# Patient Record
Sex: FEMALE | Race: WHITE | NOT HISPANIC OR LATINO | Employment: FULL TIME | URBAN - METROPOLITAN AREA
[De-identification: names, ages, dates, MRNs, and addresses within clinical notes are randomized per-mention and may not be internally consistent; named-entity substitution may affect disease eponyms.]

---

## 2017-01-07 ENCOUNTER — MYC MEDICAL ADVICE (OUTPATIENT)
Dept: PEDIATRICS | Facility: CLINIC | Age: 52
End: 2017-01-07

## 2017-01-07 DIAGNOSIS — I10 BENIGN HYPERTENSION: Primary | ICD-10-CM

## 2017-01-09 RX ORDER — HYDROCHLOROTHIAZIDE 25 MG/1
12.5 TABLET ORAL DAILY
Qty: 15 TABLET | Refills: 0 | Status: SHIPPED | OUTPATIENT
Start: 2017-01-09 | End: 2017-04-13

## 2017-01-09 NOTE — TELEPHONE ENCOUNTER
HCTZ 25 mg      Last Written Prescription Date: 06/17/15  Last Fill Quantity: 45, # refills: 3  Last Office Visit with [facility], [facility] or [facility] prescribing provider: 11/09/16  Per office appointment note of 11/09/16-will add hctz to lisinopril, recheck in 1 month.      POTASSIUM   Date Value Ref Range Status   10/26/2016 4.4 3.4 - 5.3 mmol/L Final     CREATININE   Date Value Ref Range Status   10/26/2016 0.72 0.52 - 1.04 mg/dL Final     BP Readings from Last 3 Encounters:   12/02/16 110/80   11/16/16 118/79   11/07/16 156/94   Prescription approved per [facility] Refill Protocol for 30 day supply in order to use up remaining Lisinopril 10 mg-prior to beginning combination Lisinopril/HCTZ.  SCAR Bruce RN

## 2017-04-12 DIAGNOSIS — I10 BENIGN HYPERTENSION: ICD-10-CM

## 2017-04-13 RX ORDER — LISINOPRIL/HYDROCHLOROTHIAZIDE 10-12.5 MG
TABLET ORAL
Qty: 30 TABLET | Refills: 1 | Status: SHIPPED | OUTPATIENT
Start: 2017-04-13 | End: 2017-06-16

## 2017-04-13 NOTE — TELEPHONE ENCOUNTER
LISINOPRIL-HCTZ 10/12.5MG      Last Written Prescription Date: 11/9/2016  Last Fill Quantity: 30, # refills: 1  Last Office Visit with [facility], P or [facility] prescribing provider: 11/9/2016       Potassium   Date Value Ref Range Status   10/26/2016 4.4 3.4 - 5.3 mmol/L Final     Creatinine   Date Value Ref Range Status   10/26/2016 0.72 0.52 - 1.04 mg/dL Final     BP Readings from Last 3 Encounters:   12/02/16 110/80   11/16/16 118/79   11/07/16 (!) 156/94

## 2017-04-13 NOTE — TELEPHONE ENCOUNTER
Routing refill request to provider for review/approval because:  Please clarify, has the plain HCTZ been discontinued or is she taking both?  Should it be removed from medication list?  Please sign if ok.   Susana Costello RN  Triage Nurse

## 2017-06-16 DIAGNOSIS — I10 BENIGN HYPERTENSION: ICD-10-CM

## 2017-06-16 NOTE — TELEPHONE ENCOUNTER
lisinopril-hydrochlorothiazide (PRINZIDE/ZESTORETIC) 10-12.5 MG      Last Written Prescription Date: 4/13/17  Last Fill Quantity: 30, # refills: 1  Last Office Visit with [facility], [facility] or [facility] prescribing provider: 11/9/16       Potassium   Date Value Ref Range Status   10/26/2016 4.4 3.4 - 5.3 mmol/L Final     Creatinine   Date Value Ref Range Status   10/26/2016 0.72 0.52 - 1.04 mg/dL Final     BP Readings from Last 3 Encounters:   12/02/16 110/80   11/16/16 118/79   11/07/16 (!) 156/94

## 2017-06-20 RX ORDER — LISINOPRIL/HYDROCHLOROTHIAZIDE 10-12.5 MG
TABLET ORAL
Qty: 30 TABLET | Refills: 4 | Status: SHIPPED | OUTPATIENT
Start: 2017-06-20 | End: 2017-08-06

## 2017-06-20 NOTE — TELEPHONE ENCOUNTER
Prescription approved per [facility] Refill Protocol.    Josy Duran RN

## 2017-06-24 ENCOUNTER — HEALTH MAINTENANCE LETTER (OUTPATIENT)
Age: 52
End: 2017-06-24

## 2017-07-20 ENCOUNTER — HOSPITAL ENCOUNTER (OUTPATIENT)
Dept: MAMMOGRAPHY | Facility: CLINIC | Age: 52
Discharge: HOME | End: 2017-07-20
Attending: INTERNAL MEDICINE | Admitting: INTERNAL MEDICINE
Payer: COMMERCIAL

## 2017-07-20 DIAGNOSIS — Z12.31 ENCOUNTER FOR SCREENING MAMMOGRAM FOR HIGH-RISK PATIENT: ICD-10-CM

## 2017-07-20 PROCEDURE — 77063 BREAST TOMOSYNTHESIS BI: CPT

## 2017-08-01 ENCOUNTER — OFFICE VISIT (OUTPATIENT)
Dept: PEDIATRICS | Facility: CLINIC | Age: 52
End: 2017-08-01
Payer: COMMERCIAL

## 2017-08-01 VITALS
HEIGHT: 69 IN | SYSTOLIC BLOOD PRESSURE: 126 MMHG | TEMPERATURE: 98.4 F | DIASTOLIC BLOOD PRESSURE: 78 MMHG | BODY MASS INDEX: 26.14 KG/M2 | OXYGEN SATURATION: 99 % | HEART RATE: 60 BPM | WEIGHT: 176.5 LBS

## 2017-08-01 DIAGNOSIS — Z00.00 ROUTINE GENERAL MEDICAL EXAMINATION AT A HEALTH CARE FACILITY: Primary | ICD-10-CM

## 2017-08-01 DIAGNOSIS — E87.1 HYPONATREMIA: ICD-10-CM

## 2017-08-01 DIAGNOSIS — F41.1 GENERALIZED ANXIETY DISORDER: ICD-10-CM

## 2017-08-01 DIAGNOSIS — I10 BENIGN HYPERTENSION: ICD-10-CM

## 2017-08-01 DIAGNOSIS — E55.9 VITAMIN D DEFICIENCY: ICD-10-CM

## 2017-08-01 DIAGNOSIS — D50.9 IRON DEFICIENCY ANEMIA, UNSPECIFIED IRON DEFICIENCY ANEMIA TYPE: ICD-10-CM

## 2017-08-01 DIAGNOSIS — R80.9 MICROALBUMINURIA: ICD-10-CM

## 2017-08-01 LAB — HGB BLD-MCNC: 12.3 G/DL (ref 11.7–15.7)

## 2017-08-01 PROCEDURE — 85018 HEMOGLOBIN: CPT | Performed by: INTERNAL MEDICINE

## 2017-08-01 PROCEDURE — 82306 VITAMIN D 25 HYDROXY: CPT | Performed by: INTERNAL MEDICINE

## 2017-08-01 PROCEDURE — 80048 BASIC METABOLIC PNL TOTAL CA: CPT | Performed by: INTERNAL MEDICINE

## 2017-08-01 PROCEDURE — 99212 OFFICE O/P EST SF 10 MIN: CPT | Mod: 25 | Performed by: INTERNAL MEDICINE

## 2017-08-01 PROCEDURE — 82043 UR ALBUMIN QUANTITATIVE: CPT | Performed by: INTERNAL MEDICINE

## 2017-08-01 PROCEDURE — 36415 COLL VENOUS BLD VENIPUNCTURE: CPT | Performed by: INTERNAL MEDICINE

## 2017-08-01 PROCEDURE — 99396 PREV VISIT EST AGE 40-64: CPT | Performed by: INTERNAL MEDICINE

## 2017-08-01 RX ORDER — CITALOPRAM HYDROBROMIDE 10 MG/1
10 TABLET ORAL DAILY
Qty: 90 TABLET | Refills: 3 | Status: SHIPPED | OUTPATIENT
Start: 2017-08-01 | End: 2018-09-24

## 2017-08-01 RX ORDER — LISINOPRIL/HYDROCHLOROTHIAZIDE 10-12.5 MG
1 TABLET ORAL DAILY
Qty: 30 TABLET | Refills: 4 | Status: CANCELLED | OUTPATIENT
Start: 2017-08-01

## 2017-08-01 ASSESSMENT — ANXIETY QUESTIONNAIRES
IF YOU CHECKED OFF ANY PROBLEMS ON THIS QUESTIONNAIRE, HOW DIFFICULT HAVE THESE PROBLEMS MADE IT FOR YOU TO DO YOUR WORK, TAKE CARE OF THINGS AT HOME, OR GET ALONG WITH OTHER PEOPLE: NOT DIFFICULT AT ALL
1. FEELING NERVOUS, ANXIOUS, OR ON EDGE: NOT AT ALL
6. BECOMING EASILY ANNOYED OR IRRITABLE: NOT AT ALL
3. WORRYING TOO MUCH ABOUT DIFFERENT THINGS: NOT AT ALL
2. NOT BEING ABLE TO STOP OR CONTROL WORRYING: NOT AT ALL
7. FEELING AFRAID AS IF SOMETHING AWFUL MIGHT HAPPEN: NOT AT ALL
GAD7 TOTAL SCORE: 0
5. BEING SO RESTLESS THAT IT IS HARD TO SIT STILL: NOT AT ALL

## 2017-08-01 ASSESSMENT — PATIENT HEALTH QUESTIONNAIRE - PHQ9: 5. POOR APPETITE OR OVEREATING: NOT AT ALL

## 2017-08-01 NOTE — MR AVS SNAPSHOT
After Visit Summary   8/1/2017    Josy John    MRN: [id number]           Patient Information     Date Of Birth          1965        Visit Information        Provider Department      8/1/2017 2:40 PM Samantha Mcdonald MD [facility]        Today's Diagnoses     Routine general medical examination at a health care facility    -  1    Benign hypertension        Microalbuminuria        Generalized anxiety disorder        Iron deficiency anemia, unspecified iron deficiency anemia type        Vitamin D deficiency          Care Instructions      Preventive Health Recommendations  Female Ages 50 - 64    Yearly exam: See your health care provider every year in order to  o Review health changes.   o Discuss preventive care.    o Review your medicines if your doctor has prescribed any.      Get a Pap test every three years (unless you have an abnormal result and your provider advises testing more often).    If you get Pap tests with HPV test, you only need to test every 5 years, unless you have an abnormal result.     You do not need a Pap test if your uterus was removed (hysterectomy) and you have not had cancer.    You should be tested each year for STDs (sexually transmitted diseases) if you're at risk.     Have a mammogram every 1 to 2 years.    Have a colonoscopy at age 50, or have a yearly FIT test (stool test). These exams screen for colon cancer.      Have a cholesterol test every 5 years, or more often if advised.    Have a diabetes test (fasting glucose) every three years. If you are at risk for diabetes, you should have this test more often.     If you are at risk for osteoporosis (brittle bone disease), think about having a bone density scan (DEXA).    Shots: Get a flu shot each year. Get a tetanus shot every 10 years.    Nutrition:     Eat at least 5 servings of fruits and vegetables each day.    Eat whole-grain bread, whole-wheat pasta and brown rice instead of white  "grains and rice.    Talk to your provider about Calcium and Vitamin D.     Lifestyle    Exercise at least 150 minutes a week (30 minutes a day, 5 days a week). This will help you control your weight and prevent disease.    Limit alcohol to one drink per day.    No smoking.     Wear sunscreen to prevent skin cancer.     See your dentist every six months for an exam and cleaning.    See your eye doctor every 1 to 2 years.            Follow-ups after your visit        Who to contact     If you have questions or need follow up information about today's clinic visit or your schedule please contact [facility] [place] directly at [phone].  Normal or non-critical lab and imaging results will be communicated to you by SpaceFacehart, letter or phone within 4 business days after the clinic has received the results. If you do not hear from us within 7 days, please contact the clinic through MiracleCordt or phone. If you have a critical or abnormal lab result, we will notify you by phone as soon as possible.  Submit refill requests through Forensic Logic or call your pharmacy and they will forward the refill request to us. Please allow 3 business days for your refill to be completed.          Additional Information About Your Visit        SpaceFaceharCrowdfunder Information     [employer] gives you secure access to your electronic health record. If you see a primary care provider, you can also send messages to your care team and make appointments. If you have questions, please call your primary care clinic.  If you do not have a primary care provider, please call [phone] and they will assist you.        Care EveryWhere ID     This is your Care EveryWhere ID. This could be used by other organizations to access your [facility] medical records  [id number]        Your Vitals Were     Pulse Temperature Height Pulse Oximetry BMI (Body Mass Index)       60 98.4  F (36.9  C) (Tympanic) 5' 8.75\" (1.746 m) 99% 26.25 kg/m2        Blood Pressure from Last 3 " Encounters:   08/01/17 126/78   12/02/16 110/80   11/16/16 118/79    Weight from Last 3 Encounters:   08/01/17 176 lb 8 oz (80.1 kg)   11/07/16 178 lb 8 oz (81 kg)   09/28/16 177 lb 2 oz (80.3 kg)              We Performed the Following     Albumin Random Urine Quantitative     Basic metabolic panel     HEMOGLOBIN     Vitamin D Deficiency          Where to get your medicines      These medications were sent to [facility] [address]   [address], [place] [address]     Phone:  [phone]     citalopram 10 MG tablet          Primary Care Provider Office Phone # Fax #    Pamela Clark [phone]       [facility] [address] [place] [address]        Equal Access to Services     [facility]: Hadii aad ku hadasho Soomaali, waaxda luqadaha, qaybta kaalmada adeegyada, pipo rameshin haydinorah levy . So [facility] [phone].    ATENCIÓN: Si habla español, tiene a cid disposición servicios gratuitos de asistencia lingüística. Llame al [phone].    We comply with applicable federal civil rights laws and [place] laws. We do not discriminate on the basis of race, color, national origin, age, disability sex, sexual orientation or gender identity.            Thank you!     Thank you for choosing [facility]  for your care. Our goal is always to provide you with excellent care. Hearing back from our patients is one way we can continue to improve our services. Please take a few minutes to complete the written survey that you may receive in the mail after your visit with us. Thank you!             Your Updated Medication List - Protect others around you: Learn how to safely use, store and throw away your medicines at www.disposemymeds.org.          This list is accurate as of: 8/1/17  3:23 PM.  Always use your most recent med list.                   Brand Name Dispense Instructions for use  Diagnosis    aspirin 81 MG tablet      Take by mouth daily        citalopram 10 MG tablet    celeXA    90 tablet    Take 1 tablet (10 mg) by mouth daily    Generalized anxiety disorder       lisinopril-hydrochlorothiazide 10-12.5 MG per tablet    PRINZIDE/ZESTORETIC    30 tablet    TAKE ONE TABLET BY MOUTH EVERY DAY    Benign hypertension       omega 3 1000 MG Caps     90 capsule    Take 1 g by mouth daily        VITAMIN D3 PO      Take 1,000 Units by mouth daily

## 2017-08-01 NOTE — NURSING NOTE
"Chief Complaint   Patient presents with     Physical       Initial /78 (BP Location: Right arm, Patient Position: Chair, Cuff Size: Adult Large)  Pulse 60  Temp 98.4  F (36.9  C) (Tympanic)  Ht 5' 8.75\" (1.746 m)  Wt 176 lb 8 oz (80.1 kg)  SpO2 99%  BMI 26.25 kg/m2 Estimated body mass index is 26.25 kg/(m^2) as calculated from the following:    Height as of this encounter: 5' 8.75\" (1.746 m).    Weight as of this encounter: 176 lb 8 oz (80.1 kg).  Medication Reconciliation: complete   Flower Kerr CMA    "

## 2017-08-01 NOTE — LETTER
[facility] [place]  [address]  [address]  [place] [address] [phone]        November 17, 2017    Josy John  [address] [place] [address] [place] [address]              Dear Josy John    This is to remind you that your Non-Fasting labs are due.    You may call our office at [phone] to schedule an appointment.    Please disregard this notice if you have already had your labs drawn or made an appointment.        Sincerely,        Samantha Mcdonald MD

## 2017-08-01 NOTE — PROGRESS NOTES
SUBJECTIVE:   CC: Josy John is an 52 year old woman who presents for preventive health visit.     Physical   Annual:     Getting at least 3 servings of Calcium per day::  Yes    Bi-annual eye exam::  Yes    Dental care twice a year::  Yes    Sleep apnea or symptoms of sleep apnea::  None    Diet::  Regular (no restrictions)    Frequency of exercise::  6-7 days/week    Taking medications regularly::  Yes    Medication side effects::  None    Additional concerns today::  YES (discuss MMR vaccine, discuss taking vitamin D over the summer time and  other meds)    1) unsure if still she should be taking aspirin.    2) wondering if she should take vit D in winter.    3) is not immune for mumps by titers. Planning to get vaccine from [facility].     4) htn:  Hasn't been taking home bp's  a lot. Feels well.  No chest pain or shortness of breath.   Will sometimes will get a tickle in her throat at nighttime.    5) feels that anxiety is well controlled on celexa.        Hypertension Follow-up      Outpatient blood pressures are not being checked.    Low Salt Diet: no added salt, low salt    Anxiety Follow-Up    Status since last visit: No change    Other associated symptoms:None    Complicating factors:   Significant life event: No   Current substance abuse: None  Depression symptoms: No  ALICIA-7 SCORE 4/2/2014 6/20/2016   Total Score 1 -   Total Score - 2       GAD7      Today's PHQ-2 Score:   PHQ-2 ( 1999 Pfizer) 8/1/2017   Q1: Little interest or pleasure in doing things 0   Q2: Feeling down, depressed or hopeless 0   PHQ-2 Score 0   Q1: Little interest or pleasure in doing things Not at all   Q2: Feeling down, depressed or hopeless Not at all   PHQ-2 Score 0       Abuse: Current or Past(Physical, Sexual or Emotional)- NO  Do you feel safe in your environment - YES    Social History   Substance Use Topics     Smoking status: Never Smoker     Smokeless tobacco: Never Used     Alcohol use Yes      Comment: rare     The  patient does not drink >3 drinks per day nor >7 drinks per week.    Reviewed orders with patient.  Reviewed health maintenance and updated orders accordingly - Yes    Patient Active Problem List   Diagnosis     CARDIOVASCULAR SCREENING; LDL GOAL LESS THAN 160     Seasonal allergies     Benign hypertension     Generalized anxiety disorder     Family history of hypothyroidism     Microalbuminuria     Vitamin D deficiency     Past Surgical History:   Procedure Laterality Date     C NONSPECIFIC PROCEDURE      reimplantation of ureters age 5     COLONOSCOPY N/A 11/10/2014    Procedure: COLONOSCOPY;  Surgeon: Sherrie Ghosh MD;  Location:  GI     DILATION AND CURETTAGE, ABLATE ENDOMETRIUM THERMACHOICE, COMBINED N/A 12/22/2014    Procedure: COMBINED DILATION AND CURETTAGE, ABLATE ENDOMETRIUM THERMACHOICE;  Surgeon: Sneha Wheat MD;  Location: [facility]     GI SURGERY      ureter implant as a child     HC COLP CERVIX/UPPER VAGINA  1996    after stillbirth     HEAD & NECK SURGERY      wisdom teeth removed     KERATOTOMY ARCUATE WITH FEMTOSECOND LASER/IMAGING FOR ATIOL Left 11/11/2015    Procedure: KERATOTOMY ARCUATE WITH FEMTOSECOND LASER/IMAGING FOR ATIOL;  Surgeon: Riley Salazar MD;  Location: [facility]     KERATOTOMY ARCUATE WITH FEMTOSECOND LASER/IMAGING FOR ATIOL Right 11/16/2016    Procedure: KERATOTOMY ARCUATE WITH FEMTOSECOND LASER/IMAGING FOR ATIOL;  Surgeon: Riley Salazar MD;  Location: [facility]     OPERATIVE HYSTEROSCOPY WITH MORCELLATOR N/A 12/22/2014    Procedure: OPERATIVE HYSTEROSCOPY WITH MORCELLATOR (LUNDBERG & NEPHEW);  Surgeon: Sneha Wheat MD;  Location: [facility]     PHACOEMULSIFICATION CLEAR CORNEA WITH TORIC INTRAOCULAR LENS IMPLANT Left 11/11/2015    Procedure: PHACOEMULSIFICATION CLEAR CORNEA WITH TORIC INTRAOCULAR LENS IMPLANT;  Surgeon: Riley Salazar MD;  Location:  [facility]     PHACOEMULSIFICATION CLEAR CORNEA WITH TORIC INTRAOCULAR LENS IMPLANT Right 11/16/2016    Procedure: PHACOEMULSIFICATION  CLEAR CORNEA WITH TORIC INTRAOCULAR LENS IMPLANT;  Surgeon: Riley Salazar MD;  Location: [facility]       Social History   Substance Use Topics     Smoking status: Never Smoker     Smokeless tobacco: Never Used     Alcohol use Yes      Comment: rare     Family History   Problem Relation Age of Onset     Hypertension Mother      Hypertension Father      Thyroid Disease Mother      goiter     Hypertension Brother      Thyroid Disease Father      hypothyroidism     Lipids Mother      70s     Connective Tissue Disorder Child      spondyloarthropathy         Current Outpatient Prescriptions   Medication Sig Dispense Refill     citalopram (CELEXA) 10 MG tablet Take 1 tablet (10 mg) by mouth daily 90 tablet 3     lisinopril-hydrochlorothiazide (PRINZIDE/ZESTORETIC) 10-12.5 MG per tablet TAKE ONE TABLET BY MOUTH EVERY DAY 30 tablet 4     omega 3 1000 MG CAPS Take 1 g by mouth daily 90 capsule      Cholecalciferol (VITAMIN D3 PO) Take 1,000 Units by mouth daily       [DISCONTINUED] citalopram (CELEXA) 10 MG tablet Take 1 tablet (10 mg) by mouth daily 90 tablet 3     aspirin 81 MG tablet Take by mouth daily           Patient over age 50, mutual decision to screen reflected in health maintenance.      Pertinent mammograms are reviewed under the imaging tab.  History of abnormal Pap smear: NO - age 30-65 PAP every 5 years with negative HPV co-testing recommended    Reviewed and updated as needed this visit by clinical staffTobacco  Allergies  Meds  Med Hx         Reviewed and updated as needed this visit by Provider        Past Medical History:   Diagnosis Date     Anxiety state, unspecified      Cataract 2014    Left eye     Esophageal reflux      Female stress incontinence 2004     Hypertension      Labyrinthitis, unspecified      LIPOMA SKIN NEC 11/22/2005    right axilla      Menorrhagia 3/14/2013     Other forms of migraine, without mention of intractable migraine without mention of status migrainosus 1998    global,  vomiting     Unspecified essential hypertension 2004      Past Surgical History:   Procedure Laterality Date     C NONSPECIFIC PROCEDURE      reimplantation of ureters age 5     COLONOSCOPY N/A 11/10/2014    Procedure: COLONOSCOPY;  Surgeon: Sherrie Ghosh MD;  Location:  GI     DILATION AND CURETTAGE, ABLATE ENDOMETRIUM THERMACHOICE, COMBINED N/A 12/22/2014    Procedure: COMBINED DILATION AND CURETTAGE, ABLATE ENDOMETRIUM THERMACHOICE;  Surgeon: Sneha Wheat MD;  Location: [facility]     GI SURGERY      ureter implant as a child     HC COLP CERVIX/UPPER VAGINA  1996    after stillbirth     HEAD & NECK SURGERY      wisdom teeth removed     KERATOTOMY ARCUATE WITH FEMTOSECOND LASER/IMAGING FOR ATIOL Left 11/11/2015    Procedure: KERATOTOMY ARCUATE WITH FEMTOSECOND LASER/IMAGING FOR ATIOL;  Surgeon: Riley Salazar MD;  Location: [facility]     KERATOTOMY ARCUATE WITH FEMTOSECOND LASER/IMAGING FOR ATIOL Right 11/16/2016    Procedure: KERATOTOMY ARCUATE WITH FEMTOSECOND LASER/IMAGING FOR ATIOL;  Surgeon: Riley Salazar MD;  Location: [facility]     OPERATIVE HYSTEROSCOPY WITH MORCELLATOR N/A 12/22/2014    Procedure: OPERATIVE HYSTEROSCOPY WITH MORCELLATOR (LUNDBERG & NEPHEW);  Surgeon: Sneha Wheat MD;  Location:  [facility]     PHACOEMULSIFICATION CLEAR CORNEA WITH TORIC INTRAOCULAR LENS IMPLANT Left 11/11/2015    Procedure: PHACOEMULSIFICATION CLEAR CORNEA WITH TORIC INTRAOCULAR LENS IMPLANT;  Surgeon: Riley Salazar MD;  Location: [facility]     PHACOEMULSIFICATION CLEAR CORNEA WITH TORIC INTRAOCULAR LENS IMPLANT Right 11/16/2016    Procedure: PHACOEMULSIFICATION CLEAR CORNEA WITH TORIC INTRAOCULAR LENS IMPLANT;  Surgeon: Riley Salazar MD;  Location: [facility]       ROS:  C: NEGATIVE for fever, chills, change in weight  I: NEGATIVE for worrisome rashes, moles or lesions  E: NEGATIVE for vision changes or irritation  ENT: NEGATIVE for ear, mouth and throat problems  R: NEGATIVE for significant cough or SOB  B: NEGATIVE for  "masses, tenderness or discharge  CV: NEGATIVE for chest pain, palpitations or peripheral edema  GI: NEGATIVE for nausea, abdominal pain, heartburn, or change in bowel habits  : NEGATIVE for unusual urinary or vaginal symptoms. Periods are regular.  M: NEGATIVE for significant arthralgias or myalgia  N: NEGATIVE for weakness, dizziness or paresthesias  P: NEGATIVE for changes in mood or affect     OBJECTIVE:   /78 (BP Location: Right arm, Patient Position: Chair, Cuff Size: Adult Large)  Pulse 60  Temp 98.4  F (36.9  C) (Tympanic)  Ht 5' 8.75\" (1.746 m)  Wt 176 lb 8 oz (80.1 kg)  SpO2 99%  BMI 26.25 kg/m2  EXAM:  GENERAL: healthy, alert and no distress  EYES: Eyes grossly normal to inspection, PERRL and conjunctivae and sclerae normal  HENT: ear canals and TM's normal, nose and mouth without ulcers or lesions  NECK: no adenopathy, no asymmetry, masses and thyroid normal to palpation  RESP: lungs clear to auscultation - no rales, rhonchi or wheezes  BREAST: normal without masses, tenderness or nipple discharge and no palpable axillary masses or adenopathy  CV: regular rate and rhythm, normal S1 S2, no S3 or S4, no murmur, click or rub, no peripheral edema   ABDOMEN: soft, nontender, no hepatosplenomegaly, no masses and bowel sounds normal  MS: no gross musculoskeletal defects noted, no edema  SKIN: no suspicious lesions or rashes  NEURO: Normal strength and tone, mentation intact and speech normal  PSYCH: mentation appears normal, affect normal/bright    ASSESSMENT/PLAN:   (Z00.00) Routine general medical examination at a health care facility  (primary encounter diagnosis)  -pap done at gyn, will get records  -mammo utd  -colonoscopy utd    (I10) Benign hypertension  -bp at goal, no side effects from meds or signs of end organ damage  -check labs to evaluate for renal side effects/end organ damage  Plan: Basic metabolic panel          (R80.9) Microalbuminuria  -if microalbumin is still abnormal will need " "to stop hctz and increase lisinopril dose   Plan: Albumin Random Urine Quantitative           (F41.1) Generalized anxiety disorder  -doing well, pt does not wish to do trial off of medications   Plan: citalopram (CELEXA) 10 MG tablet           (D50.9) Iron deficiency anemia, unspecified iron deficiency anemia type  -periods tapering off; will be sure no longer anemic   Plan: Hemoglobin, CANCELED: HEMOGLOBIN       (E55.9) Vitamin D deficiency  Plan: Vitamin D Deficiency              COUNSELING:  Reviewed preventive health counseling, as reflected in patient instructions       Regular exercise       Healthy diet/nutrition         reports that she has never smoked. She has never used smokeless tobacco.    Estimated body mass index is 26.25 kg/(m^2) as calculated from the following:    Height as of this encounter: 5' 8.75\" (1.746 m).    Weight as of this encounter: 176 lb 8 oz (80.1 kg).   Weight management plan: Discussed healthy diet and exercise guidelines and patient will follow up in 12 months in clinic to re-evaluate.    Counseling Resources:  ATP IV Guidelines  Pooled Cohorts Equation Calculator  Breast Cancer Risk Calculator  FRAX Risk Assessment  ICSI Preventive Guidelines  Dietary Guidelines for Americans, 2010  USDA's MyPlate  ASA Prophylaxis  Lung CA Screening    Samantha Mcdonald MD  [facility] [place]  "

## 2017-08-02 LAB
ANION GAP SERPL CALCULATED.3IONS-SCNC: 7 MMOL/L (ref 3–14)
BUN SERPL-MCNC: 11 MG/DL (ref 7–30)
CALCIUM SERPL-MCNC: 8.4 MG/DL (ref 8.5–10.1)
CHLORIDE SERPL-SCNC: 94 MMOL/L (ref 94–109)
CO2 SERPL-SCNC: 28 MMOL/L (ref 20–32)
CREAT SERPL-MCNC: 0.65 MG/DL (ref 0.52–1.04)
CREAT UR-MCNC: 39 MG/DL
DEPRECATED CALCIDIOL+CALCIFEROL SERPL-MC: 28 UG/L (ref 20–75)
GFR SERPL CREATININE-BSD FRML MDRD: ABNORMAL ML/MIN/1.7M2
GLUCOSE SERPL-MCNC: 88 MG/DL (ref 70–99)
MICROALBUMIN UR-MCNC: 17 MG/L
MICROALBUMIN/CREAT UR: 42.6 MG/G CR (ref 0–25)
POTASSIUM SERPL-SCNC: 3.9 MMOL/L (ref 3.4–5.3)
SODIUM SERPL-SCNC: 129 MMOL/L (ref 133–144)

## 2017-08-02 ASSESSMENT — PATIENT HEALTH QUESTIONNAIRE - PHQ9: SUM OF ALL RESPONSES TO PHQ QUESTIONS 1-9: 0

## 2017-08-02 ASSESSMENT — ANXIETY QUESTIONNAIRES: GAD7 TOTAL SCORE: 0

## 2017-08-06 RX ORDER — LISINOPRIL 20 MG/1
20 TABLET ORAL DAILY
Qty: 30 TABLET | Refills: 1 | Status: SHIPPED | OUTPATIENT
Start: 2017-08-06 | End: 2017-09-12

## 2017-08-09 DIAGNOSIS — Z12.4 CERVICAL CANCER SCREENING: Primary | ICD-10-CM

## 2017-08-09 NOTE — Clinical Note
Please abstract the following data from this visit with this patient into the appropriate field in Epic:  Pap smear done on this date: 8/2016 (approximately), by this group: [facility], results were NIL.

## 2017-09-11 ENCOUNTER — MYC MEDICAL ADVICE (OUTPATIENT)
Dept: PEDIATRICS | Facility: CLINIC | Age: 52
End: 2017-09-11

## 2017-09-11 DIAGNOSIS — I10 BENIGN HYPERTENSION: Primary | ICD-10-CM

## 2017-09-12 RX ORDER — LISINOPRIL 40 MG/1
40 TABLET ORAL DAILY
Qty: 30 TABLET | Refills: 3 | Status: SHIPPED | OUTPATIENT
Start: 2017-09-12 | End: 2017-10-06

## 2017-11-17 ENCOUNTER — MYC MEDICAL ADVICE (OUTPATIENT)
Dept: PEDIATRICS | Facility: CLINIC | Age: 52
End: 2017-11-17

## 2017-12-12 ENCOUNTER — TRANSFERRED RECORDS (OUTPATIENT)
Dept: HEALTH INFORMATION MANAGEMENT | Facility: CLINIC | Age: 52
End: 2017-12-12

## 2017-12-20 DIAGNOSIS — E87.1 HYPONATREMIA: ICD-10-CM

## 2017-12-20 DIAGNOSIS — I10 BENIGN HYPERTENSION: ICD-10-CM

## 2017-12-20 DIAGNOSIS — R80.9 MICROALBUMINURIA: ICD-10-CM

## 2017-12-20 PROCEDURE — 82043 UR ALBUMIN QUANTITATIVE: CPT | Performed by: INTERNAL MEDICINE

## 2017-12-20 PROCEDURE — 80048 BASIC METABOLIC PNL TOTAL CA: CPT | Performed by: INTERNAL MEDICINE

## 2017-12-20 PROCEDURE — 36415 COLL VENOUS BLD VENIPUNCTURE: CPT | Performed by: INTERNAL MEDICINE

## 2017-12-21 ENCOUNTER — MYC MEDICAL ADVICE (OUTPATIENT)
Dept: PEDIATRICS | Facility: CLINIC | Age: 52
End: 2017-12-21

## 2017-12-21 DIAGNOSIS — I10 BENIGN HYPERTENSION: Primary | ICD-10-CM

## 2017-12-21 LAB
ANION GAP SERPL CALCULATED.3IONS-SCNC: 8 MMOL/L (ref 3–14)
BUN SERPL-MCNC: 14 MG/DL (ref 7–30)
CALCIUM SERPL-MCNC: 9 MG/DL (ref 8.5–10.1)
CHLORIDE SERPL-SCNC: 94 MMOL/L (ref 94–109)
CO2 SERPL-SCNC: 27 MMOL/L (ref 20–32)
CREAT SERPL-MCNC: 0.64 MG/DL (ref 0.52–1.04)
CREAT UR-MCNC: 36 MG/DL
GFR SERPL CREATININE-BSD FRML MDRD: >90 ML/MIN/1.7M2
GLUCOSE SERPL-MCNC: 94 MG/DL (ref 70–99)
MICROALBUMIN UR-MCNC: 43 MG/L
MICROALBUMIN/CREAT UR: 120.28 MG/G CR (ref 0–25)
POTASSIUM SERPL-SCNC: 3.9 MMOL/L (ref 3.4–5.3)
SODIUM SERPL-SCNC: 129 MMOL/L (ref 133–144)

## 2017-12-21 RX ORDER — LISINOPRIL/HYDROCHLOROTHIAZIDE 10-12.5 MG
1 TABLET ORAL DAILY
Qty: 30 TABLET | Refills: 1 | Status: CANCELLED | OUTPATIENT
Start: 2017-12-21

## 2017-12-22 ENCOUNTER — OFFICE VISIT (OUTPATIENT)
Dept: PEDIATRICS | Facility: CLINIC | Age: 52
End: 2017-12-22
Payer: COMMERCIAL

## 2017-12-22 VITALS
OXYGEN SATURATION: 100 % | SYSTOLIC BLOOD PRESSURE: 100 MMHG | TEMPERATURE: 97.6 F | BODY MASS INDEX: 26.33 KG/M2 | WEIGHT: 177 LBS | RESPIRATION RATE: 16 BRPM | DIASTOLIC BLOOD PRESSURE: 70 MMHG | HEART RATE: 65 BPM

## 2017-12-22 DIAGNOSIS — I10 BENIGN HYPERTENSION: ICD-10-CM

## 2017-12-22 DIAGNOSIS — E87.1 HYPONATREMIA: ICD-10-CM

## 2017-12-22 DIAGNOSIS — R10.32 ABDOMINAL PAIN, LEFT LOWER QUADRANT: Primary | ICD-10-CM

## 2017-12-22 LAB
ALBUMIN UR-MCNC: NEGATIVE MG/DL
APPEARANCE UR: CLEAR
BASOPHILS # BLD AUTO: 0 10E9/L (ref 0–0.2)
BASOPHILS NFR BLD AUTO: 0.4 %
BILIRUB UR QL STRIP: NEGATIVE
COLOR UR AUTO: YELLOW
DIFFERENTIAL METHOD BLD: ABNORMAL
EOSINOPHIL # BLD AUTO: 0.3 10E9/L (ref 0–0.7)
EOSINOPHIL NFR BLD AUTO: 3.4 %
ERYTHROCYTE [DISTWIDTH] IN BLOOD BY AUTOMATED COUNT: 12 % (ref 10–15)
GLUCOSE UR STRIP-MCNC: NEGATIVE MG/DL
HCT VFR BLD AUTO: 33.7 % (ref 35–47)
HGB BLD-MCNC: 11.7 G/DL (ref 11.7–15.7)
HGB UR QL STRIP: ABNORMAL
KETONES UR STRIP-MCNC: 15 MG/DL
LEUKOCYTE ESTERASE UR QL STRIP: NEGATIVE
LYMPHOCYTES # BLD AUTO: 2.3 10E9/L (ref 0.8–5.3)
LYMPHOCYTES NFR BLD AUTO: 25.5 %
MCH RBC QN AUTO: 32.1 PG (ref 26.5–33)
MCHC RBC AUTO-ENTMCNC: 34.7 G/DL (ref 31.5–36.5)
MCV RBC AUTO: 92 FL (ref 78–100)
MONOCYTES # BLD AUTO: 1.1 10E9/L (ref 0–1.3)
MONOCYTES NFR BLD AUTO: 12.4 %
NEUTROPHILS # BLD AUTO: 5.3 10E9/L (ref 1.6–8.3)
NEUTROPHILS NFR BLD AUTO: 58.3 %
NITRATE UR QL: NEGATIVE
NON-SQ EPI CELLS #/AREA URNS LPF: NORMAL /LPF
PH UR STRIP: 6.5 PH (ref 5–7)
PLATELET # BLD AUTO: 327 10E9/L (ref 150–450)
RBC # BLD AUTO: 3.65 10E12/L (ref 3.8–5.2)
RBC #/AREA URNS AUTO: NORMAL /HPF
SOURCE: ABNORMAL
SP GR UR STRIP: 1.01 (ref 1–1.03)
UROBILINOGEN UR STRIP-ACNC: 0.2 EU/DL (ref 0.2–1)
WBC # BLD AUTO: 9.1 10E9/L (ref 4–11)
WBC #/AREA URNS AUTO: NORMAL /HPF

## 2017-12-22 PROCEDURE — 36415 COLL VENOUS BLD VENIPUNCTURE: CPT | Performed by: INTERNAL MEDICINE

## 2017-12-22 PROCEDURE — 81001 URINALYSIS AUTO W/SCOPE: CPT | Performed by: INTERNAL MEDICINE

## 2017-12-22 PROCEDURE — 85025 COMPLETE CBC W/AUTO DIFF WBC: CPT | Performed by: INTERNAL MEDICINE

## 2017-12-22 PROCEDURE — 99214 OFFICE O/P EST MOD 30 MIN: CPT | Performed by: INTERNAL MEDICINE

## 2017-12-22 RX ORDER — HYDROCHLOROTHIAZIDE 12.5 MG/1
12.5 TABLET ORAL DAILY
Qty: 30 TABLET | Refills: 1 | Status: SHIPPED | OUTPATIENT
Start: 2017-12-22 | End: 2018-01-17

## 2017-12-22 RX ORDER — LISINOPRIL 20 MG/1
20 TABLET ORAL DAILY
Qty: 30 TABLET | Refills: 1 | Status: SHIPPED | OUTPATIENT
Start: 2017-12-22 | End: 2018-01-17 | Stop reason: DRUGHIGH

## 2017-12-22 NOTE — MR AVS SNAPSHOT
After Visit Summary   12/22/2017    Josy John    MRN: [id number]           Patient Information     Date Of Birth          1965        Visit Information        Provider Department      12/22/2017 2:40 PM Samantha Mcdonald MD [facility]        Today's Diagnoses     Abdominal pain, left lower quadrant    -  1    Benign hypertension        Hyponatremia          Care Instructions     Go to the ER with fevers, worsening abdominal pain or other new symptoms.      Otherwise follow up with me Jan 19, with labs on Jan 17.              Follow-ups after your visit        Your next 10 appointments already scheduled     Jan 17, 2018  2:30 PM CST   LAB with EA LAB   [facility] [place] ([facility])    [address]  [address]  [place] [address] [phone]           Please do not eat 10-12 hours before your appointment if you are coming in fasting for labs on lipids, cholesterol, or glucose (sugar). This does not apply to pregnant women. Water, hot tea and black coffee (with nothing added) are okay. Do not drink other fluids, diet soda or chew gum.            Jan 19, 2018  3:20 PM CST   Office Visit with Samantha Mcodnald MD   [facility] [place] ([facility])    [address]  [address]  [place] [address] [phone]           Bring a current list of meds and any records pertaining to this visit. For Physicals, please bring immunization records and any forms needing to be filled out. Please arrive 10 minutes early to complete paperwork.              Future tests that were ordered for you today     Open Future Orders        Priority Expected Expires Ordered    Basic metabolic panel Routine  2/21/2018 12/22/2017            Who to contact     If you have questions or need follow up information about today's clinic visit or your schedule please contact [facility] directly at [phone].  Normal or  non-critical lab and imaging results will be communicated to you by MyChart, letter or phone within 4 business days after the clinic has received the results. If you do not hear from us within 7 days, please contact the clinic through Echo itt or phone. If you have a critical or abnormal lab result, we will notify you by phone as soon as possible.  Submit refill requests through Pantry or call your pharmacy and they will forward the refill request to us. Please allow 3 business days for your refill to be completed.          Additional Information About Your Visit        Pantry Information     Pantry gives you secure access to your electronic health record. If you see a primary care provider, you can also send messages to your care team and make appointments. If you have questions, please call your primary care clinic.  If you do not have a primary care provider, please call [phone] and they will assist you.        Care EveryWhere ID     This is your Care EveryWhere ID. This could be used by other organizations to access your [place] medical records  [id number]        Your Vitals Were     Pulse Temperature Respirations Pulse Oximetry BMI (Body Mass Index)       65 97.6  F (36.4  C) (Oral) 16 100% 26.33 kg/m2        Blood Pressure from Last 3 Encounters:   12/22/17 100/70   08/01/17 126/78   12/02/16 110/80    Weight from Last 3 Encounters:   12/22/17 177 lb (80.3 kg)   08/01/17 176 lb 8 oz (80.1 kg)   11/07/16 178 lb 8 oz (81 kg)              We Performed the Following     *UA reflex to Microscopic and Culture ([place] [facility] [place] [facility] (except [place] and [place])     CBC with platelets differential     Urine Microscopic          Where to get your medicines      These medications were sent to [facility] - [address]   [address]  [address], [place] [address]     Phone:  [phone]     hydrochlorothiazide 12.5 MG Tabs tablet    lisinopril 20 MG  tablet          Primary Care Provider Office Phone # Fax #    Pamela Clark [phone] [address] [place] [address]        Equal Access to Services     KELVINGRIFFIN MILADIS : Dennys joni conde jesus alberto Oseguera, waaxda luqadaha, qaybta kaalmada deepika, pipo stephen lalucilakristine lefty. So [facility] [phone].    ATENCIÓN: Si habla español, tiene a cid disposición servicios gratuitos de asistencia lingüística. Llame al [phone].    We comply with applicable federal civil rights laws and [place] laws. We do not discriminate on the basis of race, color, national origin, age, disability, sex, sexual orientation, or gender identity.            Thank you!     Thank you for choosing [facility]  for your care. Our goal is always to provide you with excellent care. Hearing back from our patients is one way we can continue to improve our services. Please take a few minutes to complete the written survey that you may receive in the mail after your visit with us. Thank you!             Your Updated Medication List - Protect others around you: Learn how to safely use, store and throw away your medicines at www.disposemymeds.org.          This list is accurate as of: 12/22/17  3:47 PM.  Always use your most recent med list.                   Brand Name Dispense Instructions for use Diagnosis    citalopram 10 MG tablet    celeXA    90 tablet    Take 1 tablet (10 mg) by mouth daily    Generalized anxiety disorder       hydrochlorothiazide 12.5 MG Tabs tablet     30 tablet    Take 1 tablet (12.5 mg) by mouth daily    Benign hypertension       lisinopril 20 MG tablet    PRINIVIL/ZESTRIL    30 tablet    Take 1 tablet (20 mg) by mouth daily    Benign hypertension       omega 3 1000 MG Caps     90 capsule    Take 1 g by mouth daily        VITAMIN D3 PO      Take 1,000 Units by mouth daily

## 2017-12-22 NOTE — NURSING NOTE
"Chief Complaint   Patient presents with     Abdominal Pain       Initial /70 (BP Location: Right arm, Patient Position: Chair, Cuff Size: Adult Regular)  Pulse 65  Temp 97.6  F (36.4  C) (Oral)  Resp 16  Wt 177 lb (80.3 kg)  SpO2 100%  BMI 26.33 kg/m2 Estimated body mass index is 26.33 kg/(m^2) as calculated from the following:    Height as of 8/1/17: 5' 8.75\" (1.746 m).    Weight as of this encounter: 177 lb (80.3 kg).  Medication Reconciliation: complete      Health Maintenance addressed:  NONE    N/a  .Pretty SÁNCHEZ MA        "

## 2017-12-22 NOTE — PROGRESS NOTES
SUBJECTIVE:   Josy John is a 52 year old female who presents to clinic today for the following health issues:    Josy presents to the clinic for the complaint of LUQ and LLQ abdominal pain. Sx started 3 days ago and is described as a dull ache. Reports she ate popcorn the night before sx onset and is concerned it is diverticulitis. Taking a deep breath aggravates sx. She has tried TUMS to no avail.  Denies fevers, chills, nausea and vomiting, urinary sx. She does not believe she aggravated the sx. BM are regular. Patient has no history of kidney stones and no blood in urine. Reports appetite is still decreased. Eating does not exacerbate pain.     Patient stopped taking 12.5 MG hydrochlorothiazide. When increasing her dose of lisinopril up to 40 MG she notes increased pressure in chest and higher blood pressures; when she backed down to 10 MG she felt better. When she restarted hydrochlorothiazide she continued on 10 MG lisinopril.  BP have been normal for < 1 month.     Reports appetite is still decreased. Eating does not exacerbate pain.     ABDOMINAL   PAIN     Onset: 3 days     Description:   Character: Dull ache, discomfort   Location: mid to left upper quadrant left lower quadrant  Radiation: None    Intensity: moderate    Progression of Symptoms:  improving    Accompanying Signs & Symptoms:  Fever/Chills?: no   Gas/Bloating: more burping  Nausea: no   Vomitting: no   Diarrhea?: no   Constipation:no   Dysuria or Hematuria: no    History:   Trauma: no   Previous similar pain: no    Previous tests done: Colonoscopy at 50    Precipitating factors:   Does the pain change with:     Food: no      BM: no     Urination: no     Alleviating factors:  Sitting, not taking a deep breath     Therapies Tried and outcome: tums, did not help     LMP:  12/4/2017             Problem list and histories reviewed & adjusted, as indicated.  Additional history: as documented    Patient Active Problem List   Diagnosis      CARDIOVASCULAR SCREENING; LDL GOAL LESS THAN 160     Seasonal allergies     Benign hypertension     Generalized anxiety disorder     Family history of hypothyroidism     Microalbuminuria     Vitamin D deficiency     Past Surgical History:   Procedure Laterality Date     C NONSPECIFIC PROCEDURE      reimplantation of ureters age 5     COLONOSCOPY N/A 11/10/2014    Procedure: COLONOSCOPY;  Surgeon: Sherrie Ghosh MD;  Location:  GI     DILATION AND CURETTAGE, ABLATE ENDOMETRIUM THERMACHOICE, COMBINED N/A 12/22/2014    Procedure: COMBINED DILATION AND CURETTAGE, ABLATE ENDOMETRIUM THERMACHOICE;  Surgeon: Sneha Wheat MD;  Location: [facility]     GI SURGERY      ureter implant as a child     HC COLP CERVIX/UPPER VAGINA  1996    after stillbirth     HEAD & NECK SURGERY      wisdom teeth removed     KERATOTOMY ARCUATE WITH FEMTOSECOND LASER/IMAGING FOR ATIOL Left 11/11/2015    Procedure: KERATOTOMY ARCUATE WITH FEMTOSECOND LASER/IMAGING FOR ATIOL;  Surgeon: Riley Salazar MD;  Location: [facility]     KERATOTOMY ARCUATE WITH FEMTOSECOND LASER/IMAGING FOR ATIOL Right 11/16/2016    Procedure: KERATOTOMY ARCUATE WITH FEMTOSECOND LASER/IMAGING FOR ATIOL;  Surgeon: Riley Salazar MD;  Location: [facility]     OPERATIVE HYSTEROSCOPY WITH MORCELLATOR N/A 12/22/2014    Procedure: OPERATIVE HYSTEROSCOPY WITH MORCELLATOR (LUNDBERG & NEPHEW);  Surgeon: Sneha Wheat MD;  Location: [facility]     PHACOEMULSIFICATION CLEAR CORNEA WITH TORIC INTRAOCULAR LENS IMPLANT Left 11/11/2015    Procedure: PHACOEMULSIFICATION CLEAR CORNEA WITH TORIC INTRAOCULAR LENS IMPLANT;  Surgeon: Riley Salazar MD;  Location: [facility]     PHACOEMULSIFICATION CLEAR CORNEA WITH TORIC INTRAOCULAR LENS IMPLANT Right 11/16/2016    Procedure: PHACOEMULSIFICATION CLEAR CORNEA WITH TORIC INTRAOCULAR LENS IMPLANT;  Surgeon: Riley Salazar MD;  Location: [facility]       Social History   Substance Use Topics     Smoking status: Never Smoker     Smokeless tobacco: Never Used      Alcohol use Yes      Comment: rare     Family History   Problem Relation Age of Onset     Hypertension Mother      Thyroid Disease Mother      goiter     Lipids Mother      70s     Hypertension Father      Thyroid Disease Father      hypothyroidism     Hypertension Brother      Connective Tissue Disorder Child      spondyloarthropathy         Current Outpatient Prescriptions   Medication Sig Dispense Refill     hydrochlorothiazide 12.5 MG TABS tablet Take 1 tablet (12.5 mg) by mouth daily 30 tablet 1     lisinopril (PRINIVIL/ZESTRIL) 20 MG tablet Take 1 tablet (20 mg) by mouth daily 30 tablet 1     citalopram (CELEXA) 10 MG tablet Take 1 tablet (10 mg) by mouth daily 90 tablet 3     Cholecalciferol (VITAMIN D3 PO) Take 1,000 Units by mouth daily       omega 3 1000 MG CAPS Take 1 g by mouth daily 90 capsule      Allergies   Allergen Reactions     Zoloft      extreme fatigue and nausea     BP Readings from Last 3 Encounters:   12/22/17 100/70   08/01/17 126/78   12/02/16 110/80    Wt Readings from Last 3 Encounters:   12/22/17 80.3 kg (177 lb)   08/01/17 80.1 kg (176 lb 8 oz)   11/07/16 81 kg (178 lb 8 oz)                  Labs reviewed in EPIC        Reviewed and updated as needed this visit by clinical staffTobacco  Allergies  Meds  Med Hx  Surg Hx  Fam Hx  Soc Hx      Reviewed and updated as needed this visit by Provider         ROS:  Constitutional, HEENT, cardiovascular, pulmonary, GI, , musculoskeletal, neuro, skin, endocrine and psych systems are negative, except as otherwise noted.    This document serves as a record of the services and decisions personally performed and made by Samantha Mcdonald MD. It was created on her behalf by Dior Willoughby, a trained medical scribe. The creation of this document is based the provider's statements to the medical scribe.    Dior Willoughby December 22, 2017 2:57 PM  OBJECTIVE:   /70 (BP Location: Right arm, Patient Position: Chair, Cuff Size: Adult  Regular)  Pulse 65  Temp 97.6  F (36.4  C) (Oral)  Resp 16  Wt 80.3 kg (177 lb)  SpO2 100%  BMI 26.33 kg/m2  Body mass index is 26.33 kg/(m^2).  GENERAL: healthy, alert and no distress  RESP: lungs clear to auscultation - no rales, rhonchi or wheezes  CV: regular rate and rhythm, normal S1 S2, no S3 or S4, no murmur, click or rub   ABDOMEN: soft, no hepatosplenomegaly, no masses and bowel sounds normal.  Tender left sided mid abdomen with no rebound or guarding.  Some tenderness LUQ and epigastric area with no rebound or guarding   BACK: no CVA tenderness, no paralumbar tenderness  PSYCH: mentation appears normal, affect normal/bright    Diagnostic Test Results:      CBC RESULTS:   Recent Labs   Lab Test  12/22/17   1521   WBC  9.1   RBC  3.65*   HGB  11.7   HCT  33.7*   MCV  92   MCH  32.1   MCHC  34.7   RDW  12.0   PLT  327     UA RESULTS:  Recent Labs   Lab Test  12/22/17   1521   COLOR  Yellow   APPEARANCE  Clear   URINEGLC  Negative   URINEBILI  Negative   URINEKETONE  15*   SG  1.015   UBLD  Small*   URINEPH  6.5   PROTEIN  Negative   UROBILINOGEN  0.2   NITRITE  Negative   LEUKEST  Negative   RBCU  O - 2   WBCU  O - 2         ASSESSMENT/PLAN:   (R10.32) Abdominal pain, left lower quadrant  (primary encounter diagnosis)  -ddx includes muscular pain, early diverticulitis or less likely kidney stone  -discussed option of CT scan today as it is holiday weekend to eval for diverticulitis  -as cbc and UA were normal patient elected to wait for CT and monitor symptoms  -to ER with worsening pain, fevers, chills or sweats  Plan: CBC with platelets differential, *UA reflex to         Microscopic and Culture ([place] and [place]         Clinics (except [place] and [place])            (I10) Benign hypertension  -sodium remains low even on low dose hctz  -discussed options with patient as bp is much better controlled on hctz than off  -pt also with microalbuminuria, so needs to stay on ace, may need to increase  dose  -discussed option of stopping hctz and adding amlodipine to ace for better bp control vs tolerating the hyponatremia and not changing her medications  -at this point pt would like to stay on ace/hctz  -she will return in 1 month with bmp before visit and may consider change in her meds at that time.      (E87.1) Hyponatremia  -discussed causes and risks of hyponatremia-pt is aware hyponatremia can worsen and can result in hospitalization or even death.   -currently stable, ok to monitor for now as above       The information in this document, created by the medical scribe for me, accurately reflects the services I personally performed and the decisions made by me. I have reviewed and approved this document for accuracy prior to leaving the patient care area.  Samantha Mcdonald MD  [facility]

## 2017-12-22 NOTE — TELEPHONE ENCOUNTER
For the past several months the doses she has been telling us and the doses we have been sending have not been the same. Contacted the pharmacy and the patient filled Lisinopril 40 MG tabs in Sept for 1 mo supply, 20 MG tabs on Nov 4 and has been taking HCTZ Oct and Nov. It doesn't look like she stopped the HCTZ that she was directed to do at the last lab draw.  Josy Duran RN

## 2017-12-22 NOTE — PATIENT INSTRUCTIONS
Go to the ER with fevers, worsening abdominal pain or other new symptoms.      Otherwise follow up with me Jan 19, with labs on Jan 17.

## 2018-01-04 ENCOUNTER — TELEPHONE (OUTPATIENT)
Dept: CARDIOLOGY | Facility: CLINIC | Age: 53
End: 2018-01-04

## 2018-01-04 NOTE — TELEPHONE ENCOUNTER
New patient on schedule for HTN issues with low NA.   Scheduled to be seen in [place]. Noted had seen Dr. Munguia in 2015, and PMD is at [facility]. Dr. Munguia has new patient opening same day in [place].  Call placed to patient questioning which location she would like to be followed at.  Await her call back.

## 2018-01-17 ENCOUNTER — OFFICE VISIT (OUTPATIENT)
Dept: CARDIOLOGY | Facility: CLINIC | Age: 53
End: 2018-01-17
Payer: COMMERCIAL

## 2018-01-17 VITALS
HEART RATE: 66 BPM | WEIGHT: 179 LBS | DIASTOLIC BLOOD PRESSURE: 87 MMHG | BODY MASS INDEX: 26.51 KG/M2 | HEIGHT: 69 IN | SYSTOLIC BLOOD PRESSURE: 133 MMHG

## 2018-01-17 DIAGNOSIS — I10 BENIGN HYPERTENSION: Primary | ICD-10-CM

## 2018-01-17 PROCEDURE — 93000 ELECTROCARDIOGRAM COMPLETE: CPT | Performed by: INTERNAL MEDICINE

## 2018-01-17 PROCEDURE — 99214 OFFICE O/P EST MOD 30 MIN: CPT | Performed by: INTERNAL MEDICINE

## 2018-01-17 RX ORDER — LISINOPRIL 10 MG/1
10 TABLET ORAL DAILY
COMMUNITY
End: 2018-03-15

## 2018-01-17 RX ORDER — AMLODIPINE BESYLATE 2.5 MG/1
2.5 TABLET ORAL DAILY
Qty: 30 TABLET | Refills: 3 | Status: SHIPPED | OUTPATIENT
Start: 2018-01-17 | End: 2018-06-18

## 2018-01-17 NOTE — LETTER
1/17/2018      Samantha Mcdonald MD  [address] [place] [address]      RE: Josy John       Dear Colleague,    I had the pleasure of seeing Josy John in the [facility].    HISTORY OF PRESENT ILLNESS:  It is a pleasure for me to see this very delightful 52-year-old lady for evaluation of hypertension.      This lady was diagnosed with hypertension several years ago.  More recently her home readings have been higher than anticipated with readings in the 140-150 systolics and the diastolics above 80.  She had seen my colleague, Dr. Munguia, in 2015 for hypertension evaluation as well.      This lady does not have exertional chest discomfort or shortness of breath or indeed any symptoms compatible with heart disease.  EKG today is normal as is her cardiac examination.      She adheres to a low salt diet.  She eats lots of leafy vegetables.  She does not smoke, abuse alcohol or drugs.  She exercises on a regular basis.  Body mass index is 26.  Family history is notable for hypertension but both parents are in their 80s.  Her father was diagnosed with hypertension at the age of 17.      Her primary physician and had increased her lisinopril to 40 mg once daily.  However, this resulted in excessive sweating with exercise.  When hydrochlorothiazide was stopped, she noticed a swelling sensation in her upper chest and abdomen.  I advised her that this is extremely unlikely to be due to fluid retention.  She does have a history of anxiety for which she takes Celexa.      Review labs do show a sodium of 129.  In 2016, sodium was 133.  Potassium is 3.9.  Creatinine is normal, but she does have albuminuria.      I also reviewed some of her clinic blood pressure readings.  She does have a single reading 156/94 in 11/2016 but the other readings have all been normal.  In fact there is a reading of 100/70 just several weeks ago.      IMPRESSION:   1.  Essential hypertension,  probably not too badly controlled at all.   2.  Albuminuria.   3.  Hyponatremia.   4.  Anxiety.      My recommendation would be exactly what Dr. Munguia recommended in 2015.  I would like to start her on amlodipine at a low dose.  I would also like to stop the hydrochlorothiazide as this may be the cause of her hyponatremia.  Her potassium may also improve with cessation of this medication.  I will have her electrolytes checked again in 2-4 weeks' time.  If sodium remains low, I do wonder if Celexa may be a cause of hyponatremia.      As for the albuminuria it may be a reflection of hypertensive nephrosclerosis.  I do wonder if she may benefit from referral to a nephrologist to evaluate for other causes of albuminuria.      I advised home monitoring once a day first thing in the morning.  She will follow up with Dr. Munguia in about a month's time.       Outpatient Encounter Prescriptions as of 1/17/2018   Medication Sig Dispense Refill     lisinopril (PRINIVIL/ZESTRIL) 10 MG tablet Take 10 mg by mouth daily       amLODIPine (NORVASC) 2.5 MG tablet Take 1 tablet (2.5 mg) by mouth daily 30 tablet 3     citalopram (CELEXA) 10 MG tablet Take 1 tablet (10 mg) by mouth daily 90 tablet 3     Cholecalciferol (VITAMIN D3 PO) Take 1,000 Units by mouth daily       omega 3 1000 MG CAPS Take 1 g by mouth daily 90 capsule      [DISCONTINUED] hydrochlorothiazide 12.5 MG TABS tablet Take 1 tablet (12.5 mg) by mouth daily 30 tablet 1     [DISCONTINUED] lisinopril (PRINIVIL/ZESTRIL) 20 MG tablet Take 1 tablet (20 mg) by mouth daily 30 tablet 1     No facility-administered encounter medications on file as of 1/17/2018.      Again, thank you for allowing me to participate in the care of your patient.      Sincerely,    DR KIANA RAHMAN MD     [facility]

## 2018-01-17 NOTE — MR AVS SNAPSHOT
After Visit Summary   1/17/2018    Josy John    MRN: [id number]           Patient Information     Date Of Birth          1965        Visit Information        Provider Department      1/17/2018 8:15 AM Ip, Wu Butterfield MD [facility]        Today's Diagnoses     Benign hypertension    -  1       Follow-ups after your visit        Additional Services     Follow-Up with Cardiologist                 Your next 10 appointments already scheduled     Jan 19, 2018  3:20 PM CST   Office Visit with Samantha Mcdonald MD   [facility] ([facility])    [address] [place] [address]  [address]  [facility] [address] [phone]           Bring a current list of meds and any records pertaining to this visit. For Physicals, please bring immunization records and any forms needing to be filled out. Please arrive 10 minutes early to complete paperwork.              Future tests that were ordered for you today     Open Future Orders        Priority Expected Expires Ordered    Basic metabolic panel Routine 2/16/2018 1/17/2019 1/17/2018    Follow-Up with Cardiologist Routine 2/16/2018 1/17/2019 1/17/2018            Who to contact     If you have questions or need follow up information about today's clinic visit or your schedule please contact [facility] directly at [phone].  Normal or non-critical lab and imaging results will be communicated to you by MyChart, letter or phone within 4 business days after the clinic has received the results. If you do not hear from us within 7 days, please contact the clinic through MyChart or phone. If you have a critical or abnormal lab result, we will notify you by phone as soon as possible.  Submit refill requests through [employer] or call your pharmacy and they will forward the refill request to us. Please allow 3 business days for your refill to be completed.        "   Additional Information About Your Visit        Runahart Information     shoply gives you secure access to your electronic health record. If you see a primary care provider, you can also send messages to your care team and make appointments. If you have questions, please call your primary care clinic.  If you do not have a primary care provider, please call [phone] and they will assist you.        Care EveryWhere ID     This is your Care EveryWhere ID. This could be used by other organizations to access your [facility] medical records  [id number]        Your Vitals Were     Pulse Height BMI (Body Mass Index)             66 1.746 m (5' 8.75\") 26.63 kg/m2          Blood Pressure from Last 3 Encounters:   01/17/18 133/87   12/22/17 100/70   08/01/17 126/78    Weight from Last 3 Encounters:   01/17/18 81.2 kg (179 lb)   12/22/17 80.3 kg (177 lb)   08/01/17 80.1 kg (176 lb 8 oz)              We Performed the Following     EKG 12-lead complete w/read - Clinics (performed today)          Today's Medication Changes          These changes are accurate as of: 1/17/18  8:49 AM.  If you have any questions, ask your nurse or doctor.               Start taking these medicines.        Dose/Directions    amLODIPine 2.5 MG tablet   Commonly known as:  NORVASC   Used for:  Benign hypertension   Started by:  Wu Tinoco MD        Dose:  2.5 mg   Take 1 tablet (2.5 mg) by mouth daily   Quantity:  30 tablet   Refills:  3         These medicines have changed or have updated prescriptions.        Dose/Directions    lisinopril 10 MG tablet   Commonly known as:  PRINIVIL/ZESTRIL   This may have changed:  Another medication with the same name was removed. Continue taking this medication, and follow the directions you see here.   Changed by:  Wu Tinoco MD        Dose:  10 mg   Take 10 mg by mouth daily   Refills:  0         Stop taking these medicines if you haven't already. Please contact your care team if you have " questions.     hydrochlorothiazide 12.5 MG Tabs tablet   Stopped by:  Wu Tinoco MD                Where to get your medicines      These medications were sent to [facility] - [address]   [address], [place] [address]     Phone:  [phone]     amLODIPine 2.5 MG tablet                Primary Care Provider Office Phone # Fax #    Samantha Mcdonald [phone]       [address] [place] [address]        Equal Access to Services     [facility]: Hadii aad ku hadasho Soomaali, waaxda luqadaha, qaybta kaalmada adeegyada, waxay idiin hayaan adeeg kharanewton levy . So [facility] [phone].    ATENCIÓN: Si habla español, tiene a cid disposición servicios gratuitos de asistencia lingüística. [facility] [phone].    We comply with applicable federal civil rights laws and [place] laws. We do not discriminate on the basis of race, color, national origin, age, disability, sex, sexual orientation, or gender identity.            Thank you!     Thank you for choosing [facility]  for your care. Our goal is always to provide you with excellent care. Hearing back from our patients is one way we can continue to improve our services. Please take a few minutes to complete the written survey that you may receive in the mail after your visit with us. Thank you!             Your Updated Medication List - Protect others around you: Learn how to safely use, store and throw away your medicines at www.disposemymeds.org.          This list is accurate as of: 1/17/18  8:49 AM.  Always use your most recent med list.                   Brand Name Dispense Instructions for use Diagnosis    amLODIPine 2.5 MG tablet    NORVASC    30 tablet    Take 1 tablet (2.5 mg) by mouth daily    Benign hypertension       citalopram 10 MG tablet    celeXA    90 tablet    Take 1 tablet (10 mg) by mouth daily     Generalized anxiety disorder       lisinopril 10 MG tablet    PRINIVIL/ZESTRIL     Take 10 mg by mouth daily        omega 3 1000 MG Caps     90 capsule    Take 1 g by mouth daily        VITAMIN D3 PO      Take 1,000 Units by mouth daily

## 2018-01-17 NOTE — PROGRESS NOTES
HPI and Plan:   See dictation    Orders Placed This Encounter   Procedures     Basic metabolic panel     Follow-Up with Cardiologist     EKG 12-lead complete w/read - Clinics (performed today)       Orders Placed This Encounter   Medications     lisinopril (PRINIVIL/ZESTRIL) 10 MG tablet     Sig: Take 10 mg by mouth daily     amLODIPine (NORVASC) 2.5 MG tablet     Sig: Take 1 tablet (2.5 mg) by mouth daily     Dispense:  30 tablet     Refill:  3       Encounter Diagnosis   Name Primary?     Benign hypertension Yes       CURRENT MEDICATIONS:  Current Outpatient Prescriptions   Medication Sig Dispense Refill     lisinopril (PRINIVIL/ZESTRIL) 10 MG tablet Take 10 mg by mouth daily       amLODIPine (NORVASC) 2.5 MG tablet Take 1 tablet (2.5 mg) by mouth daily 30 tablet 3     citalopram (CELEXA) 10 MG tablet Take 1 tablet (10 mg) by mouth daily 90 tablet 3     Cholecalciferol (VITAMIN D3 PO) Take 1,000 Units by mouth daily       omega 3 1000 MG CAPS Take 1 g by mouth daily 90 capsule        ALLERGIES     Allergies   Allergen Reactions     Zoloft      extreme fatigue and nausea       PAST MEDICAL HISTORY:  Past Medical History:   Diagnosis Date     Anxiety state, unspecified      Cataract 2014    Left eye     Esophageal reflux      Female stress incontinence 2004     Hypertension      Labyrinthitis, unspecified      LIPOMA SKIN NEC 11/22/2005    right axilla      Menorrhagia 3/14/2013     Other forms of migraine, without mention of intractable migraine without mention of status migrainosus 1998    global, vomiting     Unspecified essential hypertension 2004       PAST SURGICAL HISTORY:  Past Surgical History:   Procedure Laterality Date     C NONSPECIFIC PROCEDURE      reimplantation of ureters age 5     COLONOSCOPY N/A 11/10/2014    Procedure: COLONOSCOPY;  Surgeon: Sherrie Ghosh MD;  Location: [facility]     DILATION AND CURETTAGE, ABLATE ENDOMETRIUM THERMACHOICE, COMBINED N/A 12/22/2014    Procedure: COMBINED  DILATION AND CURETTAGE, ABLATE ENDOMETRIUM THERMACHOICE;  Surgeon: Sneha Wheat MD;  Location: [facility]     GI SURGERY      ureter implant as a child     HC COLP CERVIX/UPPER VAGINA  1996    after stillbirth     HEAD & NECK SURGERY      wisdom teeth removed     KERATOTOMY ARCUATE WITH FEMTOSECOND LASER/IMAGING FOR ATIOL Left 11/11/2015    Procedure: KERATOTOMY ARCUATE WITH FEMTOSECOND LASER/IMAGING FOR ATIOL;  Surgeon: Riley Salazra MD;  Location: [facility]     KERATOTOMY ARCUATE WITH FEMTOSECOND LASER/IMAGING FOR ATIOL Right 11/16/2016    Procedure: KERATOTOMY ARCUATE WITH FEMTOSECOND LASER/IMAGING FOR ATIOL;  Surgeon: Riley Salazar MD;  Location: [facility]     OPERATIVE HYSTEROSCOPY WITH MORCELLATOR N/A 12/22/2014    Procedure: OPERATIVE HYSTEROSCOPY WITH MORCELLATOR (LUNDBERG & NEPHEW);  Surgeon: Sneha Wheat MD;  Location: [facility]     PHACOEMULSIFICATION CLEAR CORNEA WITH TORIC INTRAOCULAR LENS IMPLANT Left 11/11/2015    Procedure: PHACOEMULSIFICATION CLEAR CORNEA WITH TORIC INTRAOCULAR LENS IMPLANT;  Surgeon: Riley Salazar MD;  Location: [facility]     PHACOEMULSIFICATION CLEAR CORNEA WITH TORIC INTRAOCULAR LENS IMPLANT Right 11/16/2016    Procedure: PHACOEMULSIFICATION CLEAR CORNEA WITH TORIC INTRAOCULAR LENS IMPLANT;  Surgeon: Riley Salazar MD;  Location: [facility]       FAMILY HISTORY:  Family History   Problem Relation Age of Onset     Hypertension Mother      Thyroid Disease Mother      goiter     Lipids Mother      70s     Hypertension Father      Thyroid Disease Father      hypothyroidism     Hypertension Brother      Connective Tissue Disorder Child      spondyloarthropathy       SOCIAL HISTORY:  Social History     Social History     Marital status:      Spouse name: Estrada     Number of children: 2     Years of education: 14     Occupational History     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 No     Caffeine Concern Yes     coffee      Special Diet Yes     Heart Healthy      Exercise Yes     walking      Social History Narrative       Review of Systems:  Skin:  Negative     Eyes:  Negative    ENT:  Negative    Respiratory:  Negative    Cardiovascular:    Positive for;palpitations  Gastroenterology: Negative    Genitourinary:  Negative    Musculoskeletal:  Negative    Neurologic:  Positive for headaches  Psychiatric:  Negative    Heme/Lymph/Imm:  Positive for allergies  Endocrine:  Negative      Physical Exam:  Vitals: /87  Pulse 66  Ht 1.746 m (5' 8.75\")  Wt 81.2 kg (179 lb)  BMI 26.63 kg/m2    Constitutional:  cooperative, alert and oriented, well developed, well nourished, in no acute distress        Skin:  warm and dry to the touch, no apparent skin lesions or masses noted          Head:  normocephalic, no masses or lesions        Eyes:  pupils equal and round, conjunctivae and lids unremarkable, sclera white, no xanthalasma, EOMS intact, no nystagmus        Lymph:No Cervical lymphadenopathy present     ENT:  no pallor or cyanosis, dentition good        Neck:  carotid pulses are full and equal bilaterally, JVP normal, no carotid bruit        Respiratory:  normal breath sounds, clear to auscultation, normal A-P diameter, normal symmetry, normal respiratory excursion, no use of accessory muscles         Cardiac: regular rhythm, normal S1/S2, no S3 or S4, apical impulse not displaced, no murmurs, gallops or rubs                                                         GI:  abdomen soft, non-tender, BS normoactive, no mass, no HSM, no bruits        Extremities and Muscular Skeletal:  no deformities, clubbing, cyanosis, erythema observed              Neurological:  no gross motor " deficits        Psych:  Alert and Oriented x 3        Recent Lab Results:  LIPID RESULTS:  Lab Results   Component Value Date    CHOL 194 06/20/2016    HDL 49 (L) 06/20/2016     (H) 06/20/2016    TRIG 116 06/20/2016    CHOLHDLRATIO 3.9 03/14/2013       LIVER ENZYME RESULTS:  Lab Results   Component Value Date    AST 22 06/20/2016    ALT 24 06/20/2016       CBC RESULTS:  Lab Results   Component Value Date    WBC 9.1 12/22/2017    RBC 3.65 (L) 12/22/2017    HGB 11.7 12/22/2017    HCT 33.7 (L) 12/22/2017    MCV 92 12/22/2017    MCH 32.1 12/22/2017    MCHC 34.7 12/22/2017    RDW 12.0 12/22/2017     12/22/2017       BMP RESULTS:  Lab Results   Component Value Date     (L) 12/20/2017    POTASSIUM 3.9 12/20/2017    CHLORIDE 94 12/20/2017    CO2 27 12/20/2017    ANIONGAP 8 12/20/2017    GLC 94 12/20/2017    BUN 14 12/20/2017    CR 0.64 12/20/2017    GFRESTIMATED >90 12/20/2017    GFRESTBLACK >90 12/20/2017    KRIS 9.0 12/20/2017        A1C RESULTS:  No results found for: A1C    INR RESULTS:  No results found for: INR        CC  No referring provider defined for this encounter.

## 2018-01-17 NOTE — PROGRESS NOTES
HISTORY OF PRESENT ILLNESS:  It is a pleasure for me to see this very delightful 52-year-old lady for evaluation of hypertension.      This lady was diagnosed with hypertension several years ago.  More recently her home readings have been higher than anticipated with readings in the 140-150 systolics and the diastolics above 80.  She had seen my colleague, Dr. Munguia, in 2015 for hypertension evaluation as well.      This lady does not have exertional chest discomfort or shortness of breath or indeed any symptoms compatible with heart disease.  EKG today is normal as is her cardiac examination.      She adheres to a low salt diet.  She eats lots of leafy vegetables.  She does not smoke, abuse alcohol or drugs.  She exercises on a regular basis.  Body mass index is 26.  Family history is notable for hypertension but both parents are in their 80s.  Her father was diagnosed with hypertension at the age of 17.      Her primary physician and had increased her lisinopril to 40 mg once daily.  However, this resulted in excessive sweating with exercise.  When hydrochlorothiazide was stopped, she noticed a swelling sensation in her upper chest and abdomen.  I advised her that this is extremely unlikely to be due to fluid retention.  She does have a history of anxiety for which she takes Celexa.      Review labs do show a sodium of 129.  In 2016, sodium was 133.  Potassium is 3.9.  Creatinine is normal, but she does have albuminuria.      I also reviewed some of her clinic blood pressure readings.  She does have a single reading 156/94 in 11/2016 but the other readings have all been normal.  In fact there is a reading of 100/70 just several weeks ago.      IMPRESSION:   1.  Essential hypertension, probably not too badly controlled at all.   2.  Albuminuria.   3.  Hyponatremia.   4.  Anxiety.      My recommendation would be exactly what Dr. Munguia recommended in 2015.  I would like to start her on amlodipine at a low dose.  I  would also like to stop the hydrochlorothiazide as this may be the cause of her hyponatremia.  Her potassium may also improve with cessation of this medication.  I will have her electrolytes checked again in 2-4 weeks' time.  If sodium remains low, I do wonder if Celexa may be a cause of hyponatremia.      As for the albuminuria it may be a reflection of hypertensive nephrosclerosis.  I do wonder if she may benefit from referral to a nephrologist to evaluate for other causes of albuminuria.      I advised home monitoring once a day first thing in the morning.  She will follow up with Dr. Munguia in about a month's time.         KIANA RAHMAN MD, [facility]             D: 2018 08:58   T: 2018 10:42   MT: TANVI      Name:     DEIRDRE DIAS   MRN:      [id number]        Account:      [id number]   :      1965           Service Date: 2018      Document: [id number]

## 2018-01-30 ENCOUNTER — MYC MEDICAL ADVICE (OUTPATIENT)
Dept: PEDIATRICS | Facility: CLINIC | Age: 53
End: 2018-01-30

## 2018-01-30 DIAGNOSIS — J11.1 INFLUENZA-LIKE ILLNESS: Primary | ICD-10-CM

## 2018-01-31 RX ORDER — OSELTAMIVIR PHOSPHATE 75 MG/1
75 CAPSULE ORAL 2 TIMES DAILY
Qty: 10 CAPSULE | Refills: 0 | Status: SHIPPED | OUTPATIENT
Start: 2018-01-31 | End: 2018-07-16

## 2018-01-31 NOTE — TELEPHONE ENCOUNTER
Patient has no medical conditions that put her at an increased risk.  No steroids or immunotherapy.  SCAR Bruce RN    Discussed with Dr. Mcdonald and verbal order given to RX Tamiflu to treat influenza.  Order placed per her direction.  Message sent to patient.  SCAR Bruce RN

## 2018-02-27 DIAGNOSIS — I10 BENIGN HYPERTENSION: ICD-10-CM

## 2018-02-27 PROCEDURE — 80048 BASIC METABOLIC PNL TOTAL CA: CPT | Performed by: INTERNAL MEDICINE

## 2018-02-27 PROCEDURE — 36415 COLL VENOUS BLD VENIPUNCTURE: CPT | Performed by: INTERNAL MEDICINE

## 2018-02-28 LAB
ANION GAP SERPL CALCULATED.3IONS-SCNC: 6 MMOL/L (ref 3–14)
BUN SERPL-MCNC: 13 MG/DL (ref 7–30)
CALCIUM SERPL-MCNC: 8.4 MG/DL (ref 8.5–10.1)
CHLORIDE SERPL-SCNC: 101 MMOL/L (ref 94–109)
CO2 SERPL-SCNC: 27 MMOL/L (ref 20–32)
CREAT SERPL-MCNC: 0.64 MG/DL (ref 0.52–1.04)
GFR SERPL CREATININE-BSD FRML MDRD: >90 ML/MIN/1.7M2
GLUCOSE SERPL-MCNC: 87 MG/DL (ref 70–99)
POTASSIUM SERPL-SCNC: 3.9 MMOL/L (ref 3.4–5.3)
SODIUM SERPL-SCNC: 134 MMOL/L (ref 133–144)

## 2018-03-15 ENCOUNTER — MYC MEDICAL ADVICE (OUTPATIENT)
Dept: PEDIATRICS | Facility: CLINIC | Age: 53
End: 2018-03-15

## 2018-03-15 DIAGNOSIS — I10 BENIGN HYPERTENSION: Primary | ICD-10-CM

## 2018-03-15 RX ORDER — LISINOPRIL 10 MG/1
10 TABLET ORAL DAILY
Qty: 90 TABLET | Refills: 3 | Status: SHIPPED | OUTPATIENT
Start: 2018-03-15 | End: 2018-11-27

## 2018-06-18 DIAGNOSIS — I10 BENIGN HYPERTENSION: ICD-10-CM

## 2018-06-18 RX ORDER — AMLODIPINE BESYLATE 2.5 MG/1
2.5 TABLET ORAL DAILY
Qty: 30 TABLET | Refills: 0 | Status: SHIPPED | OUTPATIENT
Start: 2018-06-18 | End: 2018-07-24

## 2018-07-13 NOTE — PROGRESS NOTES
[place] - Rheumatology Clinic Visit     Josy John MRN# [id number]   YOB: 1965    Primary care provider: Samantha Mcdonald  Jul 16, 2018          Assessment and Plan:   # Left sternoclavicular arthritis- onset around 2016  # Eczema  # Family history of collagenous colitis (older daughter) and juvenile spondyloarthropathy (daugher is on enbrel therapy)    We discussed that this is likely a variant of spondyloarthropathy given the family history and also axial involvement. We will do the following work up and meet again. Options for treatment includes periodic local cortisone injection and also systemic NSAID therapy. Patient concerned about daily NSAID use. We will also get the records from [facility] and meet again to discuss next steps. Patient to contact us if she gets flare ups of joint pains.     The labs from patient records are reviewed.     I will be back in touch with the patient through mychart/letter when results are available.     Patient agrees with the above mentioned treatment plan.     Most Recent Immunizations   Administered Date(s) Administered     Influenza (IIV3) PF 11/01/2011     Influenza Vaccine IM 3yrs+ 4 Valent IIV4 10/15/2017     TDAP Vaccine (Adacel) 07/23/2009       Orders Placed This Encounter   Procedures     CBC with platelets differential     Erythrocyte sedimentation rate auto     CRP inflammation     Rheumatoid factor     Cyclic Citrullinated Peptide Antibody IgG     HLA-B27 Typing       Return in about 6 weeks (around 8/27/2018).    Medications Discontinued During This Encounter   Medication Reason     oseltamivir (TAMIFLU) 75 MG capsule      Current Outpatient Prescriptions   Medication Sig Dispense Refill     amLODIPine (NORVASC) 2.5 MG tablet Take 1 tablet (2.5 mg) by mouth daily 30 tablet 0     citalopram (CELEXA) 10 MG tablet Take 1 tablet (10 mg) by mouth daily 90 tablet 3     lisinopril (PRINIVIL/ZESTRIL) 10 MG tablet Take 1 tablet (10 mg) by  mouth daily 90 tablet 3     omega 3 1000 MG CAPS Take 1 g by mouth daily 90 capsule      Cholecalciferol (VITAMIN D3 PO) Take 1,000 Units by mouth daily         Dave Perdue MD  [facility]          Active Problem List:     Patient Active Problem List    Diagnosis Date Noted     Benign hypertension 03/14/2013     Priority: High     Vitamin D deficiency 06/24/2015     Priority: Medium     Microalbuminuria 03/17/2013     Priority: Medium     Generalized anxiety disorder 03/14/2013     Priority: Medium     Diagnosis updated by automated process. Provider to review and confirm.       Family history of hypothyroidism 03/14/2013     Priority: Low     Seasonal allergies 12/13/2011     Priority: Low     CARDIOVASCULAR SCREENING; LDL GOAL LESS THAN 160 02/10/2010     Priority: Low            History of Present Illness:     Chief Complaint   Patient presents with     Establish Care       July 13, 2018  Have you ever seen a rheumatologist No Who NA When NA  Joint pain history  Onset: pt is here for a consult. Pt had a MRI of the sternum, clavicle was told by orthopedic that she has arthritis hurts when she has a flare, or when she pushes on the area,she states that it feels like something catches  also has a patch on left side and right hand of dry rash. Has not had any labs drawn   Involved joints: see above   Pain scale:  1/10     Wakes the patient from sleep : No/ it does when she has a flare  Morning stiffness:Yes for 5 minutes  Meds used:none     Interim history  Since last visit:  1. Infections - No  2. New symptoms/medical problem - No  3. Any side effects from Rheum medications -NA  3. ER visits/Hospitalizations/surgeries - No  4. Last PCP visit: 12/22/17    Wt Readings from Last 4 Encounters:   07/16/18 77.9 kg (171 lb 11.2 oz)   01/17/18 81.2 kg (179 lb)   12/22/17 80.3 kg (177 lb)   08/01/17 80.1 kg (176 lb 8 oz)     Left flank slightly scaly rash X < 6 months  Rash in right hand with skin  peeling X 6 months  Small rash in left armpit X < 6 months    No h/o myalgia, unintentional weight loss, fevers, swollen glands  No family or personal history of psoriasis, ulcerative colitis or chron's disease. No h/o iritis.   Patient denies any raynauds  No h/o persistent shortness of breath, cough, chest pain  No h/o persistent vomiting, diarrhea, abdominal pain  No h/o hematochezia, hematuria, hemoptysis  No h/o seizures   No h/o sicca symptoms    BP Readings from Last 3 Encounters:   07/16/18 130/88   01/17/18 133/87   12/22/17 100/70              Review of Systems:   Complete ROS negative except for symptoms mentioned in the HPI          Past Medical History:     Past Medical History:   Diagnosis Date     Anxiety state, unspecified      Cataract 2014    Left eye     Esophageal reflux      Female stress incontinence 2004     Hypertension      Labyrinthitis, unspecified      LIPOMA SKIN NEC 11/22/2005    right axilla      Menorrhagia 3/14/2013     Other forms of migraine, without mention of intractable migraine without mention of status migrainosus 1998    global, vomiting     Unspecified essential hypertension 2004     Past Surgical History:   Procedure Laterality Date     C NONSPECIFIC PROCEDURE      reimplantation of ureters age 5     COLONOSCOPY N/A 11/10/2014    Procedure: COLONOSCOPY;  Surgeon: Sherrie Ghosh MD;  Location:  GI     DILATION AND CURETTAGE, ABLATE ENDOMETRIUM THERMACHOICE, COMBINED N/A 12/22/2014    Procedure: COMBINED DILATION AND CURETTAGE, ABLATE ENDOMETRIUM THERMACHOICE;  Surgeon: Sneha Wheat MD;  Location: [facility]     GI SURGERY      ureter implant as a child     HC COLP CERVIX/UPPER VAGINA  1996    after stillbirth     HEAD & NECK SURGERY      wisdom teeth removed     KERATOTOMY ARCUATE WITH FEMTOSECOND LASER/IMAGING FOR ATIOL Left 11/11/2015    Procedure: KERATOTOMY ARCUATE WITH FEMTOSECOND LASER/IMAGING FOR ATIOL;  Surgeon: Riley Salazar MD;  Location: [facility]      KERATOTOMY ARCUATE WITH FEMTOSECOND LASER/IMAGING FOR ATIOL Right 11/16/2016    Procedure: KERATOTOMY ARCUATE WITH FEMTOSECOND LASER/IMAGING FOR ATIOL;  Surgeon: Riley Salazar MD;  Location: [facility]     OPERATIVE HYSTEROSCOPY WITH MORCELLATOR N/A 12/22/2014    Procedure: OPERATIVE HYSTEROSCOPY WITH MORCELLATOR (LUNDBERG & NEPHEW);  Surgeon: Sneha Wheat MD;  Location:  [facility]     PHACOEMULSIFICATION CLEAR CORNEA WITH TORIC INTRAOCULAR LENS IMPLANT Left 11/11/2015    Procedure: PHACOEMULSIFICATION CLEAR CORNEA WITH TORIC INTRAOCULAR LENS IMPLANT;  Surgeon: Riley Salazar MD;  Location:  [facility]     PHACOEMULSIFICATION CLEAR CORNEA WITH TORIC INTRAOCULAR LENS IMPLANT Right 11/16/2016    Procedure: PHACOEMULSIFICATION CLEAR CORNEA WITH TORIC INTRAOCULAR LENS IMPLANT;  Surgeon: Riley Salazar MD;  Location: [facility]            Social History:     Social History     Occupational History     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[address]     Social History Main Topics     Smoking status: Never Smoker     Smokeless tobacco: Never Used     Alcohol use Yes      Comment: rare     Drug use: No     Sexual activity: Yes     Partners: Male      Comment: vasectomy            Family History:     Family History   Problem Relation Age of Onset     Hypertension Mother      Thyroid Disease Mother      goiter     Lipids Mother      70s     Hypertension Father      Thyroid Disease Father      hypothyroidism     Hypertension Brother      Connective Tissue Disorder Child      spondyloarthropathy            Allergies:     Allergies   Allergen Reactions     Zoloft      extreme fatigue and nausea            Medications:     Current Outpatient Prescriptions   Medication Sig Dispense Refill     amLODIPine (NORVASC) 2.5 MG tablet Take 1 tablet (2.5 mg) by mouth daily 30 tablet 0     citalopram (CELEXA) 10 MG tablet Take 1 tablet (10 mg) by mouth daily 90 tablet 3     lisinopril (PRINIVIL/ZESTRIL) 10 MG tablet Take 1 tablet (10  "mg) by mouth daily 90 tablet 3     omega 3 1000 MG CAPS Take 1 g by mouth daily 90 capsule      Cholecalciferol (VITAMIN D3 PO) Take 1,000 Units by mouth daily              Physical Exam:   Blood pressure 130/88, pulse 55, temperature 97.6  F (36.4  C), temperature source Oral, height 1.753 m (5' 9\"), weight 77.9 kg (171 lb 11.2 oz), SpO2 99 %.  Wt Readings from Last 4 Encounters:   07/16/18 77.9 kg (171 lb 11.2 oz)   01/17/18 81.2 kg (179 lb)   12/22/17 80.3 kg (177 lb)   08/01/17 80.1 kg (176 lb 8 oz)       Constitutional: well-developed, appearing stated age; cooperative  Eyes: normal conjunctiva, sclera  ENT: nl external ears, nose, lips.No mucous membrane lesions, normal saliva pool  Neck: no cervical lymphadenopathy  Resp: lungs clear to auscultation in the bases,   CV: RRR, no added sounds  GI: Abdomen soft and no tenderness  : not tested  Lymph: no cervical, supraclavicular or epitrochlear nodes  Left sternoclavicular joint is warm and swollen.   MS: All shoulder, elbow, wrist, MCP/PIP/DIP, hip, knee, ankle, and foot MTP/IP joints were examined and  found without active synovitis or major deformity. Full ROM.  No dactylitis,  tenosynovitis, enthesopathy.  Skin: no rash in exposed areas  Psych: nl judgement, orientation, memory, affect.         Data:         Dave Perdue MD    [facility]    "

## 2018-07-16 ENCOUNTER — RESULTS ONLY (OUTPATIENT)
Dept: OTHER | Facility: CLINIC | Age: 53
End: 2018-07-16

## 2018-07-16 ENCOUNTER — OFFICE VISIT (OUTPATIENT)
Dept: RHEUMATOLOGY | Facility: CLINIC | Age: 53
End: 2018-07-16
Payer: COMMERCIAL

## 2018-07-16 VITALS
HEART RATE: 55 BPM | DIASTOLIC BLOOD PRESSURE: 88 MMHG | WEIGHT: 171.7 LBS | TEMPERATURE: 97.6 F | OXYGEN SATURATION: 99 % | HEIGHT: 69 IN | BODY MASS INDEX: 25.43 KG/M2 | SYSTOLIC BLOOD PRESSURE: 130 MMHG

## 2018-07-16 DIAGNOSIS — M25.512: Primary | ICD-10-CM

## 2018-07-16 LAB
BASOPHILS # BLD AUTO: 0 10E9/L (ref 0–0.2)
BASOPHILS NFR BLD AUTO: 0.5 %
DIFFERENTIAL METHOD BLD: NORMAL
EOSINOPHIL # BLD AUTO: 0.4 10E9/L (ref 0–0.7)
EOSINOPHIL NFR BLD AUTO: 5 %
ERYTHROCYTE [DISTWIDTH] IN BLOOD BY AUTOMATED COUNT: 11.8 % (ref 10–15)
ERYTHROCYTE [SEDIMENTATION RATE] IN BLOOD BY WESTERGREN METHOD: 22 MM/H (ref 0–30)
HCT VFR BLD AUTO: 38.4 % (ref 35–47)
HGB BLD-MCNC: 13 G/DL (ref 11.7–15.7)
LYMPHOCYTES # BLD AUTO: 2.6 10E9/L (ref 0.8–5.3)
LYMPHOCYTES NFR BLD AUTO: 35.4 %
MCH RBC QN AUTO: 31.9 PG (ref 26.5–33)
MCHC RBC AUTO-ENTMCNC: 33.9 G/DL (ref 31.5–36.5)
MCV RBC AUTO: 94 FL (ref 78–100)
MONOCYTES # BLD AUTO: 0.9 10E9/L (ref 0–1.3)
MONOCYTES NFR BLD AUTO: 12.2 %
NEUTROPHILS # BLD AUTO: 3.5 10E9/L (ref 1.6–8.3)
NEUTROPHILS NFR BLD AUTO: 46.9 %
PLATELET # BLD AUTO: 307 10E9/L (ref 150–450)
RBC # BLD AUTO: 4.08 10E12/L (ref 3.8–5.2)
WBC # BLD AUTO: 7.4 10E9/L (ref 4–11)

## 2018-07-16 PROCEDURE — 81374 HLA I TYPING 1 ANTIGEN LR: CPT | Performed by: INTERNAL MEDICINE

## 2018-07-16 PROCEDURE — 86431 RHEUMATOID FACTOR QUANT: CPT | Performed by: INTERNAL MEDICINE

## 2018-07-16 PROCEDURE — 85652 RBC SED RATE AUTOMATED: CPT | Performed by: INTERNAL MEDICINE

## 2018-07-16 PROCEDURE — 99204 OFFICE O/P NEW MOD 45 MIN: CPT | Performed by: INTERNAL MEDICINE

## 2018-07-16 PROCEDURE — 85025 COMPLETE CBC W/AUTO DIFF WBC: CPT | Performed by: INTERNAL MEDICINE

## 2018-07-16 PROCEDURE — 86200 CCP ANTIBODY: CPT | Performed by: INTERNAL MEDICINE

## 2018-07-16 PROCEDURE — 36415 COLL VENOUS BLD VENIPUNCTURE: CPT | Performed by: INTERNAL MEDICINE

## 2018-07-16 PROCEDURE — 86140 C-REACTIVE PROTEIN: CPT | Performed by: INTERNAL MEDICINE

## 2018-07-16 NOTE — MR AVS SNAPSHOT
"              After Visit Summary   7/16/2018    Josy John    MRN: [id number]           Patient Information     Date Of Birth          1965        Visit Information        Provider Department      7/16/2018 2:30 PM Dave Perdue MD [facility]        Today's Diagnoses     Pain in sternoclavicular joint, left    -  1       Follow-ups after your visit        Follow-up notes from your care team     Return in about 6 weeks (around 8/27/2018).      Who to contact     If you have questions or need follow up information about today's clinic visit or your schedule please contact [facility] directly at [phone].  Normal or non-critical lab and imaging results will be communicated to you by MyChart, letter or phone within 4 business days after the clinic has received the results. If you do not hear from us within 7 days, please contact the clinic through [employer] or phone. If you have a critical or abnormal lab result, we will notify you by phone as soon as possible.  Submit refill requests through [employer] or call your pharmacy and they will forward the refill request to us. Please allow 3 business days for your refill to be completed.          Additional Information About Your Visit        MyChart Information     [employer] gives you secure access to your electronic health record. If you see a primary care provider, you can also send messages to your care team and make appointments. If you have questions, please call your primary care clinic.  If you do not have a primary care provider, please call [phone] and they will assist you.        Care EveryWhere ID     This is your Care EveryWhere ID. This could be used by other organizations to access your [facility] medical records  [id number]        Your Vitals Were     Pulse Temperature Height Pulse Oximetry BMI (Body Mass Index)       55 97.6  F (36.4  C) (Oral) 1.753 m (5' 9\") 99% 25.36 kg/m2        Blood Pressure from Last 3 " Encounters:   07/16/18 130/88   01/17/18 133/87   12/22/17 100/70    Weight from Last 3 Encounters:   07/16/18 77.9 kg (171 lb 11.2 oz)   01/17/18 81.2 kg (179 lb)   12/22/17 80.3 kg (177 lb)              We Performed the Following     CBC with platelets differential     CRP inflammation     Cyclic Citrullinated Peptide Antibody IgG     Erythrocyte sedimentation rate auto     HLA-B27 Typing     Rheumatoid factor          Today's Medication Changes          These changes are accurate as of 7/16/18  3:40 PM.  If you have any questions, ask your nurse or doctor.               Stop taking these medicines if you haven't already. Please contact your care team if you have questions.     oseltamivir 75 MG capsule   Commonly known as:  TAMIFLU   Stopped by:  Dave Perdue MD                    Primary Care Provider Office Phone # Fax #    Samantha Mcdonald [phone] [address] [place] [address]        Equal Access to Services     [facility]: Hadii aad ku hadasho Soomaali, waaxda luqadaha, qaybta kaalmada adeegyada, waxay gadielin haydinorah levy . So [facility] [phone].    ATENCIÓN: Si habla español, tiene a cid disposición servicios gratuitos de asistencia lingüística. [facility] [phone].    We comply with applicable federal civil rights laws and [place] laws. We do not discriminate on the basis of race, color, national origin, age, disability, sex, sexual orientation, or gender identity.            Thank you!     Thank you for choosing [facility]  for your care. Our goal is always to provide you with excellent care. Hearing back from our patients is one way we can continue to improve our services. Please take a few minutes to complete the written survey that you may receive in the mail after your visit with us. Thank you!             Your Updated Medication List - Protect others around you: Learn how to safely use, store and throw away  your medicines at www.disposemymeds.org.          This list is accurate as of 7/16/18  3:40 PM.  Always use your most recent med list.                   Brand Name Dispense Instructions for use Diagnosis    amLODIPine 2.5 MG tablet    NORVASC    30 tablet    Take 1 tablet (2.5 mg) by mouth daily    Benign hypertension       citalopram 10 MG tablet    celeXA    90 tablet    Take 1 tablet (10 mg) by mouth daily    Generalized anxiety disorder       lisinopril 10 MG tablet    PRINIVIL/ZESTRIL    90 tablet    Take 1 tablet (10 mg) by mouth daily    Benign hypertension       omega 3 1000 MG Caps     90 capsule    Take 1 g by mouth daily        VITAMIN D3 PO      Take 1,000 Units by mouth daily

## 2018-07-16 NOTE — NURSING NOTE
"Chief Complaint   Patient presents with     [facility] Care       Initial /88 (BP Location: Right arm, Patient Position: Chair, Cuff Size: Adult Regular)  Pulse 55  Temp 97.6  F (36.4  C) (Oral)  Ht 1.753 m (5' 9\")  Wt 77.9 kg (171 lb 11.2 oz)  SpO2 99%  BMI 25.36 kg/m2 Estimated body mass index is 25.36 kg/(m^2) as calculated from the following:    Height as of this encounter: 1.753 m (5' 9\").    Weight as of this encounter: 77.9 kg (171 lb 11.2 oz).  Medication Reconciliation: complete    Have you ever seen a rheumatologist No Who NA When NA  Joint pain history  Onset: pt is here for a consult. Pt had a MRI of the sternum, clavicle was told by orthopedic that she has arthritis hurts when she has a flare, or when she pushes on the area,she states that it feels like something catches  also has a patch on left side and right hand of dry rash. Has not had any labs drawn   Involved joints: see above   Pain scale:  1/10     Wakes the patient from sleep : No/ it does when she has a flare  Morning stiffness:Yes for 5 minutes  Meds used:none    Interim history  Since last visit:  1. Infections - No  2. New symptoms/medical problem - No  3. Any side effects from Rheum medications -NA  3. ER visits/Hospitalizations/surgeries - No  4. Last PCP visit: 12/22/17  Wt Readings from Last 4 Encounters:   07/16/18 77.9 kg (171 lb 11.2 oz)   01/17/18 81.2 kg (179 lb)   12/22/17 80.3 kg (177 lb)   08/01/17 80.1 kg (176 lb 8 oz)     BP Readings from Last 3 Encounters:   07/16/18 130/88   01/17/18 133/87   12/22/17 100/70       "

## 2018-07-16 NOTE — LETTER
[facility]  [address]  [place] [address]  [phone]      July 23, 2018    Josy John                                                                                                                                                       [address] [place] [address] [address]  [place] [address]              Dear Josy,    Your HLA B27 gene test is negative (normal). This gene is associated with certain types of autoimmune arthritis.     Sincerely,      Dave Perdue MD/.Pretty SÁNCHEZ MA    Rheumatology

## 2018-07-17 LAB
CRP SERPL-MCNC: <2.9 MG/L (ref 0–8)
HLA-B27 QL NAA+PROBE: NORMAL

## 2018-07-18 LAB
B LOCUS: NORMAL
B27TEST METHOD: NORMAL
CCP AB SER IA-ACNC: 2 U/ML
RHEUMATOID FACT SER NEPH-ACNC: <20 IU/ML (ref 0–20)

## 2018-07-18 NOTE — PROGRESS NOTES
Results released to [facility]:  Inflammatory markers are normal.   Rheumatoid antibodies are normal.   Blood cell counts are normal.   HLA B27 gene testing result pending.       Sincerely    Dave Perdue MD  [facility]

## 2018-07-20 NOTE — PROGRESS NOTES
Please send a letter to the patient with a copy of the lab results and also the following note:  Your HLA B27 gene test is negative (normal). This gene is associated with certain types of autoimmune arthritis.

## 2018-07-24 DIAGNOSIS — I10 BENIGN HYPERTENSION: ICD-10-CM

## 2018-07-24 RX ORDER — AMLODIPINE BESYLATE 2.5 MG/1
2.5 TABLET ORAL DAILY
Qty: 90 TABLET | Refills: 0 | Status: SHIPPED | OUTPATIENT
Start: 2018-07-24 | End: 2018-11-01

## 2018-08-07 ENCOUNTER — HOSPITAL ENCOUNTER (OUTPATIENT)
Dept: MAMMOGRAPHY | Facility: CLINIC | Age: 53
Discharge: HOME | End: 2018-08-07
Attending: INTERNAL MEDICINE | Admitting: INTERNAL MEDICINE
Payer: COMMERCIAL

## 2018-08-07 DIAGNOSIS — Z12.31 VISIT FOR SCREENING MAMMOGRAM: ICD-10-CM

## 2018-08-07 PROCEDURE — 77063 BREAST TOMOSYNTHESIS BI: CPT

## 2018-08-24 NOTE — PROGRESS NOTES
[facility] - [facility]umatology Clinic Visit     Josy John MRN# [id number]   YOB: 1965    Primary care provider: Samantha Mcdonald  Aug 31, 2018          Assessment and Plan:   # Left sternoclavicular arthritis- onset around 2016  # Eczema  # Family history of collagenous colitis (older daughter) and juvenile spondyloarthropathy (daugher is on enbrel therapy)    Inflammatory markers are normal.   Rheumatoid antibodies are normal.   Blood cell counts are normal.   HLA B27 negative.     We discussed that this is likely a variant of spondyloarthropathy given the family history and also axial involvement vs osteoarthritis. Last visit we discussed options for treatment includes periodic local cortisone injection and also systemic NSAID therapy. Patient was concerned about daily NSAID use. We have not received the records from [facility] (MRI done 2017) yet. Patient's main concern is whether this is bone cancer. We will review the MRI first and consider repeating the MRI if needed.   Try voltaren gel PRN for pain. Discussed side effects.     Weight loss is intentional. She is doing weight watchers.     The labs from patient records are reviewed.     F/u PRN    I will be back in touch with the patient through mychart/letter when results are available.     Patient agrees with the above mentioned treatment plan.     Most Recent Immunizations   Administered Date(s) Administered     Influenza (IIV3) PF 11/01/2011     Influenza Vaccine IM 3yrs+ 4 Valent IIV4 10/15/2017     TDAP Vaccine (Adacel) 07/23/2009       No orders of the defined types were placed in this encounter.      Data Unavailable    Medications Discontinued During This Encounter   Medication Reason     diclofenac (VOLTAREN) 1 % GEL topical gel Reorder     Current Outpatient Prescriptions   Medication Sig Dispense Refill     amLODIPine (NORVASC) 2.5 MG tablet Take 1 tablet (2.5 mg) by mouth daily 90 tablet 0     Cholecalciferol (VITAMIN D3  PO) Take 1,000 Units by mouth daily       citalopram (CELEXA) 10 MG tablet Take 1 tablet (10 mg) by mouth daily 90 tablet 3     diclofenac (VOLTAREN) 1 % GEL topical gel Apply 2 g topically 4 times daily as needed for moderate pain 1 g 1     lisinopril (PRINIVIL/ZESTRIL) 10 MG tablet Take 1 tablet (10 mg) by mouth daily 90 tablet 3     omega 3 1000 MG CAPS Take 1 g by mouth daily 90 capsule        Dave Perdue MD  [facility]          Active Problem List:     Patient Active Problem List    Diagnosis Date Noted     Benign hypertension 03/14/2013     Priority: High     Vitamin D deficiency 06/24/2015     Priority: Medium     Microalbuminuria 03/17/2013     Priority: Medium     Generalized anxiety disorder 03/14/2013     Priority: Medium     Diagnosis updated by automated process. Provider to review and confirm.       Family history of hypothyroidism 03/14/2013     Priority: Low     Seasonal allergies 12/13/2011     Priority: Low     CARDIOVASCULAR SCREENING; LDL GOAL LESS THAN 160 02/10/2010     Priority: Low            History of Present Illness:     Chief Complaint   Patient presents with     RECHECK       July 13, 2018  Have you ever seen a rheumatologist No Who NA When NA  Joint pain history  Onset: pt is here for a consult. Pt had a MRI of the sternum, clavicle was told by orthopedic that she has arthritis hurts when she has a flare, or when she pushes on the area,she states that it feels like something catches  also has a patch on left side and right hand of dry rash. Has not had any labs drawn   Involved joints: see above   Pain scale:  1/10     Wakes the patient from sleep : No/ it does when she has a flare  Morning stiffness:Yes for 5 minutes  Meds used:none     Interim history  Since last visit:  1. Infections - No  2. New symptoms/medical problem - No  3. Any side effects from Rheum medications -NA  3. ER visits/Hospitalizations/surgeries - No  4. Last PCP visit: 12/22/17    Wt  Readings from Last 4 Encounters:   08/31/18 77.4 kg (170 lb 11.2 oz)   07/16/18 77.9 kg (171 lb 11.2 oz)   01/17/18 81.2 kg (179 lb)   12/22/17 80.3 kg (177 lb)     Left flank slightly scaly rash X < 6 months  Rash in right hand with skin peeling X 6 months  Small rash in left armpit X < 6 months    No h/o myalgia, unintentional weight loss, fevers, swollen glands  No family or personal history of psoriasis, ulcerative colitis or chron's disease. No h/o iritis.   Patient denies any raynauds  No h/o persistent shortness of breath, cough, chest pain  No h/o persistent vomiting, diarrhea, abdominal pain  No h/o hematochezia, hematuria, hemoptysis  No h/o seizures   No h/o sicca symptoms    August 31, 2018  Have you ever seen a rheumatologist   Yes,   Who you     When  07/16/2018  Joint pain history- collar bone pain and spreading to shoulder  Onset  :Pt is here for a follow up   and to review her MRI results          Pain scale:  5/10   - worse if she moves around at all   Wakes the patient from sleep : Yes, occasionally   Morning stiffness:  Meds used: ibuprofen as needed  Interim history  Since last visit:  1. Infections - No  2. New symptoms/medical problem - No  3. Any side effects from Rheum medications   N/a  3. ER visits/Hospitalizations/surgeries - No  4. Last PCP visit:  12/22/2017      BP Readings from Last 3 Encounters:   08/31/18 106/74   07/16/18 130/88   01/17/18 133/87              Review of Systems:   Complete ROS negative except for symptoms mentioned in the HPI          Past Medical History:     Past Medical History:   Diagnosis Date     Anxiety state, unspecified      Cataract 2014    Left eye     Esophageal reflux      Female stress incontinence 2004     Hypertension      Labyrinthitis, unspecified      LIPOMA SKIN NEC 11/22/2005    right axilla      Menorrhagia 3/14/2013     Other forms of migraine, without mention of intractable migraine without mention of status migrainosus 1998    global,  vomiting     Unspecified essential hypertension 2004     Past Surgical History:   Procedure Laterality Date     C NONSPECIFIC PROCEDURE      reimplantation of ureters age 5     COLONOSCOPY N/A 11/10/2014    Procedure: COLONOSCOPY;  Surgeon: Sherrie Ghosh MD;  Location:  GI     DILATION AND CURETTAGE, ABLATE ENDOMETRIUM THERMACHOICE, COMBINED N/A 12/22/2014    Procedure: COMBINED DILATION AND CURETTAGE, ABLATE ENDOMETRIUM THERMACHOICE;  Surgeon: Sneha Wheat MD;  Location: [facility]     GI SURGERY      ureter implant as a child     HC COLP CERVIX/UPPER VAGINA  1996    after stillbirth     HEAD & NECK SURGERY      wisdom teeth removed     KERATOTOMY ARCUATE WITH FEMTOSECOND LASER/IMAGING FOR ATIOL Left 11/11/2015    Procedure: KERATOTOMY ARCUATE WITH FEMTOSECOND LASER/IMAGING FOR ATIOL;  Surgeon: Riley Salazar MD;  Location: [facility]     KERATOTOMY ARCUATE WITH FEMTOSECOND LASER/IMAGING FOR ATIOL Right 11/16/2016    Procedure: KERATOTOMY ARCUATE WITH FEMTOSECOND LASER/IMAGING FOR ATIOL;  Surgeon: Riely Salazar MD;  Location: [facility]     OPERATIVE HYSTEROSCOPY WITH MORCELLATOR N/A 12/22/2014    Procedure: OPERATIVE HYSTEROSCOPY WITH MORCELLATOR (LUNDBERG & NEPHEW);  Surgeon: Sneha Wheat MD;  Location:  [facility]     PHACOEMULSIFICATION CLEAR CORNEA WITH TORIC INTRAOCULAR LENS IMPLANT Left 11/11/2015    Procedure: PHACOEMULSIFICATION CLEAR CORNEA WITH TORIC INTRAOCULAR LENS IMPLANT;  Surgeon: Riley Salazar MD;  Location: [facility]     PHACOEMULSIFICATION CLEAR CORNEA WITH TORIC INTRAOCULAR LENS IMPLANT Right 11/16/2016    Procedure: PHACOEMULSIFICATION CLEAR CORNEA WITH TORIC INTRAOCULAR LENS IMPLANT;  Surgeon: Riley Salazar MD;  Location: [facility]            Social History:     Social History     Occupational History     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[employer] [employer]     Social History Main Topics     Smoking status: Never Smoker     Smokeless tobacco: Never Used     Alcohol use Yes       Comment: rare     Drug use: No     Sexual activity: Yes     Partners: Male      Comment: vasectomy            Family History:     Family History   Problem Relation Age of Onset     Hypertension Mother      Thyroid Disease Mother      goiter     Lipids Mother      70s     Hypertension Father      Thyroid Disease Father      hypothyroidism     Hypertension Brother      Connective Tissue Disorder Child      spondyloarthropathy            Allergies:     Allergies   Allergen Reactions     Zoloft      extreme fatigue and nausea            Medications:     Current Outpatient Prescriptions   Medication Sig Dispense Refill     amLODIPine (NORVASC) 2.5 MG tablet Take 1 tablet (2.5 mg) by mouth daily 90 tablet 0     Cholecalciferol (VITAMIN D3 PO) Take 1,000 Units by mouth daily       citalopram (CELEXA) 10 MG tablet Take 1 tablet (10 mg) by mouth daily 90 tablet 3     diclofenac (VOLTAREN) 1 % GEL topical gel Apply 2 g topically 4 times daily as needed for moderate pain 1 g 1     lisinopril (PRINIVIL/ZESTRIL) 10 MG tablet Take 1 tablet (10 mg) by mouth daily 90 tablet 3     omega 3 1000 MG CAPS Take 1 g by mouth daily 90 capsule             Physical Exam:   Blood pressure 106/74, pulse 64, temperature 97.8  F (36.6  C), temperature source Oral, weight 77.4 kg (170 lb 11.2 oz), SpO2 99 %.  Wt Readings from Last 4 Encounters:   08/31/18 77.4 kg (170 lb 11.2 oz)   07/16/18 77.9 kg (171 lb 11.2 oz)   01/17/18 81.2 kg (179 lb)   12/22/17 80.3 kg (177 lb)     Constitutional: well-developed, appearing stated age; cooperative  Left sternoclavicular joint is not warm but swollen and slightly tender.   Skin: no rash in exposed areas  Psych: nl judgement, orientation, memory, affect.         Data:         Dave Perdue MD    [facility]

## 2018-08-31 ENCOUNTER — TELEPHONE (OUTPATIENT)
Dept: RHEUMATOLOGY | Facility: CLINIC | Age: 53
End: 2018-08-31

## 2018-08-31 ENCOUNTER — OFFICE VISIT (OUTPATIENT)
Dept: RHEUMATOLOGY | Facility: CLINIC | Age: 53
End: 2018-08-31
Payer: COMMERCIAL

## 2018-08-31 VITALS
TEMPERATURE: 97.8 F | BODY MASS INDEX: 25.21 KG/M2 | DIASTOLIC BLOOD PRESSURE: 74 MMHG | SYSTOLIC BLOOD PRESSURE: 106 MMHG | OXYGEN SATURATION: 99 % | HEART RATE: 64 BPM | WEIGHT: 170.7 LBS

## 2018-08-31 DIAGNOSIS — M25.512 PAIN OF LEFT STERNOCLAVICULAR JOINT: Primary | ICD-10-CM

## 2018-08-31 PROCEDURE — 99213 OFFICE O/P EST LOW 20 MIN: CPT | Performed by: INTERNAL MEDICINE

## 2018-08-31 ASSESSMENT — ROUTINE ASSESSMENT OF PATIENT INDEX DATA (RAPID3)
RAPID3 INTERPRETATION: MODERATE 6.1-12.0
TOTAL RAPID3 SCORE: 8

## 2018-08-31 NOTE — TELEPHONE ENCOUNTER
----- Message from Dave Perdue MD sent at 8/31/2018  3:44 PM CDT -----  Regarding: [facility] ortho and MRI done 2017- waiting to receive records  We have not received records including MRI report from [facility] yet. Release signed last visit. Please ask them to fax it today to us.   Thanks

## 2018-08-31 NOTE — MR AVS SNAPSHOT
After Visit Summary   8/31/2018    Josy John    MRN: [id number]           Patient Information     Date Of Birth          1965        Visit Information        Provider Department      8/31/2018 3:00 PM Dave Perdue MD [facility]        Today's Diagnoses     Pain of left sternoclavicular joint    -  1       Follow-ups after your visit        Who to contact     If you have questions or need follow up information about today's clinic visit or your schedule please contact [facility] directly at [phone].  Normal or non-critical lab and imaging results will be communicated to you by AT Internethart, letter or phone within 4 business days after the clinic has received the results. If you do not hear from us within 7 days, please contact the clinic through AT Internethart or phone. If you have a critical or abnormal lab result, we will notify you by phone as soon as possible.  Submit refill requests through [employer] or call your pharmacy and they will forward the refill request to us. Please allow 3 business days for your refill to be completed.          Additional Information About Your Visit        MyChart Information     [employer] gives you secure access to your electronic health record. If you see a primary care provider, you can also send messages to your care team and make appointments. If you have questions, please call your primary care clinic.  If you do not have a primary care provider, please call [phone] and they will assist you.        Care EveryWhere ID     This is your Care EveryWhere ID. This could be used by other organizations to access your [place] medical records  [id number]        Your Vitals Were     Pulse Temperature Pulse Oximetry BMI (Body Mass Index)          64 97.8  F (36.6  C) (Oral) 99% 25.21 kg/m2         Blood Pressure from Last 3 Encounters:   08/31/18 106/74   07/16/18 130/88   01/17/18 133/87    Weight from Last 3 Encounters:   08/31/18  77.4 kg (170 lb 11.2 oz)   07/16/18 77.9 kg (171 lb 11.2 oz)   01/17/18 81.2 kg (179 lb)              Today, you had the following     No orders found for display         Today's Medication Changes          These changes are accurate as of 8/31/18  3:46 PM.  If you have any questions, ask your nurse or doctor.               Start taking these medicines.        Dose/Directions    diclofenac 1 % Gel topical gel   Commonly known as:  VOLTAREN   Used for:  Pain of left sternoclavicular joint   Started by:  Dave Perdue MD        Dose:  2 g   Apply 2 g topically 4 times daily   Quantity:  1 g   Refills:  1            Where to get your medicines      These medications were sent to [facility] - [address]   [address], [place] [address]     Phone:  [phone]     diclofenac 1 % Gel topical gel                Primary Care Provider Office Phone # Fax #    Samantha Mcdonald [phone]       [address] [place] [address]        Equal Access to Services     [facility] AH: Hadii aad ku hadasho Soomaali, waaxda luqadaha, qaybta kaalmada miguel angelyaben, pipo levy . So [facility] [phone].    ATENCIÓN: Si habla español, tiene a cid disposición servicios gratuitos de asistencia lingüística. Llame al [phone].    We comply with applicable federal civil rights laws and [place] laws. We do not discriminate on the basis of race, color, national origin, age, disability, sex, sexual orientation, or gender identity.            Thank you!     Thank you for choosing [facility]  for your care. Our goal is always to provide you with excellent care. Hearing back from our patients is one way we can continue to improve our services. Please take a few minutes to complete the written survey that you may receive in the mail after your visit with us. Thank you!             Your Updated  Medication List - Protect others around you: Learn how to safely use, store and throw away your medicines at www.disposemymeds.org.          This list is accurate as of 8/31/18  3:46 PM.  Always use your most recent med list.                   Brand Name Dispense Instructions for use Diagnosis    amLODIPine 2.5 MG tablet    NORVASC    90 tablet    Take 1 tablet (2.5 mg) by mouth daily    Benign hypertension       citalopram 10 MG tablet    celeXA    90 tablet    Take 1 tablet (10 mg) by mouth daily    Generalized anxiety disorder       diclofenac 1 % Gel topical gel    VOLTAREN    1 g    Apply 2 g topically 4 times daily    Pain of left sternoclavicular joint       lisinopril 10 MG tablet    PRINIVIL/ZESTRIL    90 tablet    Take 1 tablet (10 mg) by mouth daily    Benign hypertension       omega 3 1000 MG Caps     90 capsule    Take 1 g by mouth daily        VITAMIN D3 PO      Take 1,000 Units by mouth daily

## 2018-08-31 NOTE — NURSING NOTE
"Chief Complaint   Patient presents with     RECHECK       Initial /74  Pulse 64  Temp 97.8  F (36.6  C) (Oral)  Wt 77.4 kg (170 lb 11.2 oz)  SpO2 99%  BMI 25.21 kg/m2 Estimated body mass index is 25.21 kg/(m^2) as calculated from the following:    Height as of 7/16/18: 1.753 m (5' 9\").    Weight as of this encounter: 77.4 kg (170 lb 11.2 oz).  Medication Reconciliation: complete    Have you ever seen a rheumatologist   Yes,   Who you     When  07/16/2018  Joint pain history- collar bone pain and spreading to shoulder  Onset  :Pt is here for a follow up   and to review her MRI results         Pain scale:  5/10   - worse if she moves around at all   Wakes the patient from sleep : Yes, occasionally   Morning stiffness:  Meds used: ibuprofen as needed  Interim history  Since last visit:  1. Infections - No  2. New symptoms/medical problem - No  3. Any side effects from Rheum medications   N/a  3. ER visits/Hospitalizations/surgeries - No  4. Last PCP visit:  12/22/2017  Wt Readings from Last 4 Encounters:   08/31/18 77.4 kg (170 lb 11.2 oz)   07/16/18 77.9 kg (171 lb 11.2 oz)   01/17/18 81.2 kg (179 lb)   12/22/17 80.3 kg (177 lb)     BP Readings from Last 3 Encounters:   08/31/18 106/74   07/16/18 130/88   01/17/18 133/87       "

## 2018-09-24 DIAGNOSIS — F41.1 GENERALIZED ANXIETY DISORDER: ICD-10-CM

## 2018-09-24 NOTE — TELEPHONE ENCOUNTER
"Requested Prescriptions   Pending Prescriptions Disp Refills     citalopram (CELEXA) 10 MG tablet [Pharmacy Med Name: CITALOPRAM HYDROBROMIDE 10MG TABS]    Last Written Prescription Date:  8/1/2017  Last Fill Quantity: 90,  # refills: 3   Last office visit: 12/22/2017 with prescribing provider:  Samantha Mcdonald     Future Office Visit:     90 tablet 3     Sig: TAKE ONE TABLET BY MOUTH EVERY DAY    SSRIs Protocol Passed    9/24/2018 10:57 AM       Passed - Recent (12 mo) or future (30 days) visit within the authorizing provider's specialty    Patient had office visit in the last 12 months or has a visit in the next 30 days with authorizing provider or within the authorizing provider's specialty.  See \"Patient Info\" tab in inbasket, or \"Choose Columns\" in Meds & Orders section of the refill encounter.           Passed - Patient is age 18 or older       Passed - No active pregnancy on record       Passed - No positive pregnancy test in last 12 months          "

## 2018-09-26 RX ORDER — CITALOPRAM HYDROBROMIDE 10 MG/1
TABLET ORAL
Qty: 30 TABLET | Refills: 0 | Status: SHIPPED | OUTPATIENT
Start: 2018-09-26 | End: 2018-11-05

## 2018-09-26 NOTE — TELEPHONE ENCOUNTER
Medication is being filled for 1 time refill only due to:  Patient needs to be seen because due for physical.   Joanie ARMIJO RN

## 2018-11-01 DIAGNOSIS — I10 BENIGN HYPERTENSION: ICD-10-CM

## 2018-11-01 RX ORDER — AMLODIPINE BESYLATE 2.5 MG/1
2.5 TABLET ORAL DAILY
Qty: 90 TABLET | Refills: 0 | Status: SHIPPED | OUTPATIENT
Start: 2018-11-01 | End: 2018-11-27

## 2018-11-05 DIAGNOSIS — F41.1 GENERALIZED ANXIETY DISORDER: ICD-10-CM

## 2018-11-05 NOTE — TELEPHONE ENCOUNTER
"Requested Prescriptions   Pending Prescriptions Disp Refills     citalopram (CELEXA) 10 MG tablet [Pharmacy Med Name: CITALOPRAM HYDROBROMIDE 10MG TABS]  Last Written Prescription Date:  09/26/2018  Last Fill Quantity: 30 tablet,  # refills: 0   Last office visit: 12/22/2017 with prescribing provider:  Samantha Mcdonald MD    Future Office Visit:   Next 5 appointments (look out 90 days)     Nov 27, 2018  3:20 PM CST   PHYSICAL with Samantha Mcdonald MD   [facility]    [address] [place] [address]  [address]  [facility] [address] [phone]                  30 tablet 0     Sig: TAKE ONE TABLET BY MOUTH EVERY DAY (NEED TO BE SEEN IN CLINIC FOR FURTHER REFILLS)    SSRIs Protocol Passed    11/5/2018  3:11 PM   PHQ-9 SCORE 5/6/2008 8/1/2017   Total Score 0 -   Total Score - 0     ALICIA-7 SCORE 4/2/2014 6/20/2016 8/1/2017   Total Score 1 - -   Total Score - 2 0           Passed - Recent (12 mo) or future (30 days) visit within the authorizing provider's specialty    Patient had office visit in the last 12 months or has a visit in the next 30 days with authorizing provider or within the authorizing provider's specialty.  See \"Patient Info\" tab in inbasket, or \"Choose Columns\" in Meds & Orders section of the refill encounter.             Passed - Patient is age 18 or older       Passed - No active pregnancy on record       Passed - No positive pregnancy test in last 12 months          "

## 2018-11-08 RX ORDER — CITALOPRAM HYDROBROMIDE 10 MG/1
TABLET ORAL
Qty: 30 TABLET | Refills: 0 | Status: SHIPPED | OUTPATIENT
Start: 2018-11-08 | End: 2018-12-04

## 2018-11-08 NOTE — TELEPHONE ENCOUNTER
Routing refill request to provider for review/approval because:  Renée given x1 and patient did not follow up, has future appointment scheduled.  Patient needs to be seen because:  Due for physical  Mayra Kit RN - Triage  [facility]

## 2018-11-27 ENCOUNTER — OFFICE VISIT (OUTPATIENT)
Dept: PEDIATRICS | Facility: CLINIC | Age: 53
End: 2018-11-27
Payer: COMMERCIAL

## 2018-11-27 VITALS
HEIGHT: 69 IN | WEIGHT: 173.2 LBS | HEART RATE: 61 BPM | DIASTOLIC BLOOD PRESSURE: 70 MMHG | BODY MASS INDEX: 25.65 KG/M2 | OXYGEN SATURATION: 99 % | SYSTOLIC BLOOD PRESSURE: 118 MMHG | TEMPERATURE: 98.3 F

## 2018-11-27 DIAGNOSIS — Z00.00 ENCOUNTER FOR WELL ADULT EXAM WITHOUT ABNORMAL FINDINGS: Primary | ICD-10-CM

## 2018-11-27 DIAGNOSIS — I10 BENIGN HYPERTENSION: ICD-10-CM

## 2018-11-27 DIAGNOSIS — F41.1 GENERALIZED ANXIETY DISORDER: ICD-10-CM

## 2018-11-27 DIAGNOSIS — R80.9 MICROALBUMINURIA: ICD-10-CM

## 2018-11-27 PROCEDURE — 99396 PREV VISIT EST AGE 40-64: CPT | Performed by: INTERNAL MEDICINE

## 2018-11-27 PROCEDURE — 80048 BASIC METABOLIC PNL TOTAL CA: CPT | Performed by: INTERNAL MEDICINE

## 2018-11-27 PROCEDURE — 82043 UR ALBUMIN QUANTITATIVE: CPT | Performed by: INTERNAL MEDICINE

## 2018-11-27 PROCEDURE — 36415 COLL VENOUS BLD VENIPUNCTURE: CPT | Performed by: INTERNAL MEDICINE

## 2018-11-27 RX ORDER — CITALOPRAM HYDROBROMIDE 10 MG/1
10 TABLET ORAL DAILY
Qty: 90 TABLET | Refills: 3 | Status: SHIPPED | OUTPATIENT
Start: 2018-11-27 | End: 2019-11-17

## 2018-11-27 RX ORDER — LISINOPRIL 10 MG/1
10 TABLET ORAL DAILY
Qty: 90 TABLET | Refills: 3 | Status: SHIPPED | OUTPATIENT
Start: 2018-11-27 | End: 2018-12-04

## 2018-11-27 RX ORDER — AMLODIPINE BESYLATE 2.5 MG/1
2.5 TABLET ORAL DAILY
Qty: 90 TABLET | Refills: 3 | Status: SHIPPED | OUTPATIENT
Start: 2018-11-27 | End: 2020-03-16

## 2018-11-27 ASSESSMENT — ENCOUNTER SYMPTOMS
DIZZINESS: 0
CONSTIPATION: 0
FEVER: 0
ABDOMINAL PAIN: 0
HEMATOCHEZIA: 0
COUGH: 0
DIARRHEA: 0
EYE PAIN: 0
FREQUENCY: 0
HEMATURIA: 0
NERVOUS/ANXIOUS: 0
CHILLS: 0

## 2018-11-27 ASSESSMENT — ANXIETY QUESTIONNAIRES
5. BEING SO RESTLESS THAT IT IS HARD TO SIT STILL: NOT AT ALL
3. WORRYING TOO MUCH ABOUT DIFFERENT THINGS: SEVERAL DAYS
1. FEELING NERVOUS, ANXIOUS, OR ON EDGE: NOT AT ALL
GAD7 TOTAL SCORE: 1
2. NOT BEING ABLE TO STOP OR CONTROL WORRYING: NOT AT ALL
7. FEELING AFRAID AS IF SOMETHING AWFUL MIGHT HAPPEN: NOT AT ALL
6. BECOMING EASILY ANNOYED OR IRRITABLE: NOT AT ALL
IF YOU CHECKED OFF ANY PROBLEMS ON THIS QUESTIONNAIRE, HOW DIFFICULT HAVE THESE PROBLEMS MADE IT FOR YOU TO DO YOUR WORK, TAKE CARE OF THINGS AT HOME, OR GET ALONG WITH OTHER PEOPLE: NOT DIFFICULT AT ALL

## 2018-11-27 ASSESSMENT — PATIENT HEALTH QUESTIONNAIRE - PHQ9
SUM OF ALL RESPONSES TO PHQ QUESTIONS 1-9: 0
5. POOR APPETITE OR OVEREATING: NOT AT ALL

## 2018-11-27 NOTE — PROGRESS NOTES
"  SUBJECTIVE:   Josy John is a 53 year old female who presents to clinic today for the following health issues:      Depression and Anxiety Follow-Up    Status since last visit: { :583371::\"No change\"}    Other associated symptoms:{ :500758::\"None\"}    Complicating factors:     Significant life event: { :719569::\"No\"}     Current substance abuse: { :513206::\"None\"}    PHQ 8/1/2017   PHQ-9 Total Score 0   Q9: Suicide Ideation Not at all     ALICIA-7 SCORE 4/2/2014 6/20/2016 8/1/2017   Total Score 1 - -   Total Score - 2 0     {PROVIDER ONLY Complete follow-up questions for patients who report suicide ideation  (Optional):906100}  PHQ-9  English  PHQ-9   Any Language  ALICIA-7  Suicide Assessment Five-step Evaluation and Treatment (SAFE-T)    Amount of exercise or physical activity: {Exercise frequency days per week:204542}    Problems taking medications regularly: {Med Problems:583994::\"No\"}    Medication side effects: {CHRONIC MED SIDE EFFECTS:499569::\"none\"}    Diet: { :385359}        {additional problems for provider to add:130753}    Problem list and histories reviewed & adjusted, as indicated.  Additional history: {NONE - AS DOCUMENTED:850222::\"as documented\"}    {HIST REVIEW/ LINKS 2:086907}    Reviewed and updated as needed this visit by clinical staff       Reviewed and updated as needed this visit by Provider         {PROVIDER CHARTING PREFERENCE:232090}  "

## 2018-11-27 NOTE — PATIENT INSTRUCTIONS
Can try salt water or gel nasal moisturizers to help with dry sinuses. Also using humidifiers in the house, particularly the bedroom.     Work on coping for the times when you have the breakthrough anxiety.     Mindfulness Based Stress Reduction (MBSR) is about taking control of your life, being aware of influences that affect your well-being and health, and finding peace of mind and balance in an oftentimes chaotic world.   MBSR will teach you to consciously and methodically deal with stress, pain, illness, and the demanding challenges of everyday life.    I recommend Mindfulness Based Stress Reduction (MBSR) courses through the [facility] at the [facility].     Phone:  [phone]    Website:   [email or web address]     The other option is an online course through the [facility]: Clifton Bragg

## 2018-11-27 NOTE — MR AVS SNAPSHOT
After Visit Summary   11/27/2018    Josy John    MRN: [id number]           Patient Information     Date Of Birth          1965        Visit Information        Provider Department      11/27/2018 3:20 PM [facility]cdonald [facility]        Today's Diagnoses     Encounter for well adult exam without abnormal findings    -  1    Benign hypertension        Microalbuminuria        Generalized anxiety disorder          Care Instructions    Can try salt water or gel nasal moisturizers to help with dry sinuses. Also using humidifiers in the house, particularly the bedroom.     Work on coping for the times when you have the breakthrough anxiety.     Mindfulness Based Stress Reduction (MBSR) is about taking control of your life, being aware of influences that affect your well-being and health, and finding peace of mind and balance in an oftentimes chaotic world.   MBSR will teach you to consciously and methodically deal with stress, pain, illness, and the demanding challenges of everyday life.    I recommend Mindfulness Based Stress Reduction (MBSR) courses through the [facility] at the [facility].     Phone:  [phone]    Website:   [email or web address]     The other option is an online course through the [facility]: [place]                Follow-ups after your visit        Follow-up notes from your care team     Return in 1 year (on 11/26/2019) for Follow up.      Your next 10 appointments already scheduled     Nov 26, 2019  3:20 PM CST   Office Visit with Samantha Mcdonald MD   [facility] [place] ([facility])    [address] [place] [address]  [address]  [facility] [id number] [phone]           Bring a current list of meds and any records pertaining to this visit. For Physicals, please bring immunization records and any forms needing to be filled out.  "Please arrive 10 minutes early to complete paperwork.              Who to contact     If you have questions or need follow up information about today's clinic visit or your schedule please contact [facility] [place] directly at [phone].  Normal or non-critical lab and imaging results will be communicated to you by MyChart, letter or phone within 4 business days after the clinic has received the results. If you do not hear from us within 7 days, please contact the clinic through [employer] or phone. If you have a critical or abnormal lab result, we will notify you by phone as soon as possible.  Submit refill requests through [employer] or call your pharmacy and they will forward the refill request to us. Please allow 3 business days for your refill to be completed.          Additional Information About Your Visit        [employer] [employer] Information     [employer] gives you secure access to your electronic health record. If you see a primary care provider, you can also send messages to your care team and make appointments. If you have questions, please call your primary care clinic.  If you do not have a primary care provider, please call [phone] and they will assist you.        Care EveryWhere ID     This is your Care EveryWhere ID. This could be used by other organizations to access your [place] medical records  [id number]        Your Vitals Were     Pulse Temperature Height Pulse Oximetry BMI (Body Mass Index)       61 98.3  F (36.8  C) (Oral) 5' 9\" (1.753 m) 99% 25.58 kg/m2        Blood Pressure from Last 3 Encounters:   11/27/18 118/70   08/31/18 106/74   07/16/18 130/88    Weight from Last 3 Encounters:   11/27/18 173 lb 3.2 oz (78.6 kg)   08/31/18 170 lb 11.2 oz (77.4 kg)   07/16/18 171 lb 11.2 oz (77.9 kg)              We Performed the Following     Albumin Random Urine Quantitative with Creat Ratio     Basic metabolic panel          Today's Medication Changes          These changes are accurate as of 11/27/18  " 4:28 PM.  If you have any questions, ask your nurse or doctor.               These medicines have changed or have updated prescriptions.        Dose/Directions    * citalopram 10 MG tablet   Commonly known as:  celeXA   This may have changed:  Another medication with the same name was added. Make sure you understand how and when to take each.   Used for:  Generalized anxiety disorder   Changed by:  Samantha Mcdonald MD        TAKE ONE TABLET BY MOUTH EVERY DAY (NEED TO BE SEEN IN CLINIC FOR FURTHER REFILLS)   Quantity:  30 tablet   Refills:  0       * citalopram 10 MG tablet   Commonly known as:  celeXA   This may have changed:  You were already taking a medication with the same name, and this prescription was added. Make sure you understand how and when to take each.   Used for:  Generalized anxiety disorder   Changed by:  Samantha Mcdonald MD        Dose:  10 mg   Take 1 tablet (10 mg) by mouth daily   Quantity:  90 tablet   Refills:  3       * Notice:  This list has 2 medication(s) that are the same as other medications prescribed for you. Read the directions carefully, and ask your doctor or other care provider to review them with you.         Where to get your medicines      These medications were sent to [facility] - [address]   [address], [place] [address]     Phone:  [phone]     amLODIPine 2.5 MG tablet    citalopram 10 MG tablet    lisinopril 10 MG tablet                Primary Care Provider Office Phone # Fax #    Samantha Mcdonald [phone]       [facility] [place] [address]        Equal Access to Services     [facility] AH: Hadii aad ku hadasho Soomaali, waaxda luqadaha, qaybta kaalmada adeegyada, pipo olea. So [facility] [phone].    ATENCIÓN: Si habla español, tiene a cid disposición servicios gratuitos de asistencia lingüística. Llame al  [phone].    We comply with applicable federal civil rights laws and [place] laws. We do not discriminate on the basis of race, color, national origin, age, disability, sex, sexual orientation, or gender identity.            Thank you!     Thank you for choosing [facility]  for your care. Our goal is always to provide you with excellent care. Hearing back from our patients is one way we can continue to improve our services. Please take a few minutes to complete the written survey that you may receive in the mail after your visit with us. Thank you!             Your Updated Medication List - Protect others around you: Learn how to safely use, store and throw away your medicines at www.disposemymeds.org.          This list is accurate as of 11/27/18  4:28 PM.  Always use your most recent med list.                   Brand Name Dispense Instructions for use Diagnosis    amLODIPine 2.5 MG tablet    NORVASC    90 tablet    Take 1 tablet (2.5 mg) by mouth daily    Benign hypertension       * citalopram 10 MG tablet    celeXA    30 tablet    TAKE ONE TABLET BY MOUTH EVERY DAY (NEED TO BE SEEN IN CLINIC FOR FURTHER REFILLS)    Generalized anxiety disorder       * citalopram 10 MG tablet    celeXA    90 tablet    Take 1 tablet (10 mg) by mouth daily    Generalized anxiety disorder       diclofenac 1 % topical gel    VOLTAREN    1 g    Apply 2 g topically 4 times daily as needed for moderate pain    Pain of left sternoclavicular joint       lisinopril 10 MG tablet    PRINIVIL/ZESTRIL    90 tablet    Take 1 tablet (10 mg) by mouth daily    Benign hypertension       omega 3 1000 MG Caps     90 capsule    Take 1 g by mouth daily        VITAMIN D3 PO      Take 1,000 Units by mouth daily        * Notice:  This list has 2 medication(s) that are the same as other medications prescribed for you. Read the directions carefully, and ask your doctor or other care provider to review them with you.

## 2018-11-27 NOTE — PROGRESS NOTES
"   SUBJECTIVE:   CC: Josy John is an 53 year old woman who presents for preventive health visit.     Physical   Annual:     Getting at least 3 servings of Calcium per day:  Yes    Bi-annual eye exam:  Yes    Dental care twice a year:  NO    Sleep apnea or symptoms of sleep apnea:  None    Diet:  Low salt    Frequency of exercise:  6-7 days/week    Duration of exercise:  30-45 minutes    Taking medications regularly:  Yes    Medication side effects:  None    Additional concerns today:  Yes    PHQ-2 Total Score: 0    Patient reports she has dry sinuses and was wondering if there was a nasal spray she could use.   States her BP has been good; denies signs/sx of hypertension. Mood is well controlled; has some anxiety from stress. Anxiety comes and goes in \"spurts\".       Today's PHQ-2 Score:   PHQ-2 ( 1999 [employer]) 11/27/2018   Q1: Little interest or pleasure in doing things 0   Q2: Feeling down, depressed or hopeless 0   PHQ-2 Score 0   Q1: Little interest or pleasure in doing things Not at all   Q2: Feeling down, depressed or hopeless Not at all   PHQ-2 Score 0       Abuse: Current or Past(Physical, Sexual or Emotional)- No  Do you feel safe in your environment? Yes    Social History   Substance Use Topics     Smoking status: Never Smoker     Smokeless tobacco: Never Used     Alcohol use Yes      Comment: rare     Alcohol Use 11/27/2018   If you drink alcohol do you typically have greater than 3 drinks per day OR greater than 7 drinks per week? No       Reviewed orders with patient.  Reviewed health maintenance and updated orders accordingly - Yes  Labs reviewed in EPIC  BP Readings from Last 3 Encounters:   11/27/18 118/70   08/31/18 106/74   07/16/18 130/88    Wt Readings from Last 3 Encounters:   11/27/18 78.6 kg (173 lb 3.2 oz)   08/31/18 77.4 kg (170 lb 11.2 oz)   07/16/18 77.9 kg (171 lb 11.2 oz)                  Patient Active Problem List   Diagnosis     CARDIOVASCULAR SCREENING; LDL GOAL LESS THAN 160     " Seasonal allergies     Benign hypertension     Generalized anxiety disorder     Family history of hypothyroidism     Microalbuminuria     Vitamin D deficiency     Past Surgical History:   Procedure Laterality Date     C NONSPECIFIC PROCEDURE      reimplantation of ureters age 5     COLONOSCOPY N/A 11/10/2014    Procedure: COLONOSCOPY;  Surgeon: Sherrie Ghosh MD;  Location:  GI     DILATION AND CURETTAGE, ABLATE ENDOMETRIUM THERMACHOICE, COMBINED N/A 12/22/2014    Procedure: COMBINED DILATION AND CURETTAGE, ABLATE ENDOMETRIUM THERMACHOICE;  Surgeon: Sneha Wheat MD;  Location: [facility]     GI SURGERY      ureter implant as a child     HC COLP CERVIX/UPPER VAGINA  1996    after stillbirth     HEAD & NECK SURGERY      wisdom teeth removed     KERATOTOMY ARCUATE WITH FEMTOSECOND LASER/IMAGING FOR ATIOL Left 11/11/2015    Procedure: KERATOTOMY ARCUATE WITH FEMTOSECOND LASER/IMAGING FOR ATIOL;  Surgeon: Riley Salazar MD;  Location: [facility]     KERATOTOMY ARCUATE WITH FEMTOSECOND LASER/IMAGING FOR ATIOL Right 11/16/2016    Procedure: KERATOTOMY ARCUATE WITH FEMTOSECOND LASER/IMAGING FOR ATIOL;  Surgeon: Riley Salazar MD;  Location: [facility]     OPERATIVE HYSTEROSCOPY WITH MORCELLATOR N/A 12/22/2014    Procedure: OPERATIVE HYSTEROSCOPY WITH MORCELLATOR (LUNDBERG & NEPHEW);  Surgeon: Sneha Wheat MD;  Location: [facility]     PHACOEMULSIFICATION CLEAR CORNEA WITH TORIC INTRAOCULAR LENS IMPLANT Left 11/11/2015    Procedure: PHACOEMULSIFICATION CLEAR CORNEA WITH TORIC INTRAOCULAR LENS IMPLANT;  Surgeon: Riley Salazar MD;  Location: [facility]     PHACOEMULSIFICATION CLEAR CORNEA WITH TORIC INTRAOCULAR LENS IMPLANT Right 11/16/2016    Procedure: PHACOEMULSIFICATION CLEAR CORNEA WITH TORIC INTRAOCULAR LENS IMPLANT;  Surgeon: Riley Salazar MD;  Location: [facility]       Social History   Substance Use Topics     Smoking status: Never Smoker     Smokeless tobacco: Never Used     Alcohol use Yes      Comment: rare     Family  History   Problem Relation Age of Onset     Hypertension Mother      Thyroid Disease Mother      goiter     Lipids Mother      70s     Hypertension Father      Thyroid Disease Father      hypothyroidism     Hypertension Brother      Connective Tissue Disorder Child      spondyloarthropathy         Current Outpatient Prescriptions   Medication Sig Dispense Refill     amLODIPine (NORVASC) 2.5 MG tablet Take 1 tablet (2.5 mg) by mouth daily 90 tablet 0     Cholecalciferol (VITAMIN D3 PO) Take 1,000 Units by mouth daily       citalopram (CELEXA) 10 MG tablet TAKE ONE TABLET BY MOUTH EVERY DAY (NEED TO BE SEEN IN CLINIC FOR FURTHER REFILLS) 30 tablet 0     lisinopril (PRINIVIL/ZESTRIL) 10 MG tablet Take 1 tablet (10 mg) by mouth daily 90 tablet 3     omega 3 1000 MG CAPS Take 1 g by mouth daily 90 capsule      diclofenac (VOLTAREN) 1 % GEL topical gel Apply 2 g topically 4 times daily as needed for moderate pain 1 g 1     Allergies   Allergen Reactions     Zoloft      extreme fatigue and nausea       Mammogram Screening: Patient over age 50, mutual decision to screen reflected in health maintenance.    Pertinent mammograms are reviewed under the imaging tab.  History of abnormal Pap smear:   Last 3 Pap Results:   PAP (no units)   Date Value   05/14/2009 NIL   05/06/2008 NIL   11/22/2005 NIL     PAP / HPV 5/14/2009 5/6/2008 11/22/2005   PAP NIL NIL NIL     Reviewed and updated as needed this visit by clinical staff  Tobacco  Allergies  Meds  Med Hx  Surg Hx  Fam Hx  Soc Hx        Reviewed and updated as needed this visit by Provider            Review of Systems   Constitutional: Negative for chills and fever.   HENT: Negative for congestion and ear pain.    Eyes: Negative for pain.   Respiratory: Negative for cough.    Cardiovascular: Negative for chest pain.   Gastrointestinal: Negative for abdominal pain, constipation, diarrhea and hematochezia.   Genitourinary: Negative for frequency, genital sores and hematuria.  "  Neurological: Negative for dizziness.   Psychiatric/Behavioral: The patient is not nervous/anxious.        This document serves as a record of the services and decisions personally performed and made by Samantha Mcdonald MD. It was created on her behalf by Dior Willoughby, a trained medical scribe. The creation of this document is based the provider's statements to the medical scribe.    Dior Willoughby November 27, 2018 3:58 PM   OBJECTIVE:   /70 (BP Location: Right arm, Patient Position: Sitting, Cuff Size: Adult Regular)  Pulse 61  Temp 98.3  F (36.8  C) (Oral)  Ht 1.753 m (5' 9\")  Wt 78.6 kg (173 lb 3.2 oz)  SpO2 99%  BMI 25.58 kg/m2  Physical Exam  GENERAL APPEARANCE: healthy, alert and no distress  EYES: Eyes grossly normal to inspection, PERRL and conjunctivae and sclerae normal  HENT: ear canals and TM's normal, nose and mouth without ulcers or lesions, oropharynx clear and oral mucous membranes moist  NECK: no adenopathy, no asymmetry, masses, or scars and thyroid normal to palpation  RESP: lungs clear to auscultation - no rales, rhonchi or wheezes  BREAST: normal without masses, tenderness or nipple discharge and no palpable axillary masses or adenopathy  CV: regular rate and rhythm, normal S1 S2, no S3 or S4, no murmur, click or rub, no peripheral edema   ABDOMEN: soft, nontender, no hepatosplenomegaly, no masses and bowel sounds normal  MS: no musculoskeletal defects are noted and gait is age appropriate without ataxia  SKIN: no suspicious lesions or rashes  NEURO: Normal strength and tone, sensory exam grossly normal, mentation intact and speech normal  PSYCH: mentation appears normal and affect normal/bright    Diagnostic Test Results:  No results found for this or any previous visit (from the past 24 hour(s)).    ASSESSMENT/PLAN:   (Z00.00) Encounter for well adult exam without abnormal findings  (primary encounter diagnosis)  -- imm utd  -- pap utd through gyn   -- mammo utd   -- " "colonoscopy utd   -- encouraged regular exercise and balanced diet       (I10) Benign hypertension  -- BP well controlled; continue without changes   Plan: amLODIPine (NORVASC) 2.5 MG tablet, lisinopril         (PRINIVIL/ZESTRIL) 10 MG tablet, Basic         metabolic panel, Albumin Random Urine         Quantitative with Creat Ratio      (R80.9) Microalbuminuria  -- recheck today   Plan: Albumin Random Urine Quantitative with Creat         Ratio      (F41.1) Generalized anxiety disorder  -- well controlled on Celexa 10 MG, discussed increasing dose, but her scores reveal is well controlled    -- will continue at Celexa 10 MG without changes   -- recommended learning coping mechanisms for breakthrough anxiety   -- recommended MBSR classes, information provided   Plan: citalopram (CELEXA) 10 MG tablet          Follow up for annual care or as needed           COUNSELING:  Reviewed preventive health counseling, as reflected in patient instructions       Regular exercise       Healthy diet/nutrition    BP Readings from Last 1 Encounters:   11/27/18 118/70     Estimated body mass index is 25.58 kg/(m^2) as calculated from the following:    Height as of this encounter: 1.753 m (5' 9\").    Weight as of this encounter: 78.6 kg (173 lb 3.2 oz).      Weight management plan: Discussed healthy diet and exercise guidelines     reports that she has never smoked. She has never used smokeless tobacco.      Counseling Resources:  ATP IV Guidelines  Pooled Cohorts Equation Calculator  Breast Cancer Risk Calculator  FRAX Risk Assessment  ICSI Preventive Guidelines  Dietary Guidelines for Americans, 2010  USDA's MyPlate  ASA Prophylaxis  Lung CA Screening    The information in this document, created by the medical scribe for me, accurately reflects the services I personally performed and the decisions made by me. I have reviewed and approved this document for accuracy prior to leaving the patient care area.  Samantha Mcdonald, " MD  [facility]

## 2018-11-28 LAB
ANION GAP SERPL CALCULATED.3IONS-SCNC: 13 MMOL/L (ref 3–14)
BUN SERPL-MCNC: 15 MG/DL (ref 7–30)
CALCIUM SERPL-MCNC: 9.5 MG/DL (ref 8.5–10.1)
CHLORIDE SERPL-SCNC: 98 MMOL/L (ref 94–109)
CO2 SERPL-SCNC: 23 MMOL/L (ref 20–32)
CREAT SERPL-MCNC: 0.67 MG/DL (ref 0.52–1.04)
CREAT UR-MCNC: 21 MG/DL
GFR SERPL CREATININE-BSD FRML MDRD: >90 ML/MIN/1.7M2
GLUCOSE SERPL-MCNC: 79 MG/DL (ref 70–99)
MICROALBUMIN UR-MCNC: 62 MG/L
MICROALBUMIN/CREAT UR: 287.38 MG/G CR (ref 0–25)
POTASSIUM SERPL-SCNC: 4.2 MMOL/L (ref 3.4–5.3)
SODIUM SERPL-SCNC: 134 MMOL/L (ref 133–144)

## 2018-11-28 ASSESSMENT — ANXIETY QUESTIONNAIRES: GAD7 TOTAL SCORE: 1

## 2018-12-03 ENCOUNTER — MYC MEDICAL ADVICE (OUTPATIENT)
Dept: PEDIATRICS | Facility: CLINIC | Age: 53
End: 2018-12-03

## 2018-12-03 DIAGNOSIS — I10 BENIGN HYPERTENSION: Primary | ICD-10-CM

## 2018-12-03 DIAGNOSIS — R80.9 MICROALBUMINURIA: ICD-10-CM

## 2018-12-03 NOTE — TELEPHONE ENCOUNTER
Pt sent a [employer] message stating that she is unable to get in with a nephrologist until Feb 2019. Pt would like to be seen this month.   Would  like her seen sooner.      Please advise-    Brooke Valdovinos RN

## 2018-12-04 ENCOUNTER — TRANSFERRED RECORDS (OUTPATIENT)
Dept: HEALTH INFORMATION MANAGEMENT | Facility: CLINIC | Age: 53
End: 2018-12-04

## 2018-12-04 RX ORDER — LISINOPRIL 20 MG/1
20 TABLET ORAL DAILY
Qty: 90 TABLET | Refills: 1 | Status: SHIPPED | OUTPATIENT
Start: 2018-12-04 | End: 2019-10-24

## 2018-12-18 DIAGNOSIS — R80.9 MICROALBUMINURIA: Primary | ICD-10-CM

## 2018-12-27 ENCOUNTER — OFFICE VISIT (OUTPATIENT)
Dept: NEPHROLOGY | Facility: CLINIC | Age: 53
End: 2018-12-27
Attending: INTERNAL MEDICINE
Payer: COMMERCIAL

## 2018-12-27 VITALS
HEIGHT: 69 IN | HEART RATE: 66 BPM | SYSTOLIC BLOOD PRESSURE: 118 MMHG | OXYGEN SATURATION: 95 % | DIASTOLIC BLOOD PRESSURE: 74 MMHG | BODY MASS INDEX: 25.68 KG/M2 | TEMPERATURE: 98 F | RESPIRATION RATE: 16 BRPM | WEIGHT: 173.4 LBS

## 2018-12-27 DIAGNOSIS — R80.9 ALBUMINURIA: Primary | ICD-10-CM

## 2018-12-27 DIAGNOSIS — R80.9 MICROALBUMINURIA: ICD-10-CM

## 2018-12-27 LAB
ALBUMIN SERPL-MCNC: 3.9 G/DL (ref 3.4–5)
ALBUMIN UR-MCNC: NEGATIVE MG/DL
ANION GAP SERPL CALCULATED.3IONS-SCNC: 4 MMOL/L (ref 3–14)
APPEARANCE UR: CLEAR
BILIRUB UR QL STRIP: NEGATIVE
BUN SERPL-MCNC: 14 MG/DL (ref 7–30)
CALCIUM SERPL-MCNC: 8.9 MG/DL (ref 8.5–10.1)
CHLORIDE SERPL-SCNC: 100 MMOL/L (ref 94–109)
CO2 SERPL-SCNC: 30 MMOL/L (ref 20–32)
COLOR UR AUTO: ABNORMAL
CREAT SERPL-MCNC: 0.64 MG/DL (ref 0.52–1.04)
CREAT UR-MCNC: 23 MG/DL
ERYTHROCYTE [DISTWIDTH] IN BLOOD BY AUTOMATED COUNT: 11.9 % (ref 10–15)
GFR SERPL CREATININE-BSD FRML MDRD: >90 ML/MIN/{1.73_M2}
GLUCOSE SERPL-MCNC: 96 MG/DL (ref 70–99)
GLUCOSE UR STRIP-MCNC: NEGATIVE MG/DL
HCT VFR BLD AUTO: 37.5 % (ref 35–47)
HGB BLD-MCNC: 12.5 G/DL (ref 11.7–15.7)
HGB UR QL STRIP: NEGATIVE
KETONES UR STRIP-MCNC: NEGATIVE MG/DL
LEUKOCYTE ESTERASE UR QL STRIP: NEGATIVE
MCH RBC QN AUTO: 31 PG (ref 26.5–33)
MCHC RBC AUTO-ENTMCNC: 33.3 G/DL (ref 31.5–36.5)
MCV RBC AUTO: 93 FL (ref 78–100)
MICROALBUMIN UR-MCNC: 32 MG/L
MICROALBUMIN/CREAT UR: 138.03 MG/G CR (ref 0–25)
MUCOUS THREADS #/AREA URNS LPF: PRESENT /LPF
NITRATE UR QL: NEGATIVE
PH UR STRIP: 7 PH (ref 5–7)
PHOSPHATE SERPL-MCNC: 4.2 MG/DL (ref 2.5–4.5)
PLATELET # BLD AUTO: 304 10E9/L (ref 150–450)
POTASSIUM SERPL-SCNC: 4.2 MMOL/L (ref 3.4–5.3)
PROT UR-MCNC: 0.09 G/L
PROT/CREAT 24H UR: 0.38 G/G CR (ref 0–0.2)
RBC # BLD AUTO: 4.03 10E12/L (ref 3.8–5.2)
RBC #/AREA URNS AUTO: 1 /HPF (ref 0–2)
SODIUM SERPL-SCNC: 133 MMOL/L (ref 133–144)
SOURCE: ABNORMAL
SP GR UR STRIP: 1.01 (ref 1–1.03)
SQUAMOUS #/AREA URNS AUTO: <1 /HPF (ref 0–1)
UROBILINOGEN UR STRIP-MCNC: 0 MG/DL (ref 0–2)
WBC # BLD AUTO: 6.8 10E9/L (ref 4–11)
WBC #/AREA URNS AUTO: <1 /HPF (ref 0–5)

## 2018-12-27 PROCEDURE — 82043 UR ALBUMIN QUANTITATIVE: CPT | Performed by: INTERNAL MEDICINE

## 2018-12-27 PROCEDURE — G0463 HOSPITAL OUTPT CLINIC VISIT: HCPCS | Mod: ZF

## 2018-12-27 PROCEDURE — 36415 COLL VENOUS BLD VENIPUNCTURE: CPT | Performed by: INTERNAL MEDICINE

## 2018-12-27 PROCEDURE — 80069 RENAL FUNCTION PANEL: CPT | Performed by: INTERNAL MEDICINE

## 2018-12-27 PROCEDURE — 84156 ASSAY OF PROTEIN URINE: CPT | Performed by: INTERNAL MEDICINE

## 2018-12-27 PROCEDURE — 81001 URINALYSIS AUTO W/SCOPE: CPT | Performed by: INTERNAL MEDICINE

## 2018-12-27 PROCEDURE — 85027 COMPLETE CBC AUTOMATED: CPT | Performed by: INTERNAL MEDICINE

## 2018-12-27 ASSESSMENT — PAIN SCALES - GENERAL: PAINLEVEL: NO PAIN (0)

## 2018-12-27 ASSESSMENT — MIFFLIN-ST. JEOR: SCORE: 1455.92

## 2018-12-27 NOTE — PATIENT INSTRUCTIONS
-Please have a kidney ultrasound performed at your convenience  -Please have urine albumin drawn in 6 months  -Return to clinic in 1 year

## 2018-12-27 NOTE — NURSING NOTE
"Chief Complaint   Patient presents with     Consult     albumin in urine       Vital signs:  Temp: 98  F (36.7  C) Temp src: Oral BP: 118/74 Pulse: 66   Resp: 16 SpO2: 95 %     Height: 175.3 cm (5' 9\") Weight: 78.7 kg (173 lb 6.4 oz)  Estimated body mass index is 25.61 kg/m  as calculated from the following:    Height as of this encounter: 1.753 m (5' 9\").    Weight as of this encounter: 78.7 kg (173 lb 6.4 oz).          Tiara Aguirre [facility]  12/27/2018 3:13 PM      "

## 2018-12-27 NOTE — LETTER
2018      RE: Josy John  [address]  [place] [address]         Nephrology Clinic    Sam Freire MD  2018     Name: Josy John  MRN: [id number]  Age: 53 year old  : 1965  Referring provider: Samantha Mcdonald     Assessment and Plan:     CKD, stage 1/albuminuria: She has preserved kidney function with minimal albuminuria.   She has no microscopic hematuria or evidence of active urinary sediment.  I suspect she sustained some renal injury at an early age as a result of recurrent UTI/pyelonephritis form urinary reflux (that has since been corrected with ureteral reimplantation).  As a result, she likely has some mild scarring resulting in minimal albuminuria (138 mg/g today).      - renal ultrasound performed and essentially normal  - continue RAAS blockade for renoprotection/albuminuria with lisinopril at 10 mg daily  - no pressing need to increase lisinopril dose as long as urine albumin remains < 500 mg/g; if needed in the future then would consider stopping low dose amlodipine and increasing lisinopril to 20 mg at bedtime or 10 mg BID due to her concern for developing orthostatic symptoms  - return to clinic in 1 year     Hypertension: Primary. Well controlled.  No changes made today.  -she will continue on amlodipine (2.5 mg) and lisinopril (10 mg) daily      Follow-up: Return in about 1 year (around 2019).     HPI:   Josy John is a 53 year old woman with a history of hypertension (controlled on lisinopril and amlodipine) who was referred for nephrology consult by her PCP due to proteinuria. The patient reports when she was 5 y.o. She had recurrent UTI/pyelonephritis for approximately a year and finally had ureteral implantation to prevent suspected ureteral reflux. Since then, she has not had any significant history of UTI. She states she was diagnosed with hypertension many years ago. She was first started on hydrochlorothiazide but then  switched to amlodipine and lisinopril. She previously was on 20 mg lisinopril once daily, which caused her to have significant muscle cramping, so this was decreased to 10 mg once daily.     Recently, she was seen by her PCP and noted to have proteinuria. Her PCP wanted to increase her lisinopril, though she notes her blood pressures have been stable recently. The patient does express concern for why these proteins are showing up in her urine. She does note occipital headaches when she wakes up in the morning over the last year and wonders if this is related to hormone levels or caffeine intake. She denies any significant pain medication use, though she does use tylenol during arthritis flares. She also denies any history of kidney stones or diabetes.  She has no family history of kidney disease.       Review of Systems:   Pertinent items are noted in HPI or as below, remainder of complete ROS is negative.      Active Medications:      amLODIPine (NORVASC) 2.5 MG tablet, Take 1 tablet (2.5 mg) by mouth daily, Disp: 90 tablet, Rfl: 3     Cholecalciferol (VITAMIN D3 PO), Take 1,000 Units by mouth daily, Disp: , Rfl:      citalopram (CELEXA) 10 MG tablet, Take 1 tablet (10 mg) by mouth daily, Disp: 90 tablet, Rfl: 3     diclofenac (VOLTAREN) 1 % GEL topical gel, Apply 2 g topically 4 times daily as needed for moderate pain, Disp: 1 g, Rfl: 1     lisinopril (PRINIVIL/ZESTRIL) 20 MG tablet, Take 1 tablet (20 mg) by mouth daily (Patient taking differently: Take 10 mg by mouth daily ), Disp: 90 tablet, Rfl: 1     omega 3 1000 MG CAPS, Take 1 g by mouth daily, Disp: 90 capsule, Rfl:      Allergies:   Zoloft      Past Medical History:  Generalized anxiety disorder   Hypertension, controlled  Microalbuminuria  Vitamin D deficiency     Past Surgical History:  Reimplantation of ureters    Family History:   No family history of kidney disease. Family hypertension (mother, brother, and father).      Social History:   The patient  "has never smoked. Denies alcohol use.     Physical Exam:  /74   Pulse 66   Temp 98  F (36.7  C) (Oral)   Resp 16   Ht 1.753 m (5' 9\")   Wt 78.7 kg (173 lb 6.4 oz)   SpO2 95%   BMI 25.61 kg/m      GENERAL APPEARANCE: alert and no distress  EYES: nonicteric  HENT: mouth without ulcers or lesions  NECK: supple, no adenopathy  RESP: lungs clear to auscultation   CV: regular rhythm, normal rate, no rub  ABDOMEN: soft, nontender, nondistended  Extremities: no edema  MS: no evidence of inflammation in joints, no muscle tenderness  SKIN: no  concerning rash  NEURO: mentation intact and speech normal  PSYCH: affect normal/bright     Laboratory:  CMP  Recent Labs   Lab Test 12/27/18  0229 11/27/18  1630 02/27/18  1446 12/20/17  1512  06/20/16  0854  10/09/14  1530  03/14/13  1012 12/08/11  0814    134 134 129*   < > 138   < > 134   < > 138 139   POTASSIUM 4.2 4.2 3.9 3.9   < > 3.9   < > 4.1   < > 4.1 3.8   CHLORIDE 100 98 101 94   < > 104   < > 101   < > 102 101   CO2 30 23 27 27   < > 27   < > 25   < > 26 27   ANIONGAP 4 13 6 8   < > 7   < > 8   < > 10 12   GLC 96 79 87 94   < > 87   < > 91   < > 88 85   BUN 14 15 13 14   < > 9   < > 14   < > 14 14   CR 0.64 0.67 0.64 0.64   < > 0.71   < > 0.74   < > 0.79 0.84   GFRESTIMATED >90 >90 >90 >90   < > 86   < > 83   < > 78 73   GFRESTBLACK >90 >90 >90 >90   < > >90  African American GFR Calc     < > >90   GFR Calc     < > >90 88   KRIS 8.9 9.5 8.4* 9.0   < > 8.8   < > 8.9   < > 9.1 9.1   PHOS 4.2  --   --   --   --   --   --   --   --   --   --    PROTTOTAL  --   --   --   --   --  8.4  --  8.2  --  8.9* 8.6   ALBUMIN 3.9  --   --   --   --  4.0  --  3.8*  --  4.6 4.5   BILITOTAL  --   --   --   --   --  0.4  --  0.2  --  0.5 0.6   ALKPHOS  --   --   --   --   --  68  --  98  --  74 75   AST  --   --   --   --   --  22  --  21  --  34 32   ALT  --   --   --   --   --  24  --  27  --  21 24    < > = values in this interval not displayed. "     CBC  Recent Labs   Lab Test 12/27/18  0229 07/16/18  1545 12/22/17  1521 08/01/17  1531 06/20/16  0854   HGB 12.5 13.0 11.7 12.3 13.6   WBC 6.8 7.4 9.1  --  5.4   RBC 4.03 4.08 3.65*  --  4.26   HCT 37.5 38.4 33.7*  --  39.7   MCV 93 94 92  --  93   MCH 31.0 31.9 32.1  --  31.9   MCHC 33.3 33.9 34.7  --  34.3   RDW 11.9 11.8 12.0  --  11.9    307 327  --  309       URINE STUDIES  Recent Labs   Lab Test 12/27/18  1433 12/22/17  1521   COLOR Straw Yellow   APPEARANCE Clear Clear   URINEGLC Negative Negative   URINEBILI Negative Negative   URINEKETONE Negative 15*   SG 1.008 1.015   UBLD Negative Small*   URINEPH 7.0 6.5   PROTEIN Negative Negative   UROBILINOGEN  --  0.2   NITRITE Negative Negative   LEUKEST Negative Negative   RBCU 1 O - 2   WBCU <1 O - 2     Recent Labs   Lab Test 12/27/18  1433   UTPG 0.38*     PTH  No lab results found.  IRON STUDIES   Recent Labs   Lab Test 06/17/15  1550 10/28/14  1453 10/09/14  1530 12/08/11  0814   IRON 80  --   --  112     --   --  407   IRONSAT 20  --   --  27   YUN 12 18 3*  --        Scribe Disclosure:   I, Estela Cardoza, am serving as a scribe to document services personally performed by Sam Freire MD at this visit, based upon the provider's statements to me. All documentation has been reviewed by the aforementioned provider prior to being entered into the official medical record.     Portions of this medical record were completed by a scribe. UPON MY REVIEW AND AUTHENTICATION BY ELECTRONIC SIGNATURE, this confirms (a) I performed the applicable clinical services, and (b) the record is accurate.    Total time spent was 60 minutes, and more than 50% of face to face time was spent in counseling and/or coordination of care regarding principles of multidisciplinary care, medication management, and chronic kidney disease education.  Sam Freire MD

## 2018-12-27 NOTE — PROGRESS NOTES
Nephrology Clinic    Sam Freire MD  2018     Name: Josy John  MRN: [id number]  Age: 53 year old  : 1965  Referring provider: Samantha Mcdonald     Assessment and Plan:     CKD, stage 1/albuminuria: She has preserved kidney function with minimal albuminuria.   She has no microscopic hematuria or evidence of active urinary sediment.  I suspect she sustained some renal injury at an early age as a result of recurrent UTI/pyelonephritis form urinary reflux (that has since been corrected with ureteral reimplantation).  As a result, she likely has some mild scarring resulting in minimal albuminuria (138 mg/g today).      - renal ultrasound performed and essentially normal  - continue RAAS blockade for renoprotection/albuminuria with lisinopril at 10 mg daily  - no pressing need to increase lisinopril dose as long as urine albumin remains < 500 mg/g; if needed in the future then would consider stopping low dose amlodipine and increasing lisinopril to 20 mg at bedtime or 10 mg BID due to her concern for developing orthostatic symptoms  - return to clinic in 1 year     Hypertension: Primary. Well controlled.  No changes made today.  -she will continue on amlodipine (2.5 mg) and lisinopril (10 mg) daily      Follow-up: Return in about 1 year (around 2019).     HPI:   Josy John is a 53 year old woman with a history of hypertension (controlled on lisinopril and amlodipine) who was referred for nephrology consult by her PCP due to proteinuria. The patient reports when she was 5 y.o. She had recurrent UTI/pyelonephritis for approximately a year and finally had ureteral implantation to prevent suspected ureteral reflux. Since then, she has not had any significant history of UTI. She states she was diagnosed with hypertension many years ago. She was first started on hydrochlorothiazide but then switched to amlodipine and lisinopril. She previously was on 20 mg lisinopril once daily,  which caused her to have significant muscle cramping, so this was decreased to 10 mg once daily.     Recently, she was seen by her PCP and noted to have proteinuria. Her PCP wanted to increase her lisinopril, though she notes her blood pressures have been stable recently. The patient does express concern for why these proteins are showing up in her urine. She does note occipital headaches when she wakes up in the morning over the last year and wonders if this is related to hormone levels or caffeine intake. She denies any significant pain medication use, though she does use tylenol during arthritis flares. She also denies any history of kidney stones or diabetes.  She has no family history of kidney disease.       Review of Systems:   Pertinent items are noted in HPI or as below, remainder of complete ROS is negative.      Active Medications:      amLODIPine (NORVASC) 2.5 MG tablet, Take 1 tablet (2.5 mg) by mouth daily, Disp: 90 tablet, Rfl: 3     Cholecalciferol (VITAMIN D3 PO), Take 1,000 Units by mouth daily, Disp: , Rfl:      citalopram (CELEXA) 10 MG tablet, Take 1 tablet (10 mg) by mouth daily, Disp: 90 tablet, Rfl: 3     diclofenac (VOLTAREN) 1 % GEL topical gel, Apply 2 g topically 4 times daily as needed for moderate pain, Disp: 1 g, Rfl: 1     lisinopril (PRINIVIL/ZESTRIL) 20 MG tablet, Take 1 tablet (20 mg) by mouth daily (Patient taking differently: Take 10 mg by mouth daily ), Disp: 90 tablet, Rfl: 1     omega 3 1000 MG CAPS, Take 1 g by mouth daily, Disp: 90 capsule, Rfl:      Allergies:   Zoloft      Past Medical History:  Generalized anxiety disorder   Hypertension, controlled  Microalbuminuria  Vitamin D deficiency     Past Surgical History:  Reimplantation of ureters    Family History:   No family history of kidney disease. Family hypertension (mother, brother, and father).      Social History:   The patient has never smoked. Denies alcohol use.     Physical Exam:  /74   Pulse 66   Temp 98  " F (36.7  C) (Oral)   Resp 16   Ht 1.753 m (5' 9\")   Wt 78.7 kg (173 lb 6.4 oz)   SpO2 95%   BMI 25.61 kg/m     GENERAL APPEARANCE: alert and no distress  EYES: nonicteric  HENT: mouth without ulcers or lesions  NECK: supple, no adenopathy  RESP: lungs clear to auscultation   CV: regular rhythm, normal rate, no rub  ABDOMEN: soft, nontender, nondistended  Extremities: no edema  MS: no evidence of inflammation in joints, no muscle tenderness  SKIN: no concerning rash  NEURO: mentation intact and speech normal  PSYCH: affect normal/bright     Laboratory:  CMP  Recent Labs   Lab Test 12/27/18  0229 11/27/18  1630 02/27/18  1446 12/20/17  1512  06/20/16  0854  10/09/14  1530  03/14/13  1012 12/08/11  0814    134 134 129*   < > 138   < > 134   < > 138 139   POTASSIUM 4.2 4.2 3.9 3.9   < > 3.9   < > 4.1   < > 4.1 3.8   CHLORIDE 100 98 101 94   < > 104   < > 101   < > 102 101   CO2 30 23 27 27   < > 27   < > 25   < > 26 27   ANIONGAP 4 13 6 8   < > 7   < > 8   < > 10 12   GLC 96 79 87 94   < > 87   < > 91   < > 88 85   BUN 14 15 13 14   < > 9   < > 14   < > 14 14   CR 0.64 0.67 0.64 0.64   < > 0.71   < > 0.74   < > 0.79 0.84   GFRESTIMATED >90 >90 >90 >90   < > 86   < > 83   < > 78 73   GFRESTBLACK >90 >90 >90 >90   < > >90  African American GFR Calc     < > >90   GFR Calc     < > >90 88   KRIS 8.9 9.5 8.4* 9.0   < > 8.8   < > 8.9   < > 9.1 9.1   PHOS 4.2  --   --   --   --   --   --   --   --   --   --    PROTTOTAL  --   --   --   --   --  8.4  --  8.2  --  8.9* 8.6   ALBUMIN 3.9  --   --   --   --  4.0  --  3.8*  --  4.6 4.5   BILITOTAL  --   --   --   --   --  0.4  --  0.2  --  0.5 0.6   ALKPHOS  --   --   --   --   --  68  --  98  --  74 75   AST  --   --   --   --   --  22  --  21  --  34 32   ALT  --   --   --   --   --  24  --  27  --  21 24    < > = values in this interval not displayed.     CBC  Recent Labs   Lab Test 12/27/18  0229 07/16/18  1545 12/22/17  1521 08/01/17  1531 " 06/20/16  0854   HGB 12.5 13.0 11.7 12.3 13.6   WBC 6.8 7.4 9.1  --  5.4   RBC 4.03 4.08 3.65*  --  4.26   HCT 37.5 38.4 33.7*  --  39.7   MCV 93 94 92  --  93   MCH 31.0 31.9 32.1  --  31.9   MCHC 33.3 33.9 34.7  --  34.3   RDW 11.9 11.8 12.0  --  11.9    307 327  --  309       URINE STUDIES  Recent Labs   Lab Test 12/27/18  1433 12/22/17  1521   COLOR Straw Yellow   APPEARANCE Clear Clear   URINEGLC Negative Negative   URINEBILI Negative Negative   URINEKETONE Negative 15*   SG 1.008 1.015   UBLD Negative Small*   URINEPH 7.0 6.5   PROTEIN Negative Negative   UROBILINOGEN  --  0.2   NITRITE Negative Negative   LEUKEST Negative Negative   RBCU 1 O - 2   WBCU <1 O - 2     Recent Labs   Lab Test 12/27/18  1433   UTPG 0.38*     PTH  No lab results found.  IRON STUDIES   Recent Labs   Lab Test 06/17/15  1550 10/28/14  1453 10/09/14  1530 12/08/11  0814   IRON 80  --   --  112     --   --  407   IRONSAT 20  --   --  27   YUN 12 18 3*  --        Scribe Disclosure:   I, Estela Cardoza, am serving as a scribe to document services personally performed by Sam Freire MD at this visit, based upon the provider's statements to me. All documentation has been reviewed by the aforementioned provider prior to being entered into the official medical record.     Portions of this medical record were completed by a scribe. UPON MY REVIEW AND AUTHENTICATION BY ELECTRONIC SIGNATURE, this confirms (a) I performed the applicable clinical services, and (b) the record is accurate.    Total time spent was 60 minutes, and more than 50% of face to face time was spent in counseling and/or coordination of care regarding principles of multidisciplinary care, medication management, and chronic kidney disease education.  Sam Freire MD

## 2018-12-27 NOTE — LETTER
2018       RE: Josy John  [address]  [place] [address]     Dear Colleague,    Thank you for referring your patient, Josy John, to the [facility] NEPHROLOGY at [facility]. Please see a copy of my visit note below.      Nephrology Clinic    Sam Freire MD  2018     Name: Josy John  MRN: [id number]  Age: 53 year old  : 1965  Referring provider: Samantha Mcdonald     Assessment and Plan:     CKD, stage 1/albuminuria: She has preserved kidney function with minimal albuminuria.   She has no microscopic hematuria or evidence of active urinary sediment.  I suspect she sustained some renal injury at an early age as a result of recurrent UTI/pyelonephritis form urinary reflux (that has since been corrected with ureteral reimplantation).  As a result, she likely has some mild scarring resulting in minimal albuminuria (138 mg/g today).      - renal ultrasound performed and essentially normal  - continue RAAS blockade for renoprotection/albuminuria with lisinopril at 10 mg daily  - no pressing need to increase lisinopril dose as long as urine albumin remains < 500 mg/g; if needed in the future then would consider stopping low dose amlodipine and increasing lisinopril to 20 mg at bedtime or 10 mg BID due to her concern for developing orthostatic symptoms  - return to clinic in 1 year     Hypertension: Primary. Well controlled.  No changes made today.  -she will continue on amlodipine (2.5 mg) and lisinopril (10 mg) daily      Follow-up: Return in about 1 year (around 2019).     HPI:   Josy John is a 53 year old woman with a history of hypertension (controlled on lisinopril and amlodipine) who was referred for nephrology consult by her PCP due to proteinuria. The patient reports when she was 5 y.o. She had recurrent UTI/pyelonephritis for approximately a year and finally had ureteral implantation to prevent suspected  ureteral reflux. Since then, she has not had any significant history of UTI. She states she was diagnosed with hypertension many years ago. She was first started on hydrochlorothiazide but then switched to amlodipine and lisinopril. She previously was on 20 mg lisinopril once daily, which caused her to have significant muscle cramping, so this was decreased to 10 mg once daily.     Recently, she was seen by her PCP and noted to have proteinuria. Her PCP wanted to increase her lisinopril, though she notes her blood pressures have been stable recently. The patient does express concern for why these proteins are showing up in her urine. She does note occipital headaches when she wakes up in the morning over the last year and wonders if this is related to hormone levels or caffeine intake. She denies any significant pain medication use, though she does use tylenol during arthritis flares. She also denies any history of kidney stones or diabetes.  She has no family history of kidney disease.       Review of Systems:   Pertinent items are noted in HPI or as below, remainder of complete ROS is negative.      Active Medications:      amLODIPine (NORVASC) 2.5 MG tablet, Take 1 tablet (2.5 mg) by mouth daily, Disp: 90 tablet, Rfl: 3     Cholecalciferol (VITAMIN D3 PO), Take 1,000 Units by mouth daily, Disp: , Rfl:      citalopram (CELEXA) 10 MG tablet, Take 1 tablet (10 mg) by mouth daily, Disp: 90 tablet, Rfl: 3     diclofenac (VOLTAREN) 1 % GEL topical gel, Apply 2 g topically 4 times daily as needed for moderate pain, Disp: 1 g, Rfl: 1     lisinopril (PRINIVIL/ZESTRIL) 20 MG tablet, Take 1 tablet (20 mg) by mouth daily (Patient taking differently: Take 10 mg by mouth daily ), Disp: 90 tablet, Rfl: 1     omega 3 1000 MG CAPS, Take 1 g by mouth daily, Disp: 90 capsule, Rfl:      Allergies:   Zoloft      Past Medical History:  Generalized anxiety disorder   Hypertension, controlled  Microalbuminuria  Vitamin D deficiency  "    Past Surgical History:  Reimplantation of ureters    Family History:   No family history of kidney disease. Family hypertension (mother, brother, and father).      Social History:   The patient has never smoked. Denies alcohol use.     Physical Exam:  /74   Pulse 66   Temp 98  F (36.7  C) (Oral)   Resp 16   Ht 1.753 m (5' 9\")   Wt 78.7 kg (173 lb 6.4 oz)   SpO2 95%   BMI 25.61 kg/m      GENERAL APPEARANCE: alert and no distress  EYES: nonicteric  HENT: mouth without ulcers or lesions  NECK: supple, no adenopathy  RESP: lungs clear to auscultation   CV: regular rhythm, normal rate, no rub  ABDOMEN: soft, nontender, nondistended  Extremities: no edema  MS: no evidence of inflammation in joints, no muscle tenderness  SKIN: no  concerning rash  NEURO: mentation intact and speech normal  PSYCH: affect normal/bright     Laboratory:  CMP  Recent Labs   Lab Test 12/27/18  0229 11/27/18  1630 02/27/18  1446 12/20/17  1512  06/20/16  0854  10/09/14  1530  03/14/13  1012 12/08/11  0814    134 134 129*   < > 138   < > 134   < > 138 139   POTASSIUM 4.2 4.2 3.9 3.9   < > 3.9   < > 4.1   < > 4.1 3.8   CHLORIDE 100 98 101 94   < > 104   < > 101   < > 102 101   CO2 30 23 27 27   < > 27   < > 25   < > 26 27   ANIONGAP 4 13 6 8   < > 7   < > 8   < > 10 12   GLC 96 79 87 94   < > 87   < > 91   < > 88 85   BUN 14 15 13 14   < > 9   < > 14   < > 14 14   CR 0.64 0.67 0.64 0.64   < > 0.71   < > 0.74   < > 0.79 0.84   GFRESTIMATED >90 >90 >90 >90   < > 86   < > 83   < > 78 73   GFRESTBLACK >90 >90 >90 >90   < > >90  African American GFR Calc     < > >90   GFR Calc     < > >90 88   KRIS 8.9 9.5 8.4* 9.0   < > 8.8   < > 8.9   < > 9.1 9.1   PHOS 4.2  --   --   --   --   --   --   --   --   --   --    PROTTOTAL  --   --   --   --   --  8.4  --  8.2  --  8.9* 8.6   ALBUMIN 3.9  --   --   --   --  4.0  --  3.8*  --  4.6 4.5   BILITOTAL  --   --   --   --   --  0.4  --  0.2  --  0.5 0.6   ALKPHOS  --   --   -- "   --   --  68  --  98  --  74 75   AST  --   --   --   --   --  22  --  21  --  34 32   ALT  --   --   --   --   --  24  --  27  --  21 24    < > = values in this interval not displayed.     CBC  Recent Labs   Lab Test 12/27/18  0229 07/16/18  1545 12/22/17  1521 08/01/17  1531 06/20/16  0854   HGB 12.5 13.0 11.7 12.3 13.6   WBC 6.8 7.4 9.1  --  5.4   RBC 4.03 4.08 3.65*  --  4.26   HCT 37.5 38.4 33.7*  --  39.7   MCV 93 94 92  --  93   MCH 31.0 31.9 32.1  --  31.9   MCHC 33.3 33.9 34.7  --  34.3   RDW 11.9 11.8 12.0  --  11.9    307 327  --  309       URINE STUDIES  Recent Labs   Lab Test 12/27/18  1433 12/22/17  1521   COLOR Straw Yellow   APPEARANCE Clear Clear   URINEGLC Negative Negative   URINEBILI Negative Negative   URINEKETONE Negative 15*   SG 1.008 1.015   UBLD Negative Small*   URINEPH 7.0 6.5   PROTEIN Negative Negative   UROBILINOGEN  --  0.2   NITRITE Negative Negative   LEUKEST Negative Negative   RBCU 1 O - 2   WBCU <1 O - 2     Recent Labs   Lab Test 12/27/18  1433   UTPG 0.38*     PTH  No lab results found.  IRON STUDIES   Recent Labs   Lab Test 06/17/15  1550 10/28/14  1453 10/09/14  1530 12/08/11  0814   IRON 80  --   --  112     --   --  407   IRONSAT 20  --   --  27   YUN 12 18 3*  --        Scribe Disclosure:   I, Estela Cardoza, am serving as a scribe to document services personally performed by Sam Freire MD at this visit, based upon the provider's statements to me. All documentation has been reviewed by the aforementioned provider prior to being entered into the official medical record.     Portions of this medical record were completed by a scribe. UPON MY REVIEW AND AUTHENTICATION BY ELECTRONIC SIGNATURE, this confirms (a) I performed the applicable clinical services, and (b) the record is accurate.    Total time spent was 60 minutes, and more than 50% of face to face time was spent in counseling and/or coordination of care regarding principles of  multidisciplinary care, medication management, and chronic kidney disease education.  Sam Freire MD

## 2018-12-28 ENCOUNTER — HOSPITAL ENCOUNTER (OUTPATIENT)
Dept: ULTRASOUND IMAGING | Facility: CLINIC | Age: 53
Discharge: HOME | End: 2018-12-28
Attending: INTERNAL MEDICINE | Admitting: INTERNAL MEDICINE
Payer: COMMERCIAL

## 2018-12-28 DIAGNOSIS — R80.9 ALBUMINURIA: ICD-10-CM

## 2018-12-28 PROCEDURE — 76770 US EXAM ABDO BACK WALL COMP: CPT

## 2019-04-15 ENCOUNTER — TELEPHONE (OUTPATIENT)
Dept: PEDIATRICS | Facility: CLINIC | Age: 54
End: 2019-04-15

## 2019-04-15 ENCOUNTER — TRANSFERRED RECORDS (OUTPATIENT)
Dept: HEALTH INFORMATION MANAGEMENT | Facility: CLINIC | Age: 54
End: 2019-04-15

## 2019-04-15 NOTE — TELEPHONE ENCOUNTER
Patient was out on a walk this am, and fell on the ice.  Injury just happened a little bit ago.  Landed on her arm, striking the outside edge of her right hand and arm  Pain is just above the wrist and radiates up her arm.  No swelling.  No deformity noted.  No laceration.  Advised evaluation and patient is in agreement.  She will either come to [facility] or be seen by her Orthopedist at [facility].  No further questions.  Will call back if any other questions or concerns.  SCAR Bruce RN

## 2019-04-18 ENCOUNTER — TRANSFERRED RECORDS (OUTPATIENT)
Dept: HEALTH INFORMATION MANAGEMENT | Facility: CLINIC | Age: 54
End: 2019-04-18

## 2019-05-21 ENCOUNTER — TRANSFERRED RECORDS (OUTPATIENT)
Dept: HEALTH INFORMATION MANAGEMENT | Facility: CLINIC | Age: 54
End: 2019-05-21

## 2019-06-06 ENCOUNTER — THERAPY VISIT (OUTPATIENT)
Dept: OCCUPATIONAL THERAPY | Facility: CLINIC | Age: 54
End: 2019-06-06
Payer: COMMERCIAL

## 2019-06-06 DIAGNOSIS — S52.509A FRACTURE OF DISTAL END OF RADIUS: Primary | ICD-10-CM

## 2019-06-06 PROCEDURE — G8984 CARRY CURRENT STATUS: HCPCS | Mod: GO | Performed by: OCCUPATIONAL THERAPIST

## 2019-06-06 PROCEDURE — G8985 CARRY GOAL STATUS: HCPCS | Mod: GO | Performed by: OCCUPATIONAL THERAPIST

## 2019-06-06 PROCEDURE — 97166 OT EVAL MOD COMPLEX 45 MIN: CPT | Mod: GO | Performed by: OCCUPATIONAL THERAPIST

## 2019-06-06 PROCEDURE — 97110 THERAPEUTIC EXERCISES: CPT | Mod: GO | Performed by: OCCUPATIONAL THERAPIST

## 2019-06-06 PROCEDURE — G8986 CARRY D/C STATUS: HCPCS | Mod: GO | Performed by: OCCUPATIONAL THERAPIST

## 2019-06-06 NOTE — PROGRESS NOTES
Hand Therapy Initial Evaluation    Current Date:  6/6/2019    Diagnosis: Right radial styloid fracture  DOI: April 15, 2019   DOS: Closed   Procedure:  Casted switched to splint May 22nd  Post:  7 weeks 4 days    Precautions: healing fracture    Subjective:  Josy John is a 54 year old Right hand dominant female.    Patient reports symptoms of pain, stiffness/loss of motion and weakness/loss of strength of the right wrist which occurred due to fall. Since onset symptoms are Gradually getting better.  Special tests:  x-ray.  Previous treatment: cast then splint.    General health as reported by patient is good.  Pertinent medical history includes:High Blood Pressure  Medical allergies:none.  Surgical history: none.  Medication history: None.    Occupational Profile Information:  Current occupation is Registration and admissions  Currently working in normal job without restrictions  Job Tasks: Computer Work, currently not doing tasks that involve vibration  Prior functional level:  no limitations  Barriers include:none  Mobility: No difficulty  Transportation: drives  Leisure activities/hobbies: gardening, volleyball,   O    Functional Outcome Measure:   56     Objective:  Pain Level (Scale 0-10):   6/6/2019   At Rest 0/10   With Use 5/10     Pain Description:  Date 6/6/2019   Location wrist   Pain Quality Dull   Frequency intermittent     Pain is worst  daytime   Exacerbated by  forceful use   Relieved by rest   Progression Gradually improving     Edema:  NONE  Sensation:  WNL throughout all nerve distributions; per patient report    ROM:  Supintation R 90 L 90  Pronation  R 85 Pulls  L 8 5  Wrist Flexion  R 35 L 65  Wrist extension  R 50 L 65  UD R 15 L 25  RD R 10 L 20     Strength:Not tested    Assessment:  Patient presents with symptoms consistent with diagnosis of  Radial styloid fracture,  with conservative intervention.     Patient's limitations or Problem List includes:  Pain, Decreased ROM/motion and  Weakness of the right wrist which interferes with the patient's ability to perform Household Chores as compared to previous level of function.    Rehab Potential:  Excellent - Return to full activity, no limitations    Patient will benefit from skilled Occupational Therapy to increase ROM, flexibility,  strength and stability of wrist to return to previous activity level and resume normal daily tasks and to reach their rehab potential.    Barriers to Learning:  No barrier    Communication Issues:  Patient appears to be able to clearly communicate and understand verbal and written communication and follow directions correctly.    Chart Review: Chart Review    Identified Performance Deficits: bathing/showering, home establishment and management and meal preparation and cleanup    Assessment of Occupational Performance:  3-5 Performance Deficits    Clinical Decision Making (Complexity): Moderate complexity    Treatment Explanation:  The following has been discussed with the patient:  RX ordered/plan of care  Anticipated outcomes  Possible risks and side effects    Plan:  Frequency:  1 X week, once daily  Duration:  for 3 months    Treatment Plan:   Therapeutic Exercise:  AROM, AAROM, Isometrics and Stabilization  Neuromuscular re-education:  Isometrics and Stabilization  Manual Techniques:  Joint mobilization  Self Care:  Self Care Tasks, Ergonomic Considerations and Work Tasks  Discharge Plan:  Achieve all LTG.  Independent in home treatment program.  Reach maximal therapeutic benefit.    Home Exercise Program:  Modified brace   Heat prior PRN  AAROM WRist extenstion then AROM  AAROM wrist flexion then AROM  Wrist flexion Traction   Isometric    Place on PTRX next visit (therapist did not have access)   Isometric wrist strengthening     Next Visit:  Place home program on PTRX  Progress to Wrist strengthening and stability program next visit  Assess muscle tightness ( consider myofascial release or US)

## 2019-06-12 ENCOUNTER — THERAPY VISIT (OUTPATIENT)
Dept: OCCUPATIONAL THERAPY | Facility: CLINIC | Age: 54
End: 2019-06-12
Payer: COMMERCIAL

## 2019-06-12 DIAGNOSIS — M25.531 RIGHT WRIST PAIN: Primary | ICD-10-CM

## 2019-06-12 PROCEDURE — 97140 MANUAL THERAPY 1/> REGIONS: CPT | Mod: GO | Performed by: OCCUPATIONAL THERAPIST

## 2019-06-12 PROCEDURE — 97110 THERAPEUTIC EXERCISES: CPT | Mod: GO | Performed by: OCCUPATIONAL THERAPIST

## 2019-06-12 NOTE — PROGRESS NOTES
SOAP note objective information for 6/12/2019.    Please refer to the daily flowsheet for treatment today, total treatment time and time spent performing 1:1 timed codes.        ROM  Pain Report:  + mild    ++ moderate    +++ severe   Wrist 6/12/2019 6/12/2019   AROM (PROM) R L   Extension 55 55   Flexion Pre: 25  Post: 40 55   RD 10 15   UD 15 20   Supination 70 75   Pronation NT NT

## 2019-06-18 ENCOUNTER — THERAPY VISIT (OUTPATIENT)
Dept: OCCUPATIONAL THERAPY | Facility: CLINIC | Age: 54
End: 2019-06-18
Payer: COMMERCIAL

## 2019-06-18 DIAGNOSIS — M25.531 RIGHT WRIST PAIN: ICD-10-CM

## 2019-06-18 PROCEDURE — 97140 MANUAL THERAPY 1/> REGIONS: CPT | Mod: GO | Performed by: OCCUPATIONAL THERAPIST

## 2019-06-18 PROCEDURE — 97110 THERAPEUTIC EXERCISES: CPT | Mod: GO | Performed by: OCCUPATIONAL THERAPIST

## 2019-06-20 ENCOUNTER — TRANSFERRED RECORDS (OUTPATIENT)
Dept: HEALTH INFORMATION MANAGEMENT | Facility: CLINIC | Age: 54
End: 2019-06-20

## 2019-06-26 ENCOUNTER — THERAPY VISIT (OUTPATIENT)
Dept: OCCUPATIONAL THERAPY | Facility: CLINIC | Age: 54
End: 2019-06-26
Payer: COMMERCIAL

## 2019-06-26 DIAGNOSIS — M25.531 RIGHT WRIST PAIN: Primary | ICD-10-CM

## 2019-06-26 PROCEDURE — 97110 THERAPEUTIC EXERCISES: CPT | Mod: GO | Performed by: OCCUPATIONAL THERAPIST

## 2019-06-26 NOTE — PROGRESS NOTES
Discharge Note - Hand Therapy    Current Date:  6/26/2019    Initial Evaluation Date:  6/6/19  Reporting period is from 6/6/19 to 6/26/2019  Number of Visits:  4      Diagnosis: Right radial styloid fracture  DOI: April 15, 2019   DOS: Closed   Procedure:  Casted switched to splint May 22nd      Subjective:   Subjective changes as noted by patient:  Pt stated she feels better since removing the brace. No more stiffness in forearm. Pt stated she is able to complete all daily tasks and feels comfortable being discharged from hand therapy.   Functional changes noted by patient:  Improvement in Household Chores  Patient has noted adverse reaction to:  None        Objective:  See Objective measurements in Physical Exam      Objective:  Pain Level (Scale 0-10):    6/6/2019 6/26/19   At Rest 0/10 0/10   With Use 5/10 0/10       ROM  Pain Report:  + mild    ++ moderate    +++ severe   Wrist 6/12/2019 6/12/2019 6/26/19   AROM (PROM) R L R   Extension 55 55 60   Flexion Pre: 25  Post: 40 55 45   RD 10 15 15   UD 15 20 20   Supination 70 75 85   Pronation NT NT WNL     Strength   (Measured in pounds)  Pain Report:  + mild    ++ moderate    +++ severe    6/26/2019 6/26/2019   Trials L R   1  2  3 80 60  55  55   Average  57     Lat Pinch 6/26/2019 6/26/2019   Trials L R   1  2  3 17 15  15  15   Average  15     3 Pt Pinch 6/26/2019 6/26/2019   Trials L R   1   16 14            Assessment:  Response to therapy has been improvement to:  ROM of Wrist:  All Planes  Appropriateness of Rx I have re-evaluated this patient and find that the nature, scope, duration and intensity of the therapy is appropriate for the medical condition of the patient.  Overall Assessment:  Patient's symptoms are resolving.  Patient is independent in home exercise program.  Patient is ready to be discharged from therapy and continue their home treatment program.  STG/LTG:  See goal sheet for details and updates.    Plan:  Frequency/Duration:  Discharge  from Hand Therapy; continue home program.    Recommendations for Home Program :  Wrist and FA isotonics   strengthening

## 2019-08-15 ENCOUNTER — ANCILLARY PROCEDURE (OUTPATIENT)
Dept: MAMMOGRAPHY | Facility: CLINIC | Age: 54
End: 2019-08-15
Attending: INTERNAL MEDICINE
Payer: COMMERCIAL

## 2019-08-15 DIAGNOSIS — Z12.31 VISIT FOR SCREENING MAMMOGRAM: ICD-10-CM

## 2019-08-15 PROCEDURE — 77067 SCR MAMMO BI INCL CAD: CPT | Mod: TC

## 2019-08-15 PROCEDURE — 77063 BREAST TOMOSYNTHESIS BI: CPT | Mod: TC

## 2019-09-04 PROBLEM — M25.531 RIGHT WRIST PAIN: Status: RESOLVED | Noted: 2019-06-12 | Resolved: 2019-09-04

## 2019-10-02 ENCOUNTER — HEALTH MAINTENANCE LETTER (OUTPATIENT)
Age: 54
End: 2019-10-02

## 2019-10-19 ENCOUNTER — TELEPHONE (OUTPATIENT)
Dept: PEDIATRICS | Facility: CLINIC | Age: 54
End: 2019-10-19

## 2019-10-19 NOTE — TELEPHONE ENCOUNTER
Reason for call:  Other   Patient called regarding (reason for call): appointment  Additional comments: patient will like to be seen sooner for her blood pressure check. Since her provider is leaving she will like to follow up with  . She is available for an appointment after 2:30pm    Phone number to reach patient:  Home number on file [phone] (home)    Best Time:  Anytime     Can we leave a detailed message on this number?  YES

## 2019-10-21 NOTE — TELEPHONE ENCOUNTER
Patient stated that she was told that she maybe able to get in sooner.     She would like to see a female provider either on Oct. 30th or before 11/26/19 after 2:30pm .    She would like to talk about Blood pressure, stress, anxiety, headaches and menopause symptoms.     Patient at times has headaches. On Saturday her BP was 160/96. She has not taken it since. Has a family history of HTN.     She is feeling more anxious lately due to changes in her daughters life, her and her husbands relationship (seeing a counselor at this time)    Has been more anxious lately.      Was taking Lisinopril 10mg daily but went back up to 20mg 2 weeks ago because she felt this would help decrease her BP.     She has increased her Celexa to 20mg 2 weeks ago. She does not feel any different.     She is not interested in an evisit.    Drinks caffeine and then may throw up.     Headaches feel like migraines.     Patient able to get into the [facility] with a female provider on 10/30/19 at 3pm.     Advised if becomes worse to go to Urgent Care/ER.     Pt expressed understanding and acceptance of the plan.  Pt had no further questions at this time.  Advised can call back to clinic at any time with concerns.     Freda Cintron RN Flex

## 2019-10-21 NOTE — TELEPHONE ENCOUNTER
The pt called back and I offered her sooner appointments with residents and she declined. She states she would like to speak to her care team about her bp. No further information given. Please call the pt back after 2pm today. Thank you.   Ivett Gordon on 10/21/2019 at 12:27 PM

## 2019-10-24 DIAGNOSIS — R80.9 MICROALBUMINURIA: ICD-10-CM

## 2019-10-24 DIAGNOSIS — I10 BENIGN HYPERTENSION: ICD-10-CM

## 2019-10-24 RX ORDER — LISINOPRIL 20 MG/1
TABLET ORAL
Qty: 90 TABLET | Refills: 0 | Status: SHIPPED | OUTPATIENT
Start: 2019-10-24 | End: 2019-12-30

## 2019-10-24 NOTE — TELEPHONE ENCOUNTER
Prescription approved per [facility] Refill Protocol.  Devang Rubio, RN, BSN      Next 5 appointments (look out 90 days)    Oct 30, 2019  3:00 PM CDT  Office Visit with CIARAN Medina CNP  [facility] [id number] [place] [address], [address] [address] [phone]

## 2019-10-24 NOTE — TELEPHONE ENCOUNTER
"Requested Prescriptions   Pending Prescriptions Disp Refills     lisinopril (PRINIVIL/ZESTRIL) 20 MG tablet [Pharmacy Med Name: LISINOPRIL 20MG TABS]    Last Written Prescription Date:  12/4/2018  Last Fill Quantity: 90,  # refills: 1   Last office visit: 11/27/2018 with prescribing provider:  Samantha Mcdonald     Future Office Visit:   Next 5 appointments (look out 90 days)    Oct 30, 2019  3:00 PM CDT  Office Visit with CIARAN Medina CNP  [facility] [address] [place] [address], [facility] [address]  [facility] [address] [phone]          90 tablet 1     Sig: TAKE ONE TABLET BY MOUTH ONCE DAILY       ACE Inhibitors (Including Combos) Protocol Passed - 10/24/2019  2:57 PM        Passed - Blood pressure under 140/90 in past 12 months     BP Readings from Last 3 Encounters:   12/27/18 118/74   11/27/18 118/70   08/31/18 106/74                 Passed - Recent (12 mo) or future (30 days) visit within the authorizing provider's specialty     Patient has had an office visit with the authorizing provider or a provider within the authorizing providers department within the previous 12 mos or has a future within next 30 days. See \"Patient Info\" tab in inbasket, or \"Choose Columns\" in Meds & Orders section of the refill encounter.              Passed - Medication is active on med list        Passed - Patient is age 18 or older        Passed - No active pregnancy on record        Passed - Normal serum creatinine on file in past 12 months     Recent Labs   Lab Test 12/27/18  0229   CR 0.64             Passed - Normal serum potassium on file in past 12 months     Recent Labs   Lab Test 12/27/18  0229   POTASSIUM 4.2             Passed - No positive pregnancy test within past 12 months          "

## 2019-10-30 ENCOUNTER — OFFICE VISIT (OUTPATIENT)
Dept: FAMILY MEDICINE | Facility: CLINIC | Age: 54
End: 2019-10-30
Payer: COMMERCIAL

## 2019-10-30 VITALS
WEIGHT: 174.3 LBS | DIASTOLIC BLOOD PRESSURE: 90 MMHG | HEIGHT: 69 IN | RESPIRATION RATE: 16 BRPM | SYSTOLIC BLOOD PRESSURE: 148 MMHG | BODY MASS INDEX: 25.82 KG/M2 | OXYGEN SATURATION: 97 % | HEART RATE: 64 BPM

## 2019-10-30 DIAGNOSIS — L03.031 PARONYCHIA, TOE, RIGHT: Primary | ICD-10-CM

## 2019-10-30 DIAGNOSIS — I10 BENIGN HYPERTENSION: ICD-10-CM

## 2019-10-30 PROCEDURE — 99203 OFFICE O/P NEW LOW 30 MIN: CPT | Performed by: NURSE PRACTITIONER

## 2019-10-30 RX ORDER — CEPHALEXIN 500 MG/1
500 CAPSULE ORAL 3 TIMES DAILY
Qty: 21 CAPSULE | Refills: 0 | Status: SHIPPED | OUTPATIENT
Start: 2019-10-30 | End: 2019-12-13

## 2019-10-30 ASSESSMENT — MIFFLIN-ST. JEOR: SCORE: 1455

## 2019-10-30 NOTE — PROGRESS NOTES
Subjective     Josy John is a 54 year old female who presents to clinic today for the following health issues:    HPI   Hypertension Follow-up      Do you check your blood pressure regularly outside of the clinic? No     Are you following a low salt diet? Yes    Are your blood pressures ever more than 140 on the top number (systolic) OR more   than 90 on the bottom number (diastolic), for example 140/90? Yes      How many servings of fruits and vegetables do you eat daily?  4 or more    On average, how many sweetened beverages do you drink each day (soda, juice, sweet tea, etc)?   0    How many days per week do you miss taking your medication? 0    She has been checking her BP at home.  Has seen numbers as high as 160/90-100s.  The past few weeks she has been seeing high readings.  She has been having more headaches recently especially in the morning.  Usually in the summer her BP runs lower due to less job stress.  She had been taking 10 mg of lisinopril for a long time (breaking 20 mg tablet in half).  For the last 2 week she has been taking a full 20 mg tablet of lisinopril.  She denies CP, SOB, edema, palpitations, orthopnea.      BP Readings from Last 6 Encounters:   10/30/19 (!) 150/96   12/27/18 118/74   11/27/18 118/70   08/31/18 106/74   07/16/18 130/88   01/17/18 133/87      Her R great toe has been sore and red.  She was at the nail salon before it started.  She has tried to use nail tools around the skin without any improvement.        Current Outpatient Medications   Medication Sig Dispense Refill     amLODIPine (NORVASC) 2.5 MG tablet Take 1 tablet (2.5 mg) by mouth daily 90 tablet 3     Cholecalciferol (VITAMIN D3 PO) Take 1,000 Units by mouth daily       citalopram (CELEXA) 10 MG tablet Take 1 tablet (10 mg) by mouth daily 90 tablet 3     lisinopril (PRINIVIL/ZESTRIL) 20 MG tablet TAKE ONE TABLET BY MOUTH ONCE DAILY 90 tablet 0     omega 3 1000 MG CAPS Take 1 g by mouth daily 90 capsule   "    Allergies   Allergen Reactions     Zoloft      extreme fatigue and nausea         Reviewed and updated as needed this visit by Provider         Review of Systems   ROS COMP: Constitutional, HEENT, cardiovascular, pulmonary, gi and gu systems are negative, except as otherwise noted.      Objective    BP (!) 150/96 (BP Location: Right arm, Patient Position: Chair, Cuff Size: Adult Regular)   Pulse 64   Resp 16   Ht 1.753 m (5' 9\")   Wt 79.1 kg (174 lb 4.8 oz)   SpO2 97%   Breastfeeding? No   BMI 25.74 kg/m    Body mass index is 25.74 kg/m .  Physical Exam   GENERAL: healthy, alert and no distress  NECK: no adenopathy, no asymmetry, masses, or scars and thyroid normal to palpation  RESP: lungs clear to auscultation - no rales, rhonchi or wheezes  CV: regular rate and rhythm, normal S1 S2, no S3 or S4, no murmur, click or rub, no peripheral edema and peripheral pulses strong  MS: no gross musculoskeletal defects noted, R great toe: swelling, redness and tenderness along the lateral nail fold, no drianage  SKIN: no suspicious lesions or rashes    Diagnostic Test Results:  Labs reviewed in Epic        Assessment & Plan     1. Paronychia, toe, right  Soak in epsom salts.    - cephALEXin (KEFLEX) 500 MG capsule; Take 1 capsule (500 mg) by mouth 3 times daily for 7 days  Dispense: 21 capsule; Refill: 0    2. Benign hypertension  Elevated today though did improve slightly on recheck at end of appt.  She has been having headaches and certainly has been having elevated BP readings at home.  Will have her start taking 1.5 tabs of lisinopril.  She just picked up a refill and does not need this refilled today.  Continue to monitor BP at home.           Return in about 2 months (around 12/30/2019) for blood pressure .    CIARAN Zeng [facility]        "

## 2019-11-17 DIAGNOSIS — F41.1 GENERALIZED ANXIETY DISORDER: ICD-10-CM

## 2019-11-18 RX ORDER — CITALOPRAM HYDROBROMIDE 10 MG/1
TABLET ORAL
Qty: 90 TABLET | Refills: 0 | Status: SHIPPED | OUTPATIENT
Start: 2019-11-18 | End: 2020-03-15

## 2019-11-18 NOTE — TELEPHONE ENCOUNTER
TC: please call: Due for a physical.    Medication is being filled for 1 time refill only due to:  Patient needs to be seen because it has been more than one year since last visit.   Marian Valladares RN  Message handled by Nurse Triage.

## 2019-11-18 NOTE — TELEPHONE ENCOUNTER
"Requested Prescriptions   Pending Prescriptions Disp Refills     citalopram (CELEXA) 10 MG tablet [Pharmacy Med Name: CITALOPRAM HYDROBROMIDE 10MG TABS]  Last Written Prescription Date:  11/27/2018  Last Fill Quantity: 90 tablet,  # refills: 3   Last Office Visit: 11/27/2018 Samantha Mcdonald MD   Future Office Visit:      90 tablet 3     Sig: TAKE ONE TABLET BY MOUTH ONCE DAILY       [facility] Protocol Passed - 11/17/2019  7:23 PM        Passed - Recent (12 mo) or future (30 days) visit within the authorizing provider's specialty     Patient has had an office visit with the authorizing provider or a provider within the authorizing providers department within the previous 12 mos or has a future within next 30 days. See \"Patient Info\" tab in inbasket, or \"Choose Columns\" in Meds & Orders section of the refill encounter.              Passed - Medication is active on med list        Passed - Patient is age 18 or older        Passed - No active pregnancy on record        Passed - No positive pregnancy test in last 12 months          "

## 2019-11-26 NOTE — TELEPHONE ENCOUNTER
The pt is aware and scheduled for her upcoming appointment.   Ivett Gordon on 11/26/2019 at 8:41 AM

## 2019-12-13 ENCOUNTER — OFFICE VISIT (OUTPATIENT)
Dept: PEDIATRICS | Facility: CLINIC | Age: 54
End: 2019-12-13
Payer: COMMERCIAL

## 2019-12-13 ENCOUNTER — TELEPHONE (OUTPATIENT)
Dept: PEDIATRICS | Facility: CLINIC | Age: 54
End: 2019-12-13

## 2019-12-13 VITALS
WEIGHT: 174 LBS | SYSTOLIC BLOOD PRESSURE: 124 MMHG | TEMPERATURE: 97.9 F | BODY MASS INDEX: 25.77 KG/M2 | DIASTOLIC BLOOD PRESSURE: 76 MMHG | RESPIRATION RATE: 16 BRPM | HEIGHT: 69 IN | OXYGEN SATURATION: 99 % | HEART RATE: 65 BPM

## 2019-12-13 DIAGNOSIS — L53.9 ERYTHEMA OF BREAST: ICD-10-CM

## 2019-12-13 DIAGNOSIS — N64.4 PAIN OF LEFT BREAST: Primary | ICD-10-CM

## 2019-12-13 DIAGNOSIS — F41.1 GENERALIZED ANXIETY DISORDER: ICD-10-CM

## 2019-12-13 PROCEDURE — 99214 OFFICE O/P EST MOD 30 MIN: CPT | Performed by: NURSE PRACTITIONER

## 2019-12-13 RX ORDER — CITALOPRAM HYDROBROMIDE 20 MG/1
20 TABLET ORAL DAILY
Qty: 90 TABLET | Refills: 0 | Status: SHIPPED | OUTPATIENT
Start: 2019-12-13 | End: 2020-03-15

## 2019-12-13 RX ORDER — CEPHALEXIN 500 MG/1
500 CAPSULE ORAL 4 TIMES DAILY
Qty: 28 CAPSULE | Refills: 0 | Status: CANCELLED | OUTPATIENT
Start: 2019-12-13 | End: 2019-12-20

## 2019-12-13 ASSESSMENT — MIFFLIN-ST. JEOR: SCORE: 1453.64

## 2019-12-13 NOTE — TELEPHONE ENCOUNTER
Her areola was sore on Wednesday night. Yesterday she noticed a redness around the areola. No streaking. Feels hard at the top of the areola. A little warm. A little swollen. Feels a little bump. She has had a biopsy on that breast about 5 years ago. Mammo 8/15/19 was normal.     Discussed with Callie Rosado NP. She should be seen today. Will come in for eval. Amaris Crawford RN on 12/13/2019 at 9:02 AM

## 2019-12-13 NOTE — PATIENT INSTRUCTIONS
Keep follow-up with Dr. Clark in March to discuss Celexa going forward.    Take the cephalexin that you have at home.    Can put moist heat to the area for 15-20 mins 3x daily.    I will keep you posted on the mammogram and US scheduling.    If you notice redness spreading, increased swelling, pain or fevers be seen right away.    Call to schedule the ultrasound and mammogram:  [phone]

## 2019-12-13 NOTE — PROGRESS NOTES
"Subjective   Josy John is a 54 year old female who presents to clinic today for the following health issues:    HPI   Concern - Breast Pain   (re-order celexa change to 20mg)  Onset: 2 days     Description:        Left breast, Redness around areola, hardness, some swelling, small mass.    Intensity: moderate    Progression of Symptoms:  same    Accompanying Signs & Symptoms:  None    Previous history of similar problem:   None    Precipitating factors:   Worsened by: None    Alleviating factors:  Improved by: None    Therapies Tried and outcome: None     Had normal mammogram 8/15/19.  Great aunt with breast cancer.  Previous benign breast biopsy done 6/23/16 for left breast pain was normal.  Denies any nipple discharge or pain.   Notes that redness to left breast has improved today but continues to have pain.    ANXIETY:  Has been Celexa 10 mg for \"many many years.\"  Admits to more stress and feels her blood pressure has been increased due to stress.  Admits she has been taking 20 mg tabs of Celexa for the past 1 month without problem.  Feels she has been feeling better, no side/effects. Wants to continue.    Reviewed and updated as needed this visit by Provider  Meds  Problems         Review of Systems   Otherwise ROS is negative except as stated above.        Objective    /76 (BP Location: Right arm, Patient Position: Chair, Cuff Size: Adult Regular)   Pulse 65   Temp 97.9  F (36.6  C) (Tympanic)   Resp 16   Ht 1.753 m (5' 9\")   Wt 78.9 kg (174 lb)   SpO2 99%   BMI 25.70 kg/m    Body mass index is 25.7 kg/m .  Physical Exam   GENERAL: healthy, alert and no distress  BREAST: no masses, no nipple discharge, slightly tender to touch over erythematous areas listed below, slightly firm skin noted 12 o'clock position superior to nipple, normal ducts, no open areas to skin  SKIN: faint erythema noted surrounding left nipple from 10 o'clock to 6 o'clock, no warmth  LYMPH: axillary: no adenopathy  PSYCH: " calm, cooperative, pleasant    Diagnostic Test Results:  none       Assessment & Plan     1. Pain of left breast  2. Erythema of breast  Feel most likely represents cellulitis, she has recent Rx for cephalexin at home (TID x7 days) so she was instructed to start this medication. Warm moist soaks. Also to schedule diagnostic mammo and US for further work-up. Discussed s/s to watch for of worsening condition with pt of when to be seen.  - US Breast Left Complete 4 Quadrants; Future  - MA Diagnostic Digital Bilateral; Future    3. Generalized anxiety disorder  Doing well on increase to 20 mg over the past 1 mos, 90# tabs given. Follow-up with PCP for future fills.  - citalopram (CELEXA) 20 MG tablet; Take 1 tablet (20 mg) by mouth daily  Dispense: 90 tablet; Refill: 0     TIME SPENT: 25 minutes spent face to face with > 50% of the time spent counseling, advising and coordinating care.     Patient Instructions   Keep follow-up with Dr. Clark in March to discuss Celexa going forward.    Take the cephalexin that you have at home.    Can put moist heat to the area for 15-20 mins 3x daily.    I will keep you posted on the mammogram and US scheduling.    If you notice redness spreading, increased swelling, pain or fevers be seen right away.    Call to schedule the ultrasound and mammogram:  [phone]            Return in about 1 week (around 12/20/2019), or if symptoms worsen or fail to improve.    Callie Rosado NP  [facility]

## 2019-12-16 ENCOUNTER — HEALTH MAINTENANCE LETTER (OUTPATIENT)
Age: 54
End: 2019-12-16

## 2019-12-17 ENCOUNTER — HOSPITAL ENCOUNTER (OUTPATIENT)
Dept: MAMMOGRAPHY | Facility: CLINIC | Age: 54
Discharge: HOME | End: 2019-12-17
Attending: NURSE PRACTITIONER | Admitting: NURSE PRACTITIONER
Payer: COMMERCIAL

## 2019-12-17 ENCOUNTER — HOSPITAL ENCOUNTER (OUTPATIENT)
Dept: MAMMOGRAPHY | Facility: CLINIC | Age: 54
End: 2019-12-17
Attending: NURSE PRACTITIONER
Payer: COMMERCIAL

## 2019-12-17 DIAGNOSIS — N64.4 PAIN OF LEFT BREAST: ICD-10-CM

## 2019-12-17 DIAGNOSIS — L53.9 ERYTHEMA OF BREAST: ICD-10-CM

## 2019-12-17 PROCEDURE — G0279 TOMOSYNTHESIS, MAMMO: HCPCS

## 2019-12-17 PROCEDURE — 76642 ULTRASOUND BREAST LIMITED: CPT | Mod: LT

## 2019-12-30 DIAGNOSIS — R80.9 MICROALBUMINURIA: ICD-10-CM

## 2019-12-30 DIAGNOSIS — I10 BENIGN HYPERTENSION: ICD-10-CM

## 2019-12-30 NOTE — TELEPHONE ENCOUNTER
Pending Prescriptions:                       Disp   Refills    lisinopril (PRINIVIL/ZESTRIL) 20 MG [id number] tab*0            Sig: Take 1 tablet (20 mg) by mouth daily

## 2019-12-31 RX ORDER — LISINOPRIL 20 MG/1
20 TABLET ORAL DAILY
Qty: 90 TABLET | Refills: 0 | Status: SHIPPED | OUTPATIENT
Start: 2019-12-31 | End: 2020-03-15

## 2019-12-31 NOTE — TELEPHONE ENCOUNTER
Pt calling and is completely out of medication and would like us to expedite it today if possible. Please advise further, Thanks!    Yvrose Lawson,     on 12/31/2019 at 8:26 AM

## 2019-12-31 NOTE — TELEPHONE ENCOUNTER
Prescription refilled x 1 per [facility] Refill Protocol.    Next 5 appointments (look out 90 days)    Mar 04, 2020  4:20 PM CST  (Arrive by 3:55 PM)  Adult Preventative [facility] with [facility]  [facility]) [address] [place] [address] [phone]

## 2020-02-27 ENCOUNTER — DOCUMENTATION ONLY (OUTPATIENT)
Dept: PEDIATRICS | Facility: CLINIC | Age: 55
End: 2020-02-27

## 2020-02-27 DIAGNOSIS — R80.9 MICROALBUMINURIA: Primary | ICD-10-CM

## 2020-02-27 DIAGNOSIS — I10 BENIGN HYPERTENSION: ICD-10-CM

## 2020-02-27 DIAGNOSIS — Z00.00 ENCOUNTER FOR WELL ADULT EXAM WITHOUT ABNORMAL FINDINGS: ICD-10-CM

## 2020-02-27 DIAGNOSIS — Z83.49 FAMILY HISTORY OF THYROID DISEASE: ICD-10-CM

## 2020-02-27 NOTE — PROGRESS NOTES
oJsy ferrari a lab only appt 2/29 and no open orders. Please place any future orders needed.    Thanks lab

## 2020-02-29 DIAGNOSIS — I10 BENIGN HYPERTENSION: ICD-10-CM

## 2020-02-29 DIAGNOSIS — R80.9 MICROALBUMINURIA: ICD-10-CM

## 2020-02-29 DIAGNOSIS — Z83.49 FAMILY HISTORY OF THYROID DISEASE: ICD-10-CM

## 2020-02-29 DIAGNOSIS — Z00.00 ENCOUNTER FOR WELL ADULT EXAM WITHOUT ABNORMAL FINDINGS: ICD-10-CM

## 2020-02-29 LAB
ALBUMIN SERPL-MCNC: 4.1 G/DL (ref 3.4–5)
ALP SERPL-CCNC: 87 U/L (ref 40–150)
ALT SERPL W P-5'-P-CCNC: 32 U/L (ref 0–50)
ANION GAP SERPL CALCULATED.3IONS-SCNC: 6 MMOL/L (ref 3–14)
AST SERPL W P-5'-P-CCNC: 25 U/L (ref 0–45)
BILIRUB SERPL-MCNC: 0.4 MG/DL (ref 0.2–1.3)
BUN SERPL-MCNC: 14 MG/DL (ref 7–30)
CALCIUM SERPL-MCNC: 9.1 MG/DL (ref 8.5–10.1)
CHLORIDE SERPL-SCNC: 98 MMOL/L (ref 94–109)
CHOLEST SERPL-MCNC: 238 MG/DL
CO2 SERPL-SCNC: 26 MMOL/L (ref 20–32)
CREAT SERPL-MCNC: 0.58 MG/DL (ref 0.52–1.04)
CREAT UR-MCNC: 26 MG/DL
ERYTHROCYTE [DISTWIDTH] IN BLOOD BY AUTOMATED COUNT: 12.1 % (ref 10–15)
GFR SERPL CREATININE-BSD FRML MDRD: >90 ML/MIN/{1.73_M2}
GLUCOSE SERPL-MCNC: 98 MG/DL (ref 70–99)
HCT VFR BLD AUTO: 40.3 % (ref 35–47)
HDLC SERPL-MCNC: 67 MG/DL
HGB BLD-MCNC: 13.7 G/DL (ref 11.7–15.7)
LDLC SERPL CALC-MCNC: 156 MG/DL
MCH RBC QN AUTO: 30.9 PG (ref 26.5–33)
MCHC RBC AUTO-ENTMCNC: 34 G/DL (ref 31.5–36.5)
MCV RBC AUTO: 91 FL (ref 78–100)
MICROALBUMIN UR-MCNC: 174 MG/L
MICROALBUMIN/CREAT UR: 677.04 MG/G CR (ref 0–25)
NONHDLC SERPL-MCNC: 171 MG/DL
PLATELET # BLD AUTO: 328 10E9/L (ref 150–450)
POTASSIUM SERPL-SCNC: 4.5 MMOL/L (ref 3.4–5.3)
PROT SERPL-MCNC: 8.7 G/DL (ref 6.8–8.8)
RBC # BLD AUTO: 4.43 10E12/L (ref 3.8–5.2)
SODIUM SERPL-SCNC: 130 MMOL/L (ref 133–144)
TRIGL SERPL-MCNC: 76 MG/DL
TSH SERPL DL<=0.005 MIU/L-ACNC: 1.16 MU/L (ref 0.4–4)
WBC # BLD AUTO: 5.8 10E9/L (ref 4–11)

## 2020-02-29 PROCEDURE — 82043 UR ALBUMIN QUANTITATIVE: CPT | Performed by: PEDIATRICS

## 2020-02-29 PROCEDURE — 80061 LIPID PANEL: CPT | Performed by: PEDIATRICS

## 2020-02-29 PROCEDURE — 84443 ASSAY THYROID STIM HORMONE: CPT | Performed by: PEDIATRICS

## 2020-02-29 PROCEDURE — 36415 COLL VENOUS BLD VENIPUNCTURE: CPT | Performed by: PEDIATRICS

## 2020-02-29 PROCEDURE — 85027 COMPLETE CBC AUTOMATED: CPT | Performed by: PEDIATRICS

## 2020-02-29 PROCEDURE — 80053 COMPREHEN METABOLIC PANEL: CPT | Performed by: PEDIATRICS

## 2020-03-15 DIAGNOSIS — F41.1 GENERALIZED ANXIETY DISORDER: ICD-10-CM

## 2020-03-15 DIAGNOSIS — R80.9 MICROALBUMINURIA: ICD-10-CM

## 2020-03-15 DIAGNOSIS — I10 BENIGN HYPERTENSION: ICD-10-CM

## 2020-03-15 RX ORDER — CITALOPRAM HYDROBROMIDE 20 MG/1
20 TABLET ORAL DAILY
Qty: 90 TABLET | Refills: 0 | Status: SHIPPED | OUTPATIENT
Start: 2020-03-15 | End: 2020-06-17

## 2020-03-15 RX ORDER — LISINOPRIL 20 MG/1
20 TABLET ORAL DAILY
Qty: 90 TABLET | Refills: 0 | Status: SHIPPED | OUTPATIENT
Start: 2020-03-15 | End: 2020-09-24 | Stop reason: DRUGHIGH

## 2020-03-16 ENCOUNTER — VIRTUAL VISIT (OUTPATIENT)
Dept: PEDIATRICS | Facility: CLINIC | Age: 55
End: 2020-03-16
Payer: COMMERCIAL

## 2020-03-16 ENCOUNTER — TELEPHONE (OUTPATIENT)
Dept: PEDIATRICS | Facility: CLINIC | Age: 55
End: 2020-03-16

## 2020-03-16 DIAGNOSIS — R80.9 MICROALBUMINURIA: ICD-10-CM

## 2020-03-16 DIAGNOSIS — I10 BENIGN HYPERTENSION: Primary | ICD-10-CM

## 2020-03-16 PROCEDURE — 99442 ZZC PHYSICIAN TELEPHONE EVALUATION 11-20 MIN: CPT | Performed by: PEDIATRICS

## 2020-03-16 RX ORDER — AMLODIPINE BESYLATE 2.5 MG/1
2.5 TABLET ORAL AT BEDTIME
Qty: 90 TABLET | Refills: 1 | Status: SHIPPED | OUTPATIENT
Start: 2020-03-16 | End: 2020-07-02

## 2020-03-16 RX ORDER — LISINOPRIL 30 MG/1
30 TABLET ORAL DAILY
Qty: 90 TABLET | Refills: 1 | Status: SHIPPED | OUTPATIENT
Start: 2020-03-16 | End: 2020-07-02

## 2020-03-16 NOTE — PROGRESS NOTES
"Josy John is a 55 year old female who is being evaluated via a billable telephone visit.      The patient has been notified of following:     \"This telephone visit will be conducted via a call between you and your physician/provider. We have found that certain health care needs can be provided without the need for a physical exam.  This service lets us provide the care you need with a short phone conversation.  If a prescription is necessary we can send it directly to your pharmacy.  If lab work is needed we can place an order for that and you can then stop by our lab to have the test done at a later time.    If during the course of the call the physician/provider feels a telephone visit is not appropriate, you will not be charged for this service.\"       Josy John complains of    Chief Complaint   Patient presents with     Results     Patient would like to discuss recent lab results and blood pressure        I have reviewed and updated the patient's Past Medical History, Social History, Family History and Medication List.    ALLERGIES  Julio Cisneros CMA    (MA signature)    TELEPHONE VISIT:  The 10-year ASCVD risk score (Cesilia DC Jr., et al., 2013) is: 2.5%    Values used to calculate the score:      Age: 55 years      Sex: Female      Is Non- : No      Diabetic: No      Tobacco smoker: No      Systolic Blood Pressure: 124 mmHg      Is BP treated: Yes      HDL Cholesterol: 67 mg/dL      Total Cholesterol: 238 mg/dL      Telephone visit scheduled today to discuss recent lab work and blood pressure.    Patient with a history of recurrent UTIs/pyelonephritis and kidney scarring in her youth now status post ureteral implantation.  In December 2018, she was evaluated in nephrology clinic and was diagnosed with chronic kidney disease stage I with proteinuria.  She has been maintained on lisinopril 20 mg daily.    With recent labs in preparation for our upcoming physical, patient's " urine microalbumin level was significantly increased above her past to level and now grossly elevated.  Patient is very concerned about this change.    Patient notes that her blood pressure has not been as well-controlled as it has been in the past.  In the mornings, her blood pressure has been under worsening control with most recent values in the 150s over 100s.  She is starting to have some morning headaches.  She denies symptoms related to obstructive sleep apnea.  Her , who actually does have obstructive sleep apnea, watches her and notes that she does not snore or have any apneic episodes.  She is currently taking both her lisinopril 20 mg and amlodipine 2.5 mg in the morning.    Patient also wants to review her recent cholesterol panel.  Her LDL has increased to 156 from 122 previously.  At the same time, her HDL has improved to 67 from 49 previously.  She has been working diligently on regular exercise including Pilates, walks at [employer], and weight training at the gym.  She plans to continue to incorporate more aerobic exercise over time.  Her cardiac endurance has improved and she notes no new cardiac or pulmonary symptoms.  She also denies any episodes of low blood pressure that would limit our ability to maximize her antihypertensive dosing.      Assessment/Plan:    ICD-10-CM    1. Benign hypertension  I10 **Basic metabolic panel FUTURE anytime     amLODIPine (NORVASC) 2.5 MG tablet     Protein  random urine with Creat Ratio     Albumin Random Urine Quantitative with Creat Ratio     lisinopril (ZESTRIL) 30 MG tablet   2. Microalbuminuria  R80.9 **Basic metabolic panel FUTURE anytime     Protein  random urine with Creat Ratio     Albumin Random Urine Quantitative with Creat Ratio     lisinopril (ZESTRIL) 30 MG tablet     Our plan for now is to increase her dose of lisinopril to 30 mg every morning.  I have asked her to move her amlodipine dosing to nighttime.  Given the extent of her  proteinuria, have also recommended that she follow-up with her nephrologist, Dr. Freire.  We will maintain her low-dose of 2.5 mg of amlodipine for now, but discussed the possibility that we will need to increase this in the future.  She will send me blood pressure updates via my chart in approximately 2 weeks time.  We will also plan to repeat her BMP and urine protein studies in 1 to 2 weeks time.  Questions were answered today, patient will follow-up with me for her physical in 3 months.    I have reviewed the note as documented above.  This accurately captures the substance of my conversation with the patient.    Phone call contact time  Call Started at 1:40pm  Call Ended at 1:57pm    Pamela Clark MD

## 2020-03-16 NOTE — TELEPHONE ENCOUNTER
Called patient to reschedule appointment for today (3/16/2020) due to COVID-19.     Okay to schedule patient for July. Please assist with scheduling upon callback.     Katie Cisneros CMA

## 2020-03-22 ENCOUNTER — HEALTH MAINTENANCE LETTER (OUTPATIENT)
Age: 55
End: 2020-03-22

## 2020-06-16 DIAGNOSIS — F41.1 GENERALIZED ANXIETY DISORDER: ICD-10-CM

## 2020-06-17 RX ORDER — CITALOPRAM HYDROBROMIDE 20 MG/1
TABLET ORAL
Qty: 30 TABLET | Refills: 0 | Status: SHIPPED | OUTPATIENT
Start: 2020-06-17 | End: 2020-07-02

## 2020-06-17 NOTE — TELEPHONE ENCOUNTER
Routing refill request to provider for review/approval because:  Pt has appt next week was given marcelo.   Will need RF before appt     Ольга Man RN

## 2020-07-02 ENCOUNTER — OFFICE VISIT (OUTPATIENT)
Dept: PEDIATRICS | Facility: CLINIC | Age: 55
End: 2020-07-02
Payer: COMMERCIAL

## 2020-07-02 VITALS
OXYGEN SATURATION: 99 % | TEMPERATURE: 98.1 F | WEIGHT: 176 LBS | HEIGHT: 69 IN | SYSTOLIC BLOOD PRESSURE: 116 MMHG | BODY MASS INDEX: 26.07 KG/M2 | HEART RATE: 66 BPM | DIASTOLIC BLOOD PRESSURE: 84 MMHG

## 2020-07-02 DIAGNOSIS — I10 BENIGN HYPERTENSION: ICD-10-CM

## 2020-07-02 DIAGNOSIS — R80.9 MICROALBUMINURIA: ICD-10-CM

## 2020-07-02 DIAGNOSIS — Z00.00 ROUTINE GENERAL MEDICAL EXAMINATION AT A HEALTH CARE FACILITY: Primary | ICD-10-CM

## 2020-07-02 DIAGNOSIS — Z23 NEED FOR TDAP VACCINATION: ICD-10-CM

## 2020-07-02 DIAGNOSIS — N84.1 CERVICAL POLYP: ICD-10-CM

## 2020-07-02 DIAGNOSIS — Z12.4 CERVICAL CANCER SCREENING: ICD-10-CM

## 2020-07-02 DIAGNOSIS — E55.9 VITAMIN D DEFICIENCY: ICD-10-CM

## 2020-07-02 DIAGNOSIS — F41.1 GENERALIZED ANXIETY DISORDER: ICD-10-CM

## 2020-07-02 PROCEDURE — G0145 SCR C/V CYTO,THINLAYER,RESCR: HCPCS | Performed by: PEDIATRICS

## 2020-07-02 PROCEDURE — 99396 PREV VISIT EST AGE 40-64: CPT | Mod: 25 | Performed by: PEDIATRICS

## 2020-07-02 PROCEDURE — 90715 TDAP VACCINE 7 YRS/> IM: CPT | Performed by: PEDIATRICS

## 2020-07-02 PROCEDURE — 87624 HPV HI-RISK TYP POOLED RSLT: CPT | Performed by: PEDIATRICS

## 2020-07-02 PROCEDURE — 36415 COLL VENOUS BLD VENIPUNCTURE: CPT | Performed by: PEDIATRICS

## 2020-07-02 PROCEDURE — 90471 IMMUNIZATION ADMIN: CPT | Performed by: PEDIATRICS

## 2020-07-02 PROCEDURE — 80048 BASIC METABOLIC PNL TOTAL CA: CPT | Performed by: PEDIATRICS

## 2020-07-02 PROCEDURE — 84156 ASSAY OF PROTEIN URINE: CPT | Performed by: PEDIATRICS

## 2020-07-02 PROCEDURE — 82043 UR ALBUMIN QUANTITATIVE: CPT | Performed by: PEDIATRICS

## 2020-07-02 RX ORDER — LISINOPRIL 30 MG/1
30 TABLET ORAL DAILY
Qty: 90 TABLET | Refills: 3 | Status: SHIPPED | OUTPATIENT
Start: 2020-07-02 | End: 2021-06-08

## 2020-07-02 RX ORDER — AMLODIPINE BESYLATE 2.5 MG/1
2.5 TABLET ORAL AT BEDTIME
Qty: 90 TABLET | Refills: 3 | Status: SHIPPED | OUTPATIENT
Start: 2020-07-02 | End: 2020-09-24

## 2020-07-02 RX ORDER — CITALOPRAM HYDROBROMIDE 20 MG/1
20 TABLET ORAL DAILY
Qty: 90 TABLET | Refills: 3 | Status: SHIPPED | OUTPATIENT
Start: 2020-07-02 | End: 2021-07-16

## 2020-07-02 ASSESSMENT — MIFFLIN-ST. JEOR: SCORE: 1457.71

## 2020-07-02 NOTE — PROGRESS NOTES
SUBJECTIVE:   CC: Josy John is an 55 year old woman who presents for preventive health visit.     Healthy Habits:    Do you get at least three servings of calcium containing foods daily (dairy, green leafy vegetables, etc.)? yes    Amount of exercise or daily activities, outside of work: 5 day(s) per week    Problems taking medications regularly No    Medication side effects: No    Have you had an eye exam in the past two years? no    Do you see a dentist twice per year? no    Do you have sleep apnea, excessive snoring or daytime drowsiness?no      Patient would like to know if ok to take Magnesium- to help with BP and sleeping     Today's PHQ-2 Score:   PHQ-2 ( 1999 Pfizer) 10/30/2019 11/27/2018   Q1: Little interest or pleasure in doing things 0 0   Q2: Feeling down, depressed or hopeless 0 0   PHQ-2 Score 0 0   Q1: Little interest or pleasure in doing things - Not at all   Q2: Feeling down, depressed or hopeless - Not at all   PHQ-2 Score - 0       Abuse: Current or Past(Physical, Sexual or Emotional)- No  Do you feel safe in your environment? Yes    Have you ever done Advance Care Planning? (For example, a Health Directive, POLST, or a discussion with a medical provider or your loved ones about your wishes): Yes, patient states has an Advance Care Planning document and will bring a copy to the clinic.    Social History     Tobacco Use     Smoking status: Never Smoker     Smokeless tobacco: Never Used   Substance Use Topics     Alcohol use: Yes     Comment: rare     If you drink alcohol do you typically have >3 drinks per day or >7 drinks per week? No                     Reviewed orders with patient.  Reviewed health maintenance and updated orders accordingly - Yes    Mammogram Screening: Patient over age 50, mutual decision to screen reflected in health maintenance.    Pertinent mammograms are reviewed under the imaging tab.  History of abnormal Pap smear: NO - age 30- 65 PAP every 3 years recommended  PAP  "/ HPV 5/14/2009 5/6/2008 11/22/2005   PAP NIL NIL NIL     Reviewed and updated as needed this visit by clinical staff  Tobacco  Allergies  Meds          HTN - has been running well controlled at home.  Had a virtual visit earlier this spring regarding blood pressure.  At that time, we increased lisinopril to 30 mg daily.  Patient also moved to amlodipine dosing to nighttime.  She reports improved control with this intervention and has been running as low as 100s over 60s when she is following this at home.  She has no new cardiovascular symptoms.    Her last lab results showed a significant increase in the amount of microalbumin that she was losing from her urine.  We had planned to have her follow-up with nephrology, unfortunately the coronavirus pandemic has limited her ability to see them.  She does plan to follow-up with them in short order.  She remains on an ACE inhibitor.  We plan to do repeat testing to further quantify her protein loss today.    Generalized anxiety disorder: Well-controlled on current dose of citalopram.    The patient has followed with dermatology for a full skin exam within the last 2 years.  She has no new skin concerns at present.    Reviewed and updated as needed this visit by Provider  Tobacco            ROS:   ROS: 10 point ROS neg other than the symptoms noted above in the HPI.      OBJECTIVE:   /84   Pulse 66   Temp 98.1  F (36.7  C) (Oral)   Ht 1.753 m (5' 9\")   Wt 79.8 kg (176 lb)   SpO2 99%   BMI 25.99 kg/m    EXAM:  GENERAL: healthy, alert and no distress  EYES: Eyes grossly normal to inspection, PERRL and conjunctivae and sclerae normal  HENT: ear canals and TM's normal, nose and mouth without ulcers or lesions  NECK: no adenopathy, no asymmetry, masses, or scars and thyroid normal to palpation  RESP: lungs clear to auscultation - no rales, rhonchi or wheezes  BREAST: normal without masses, tenderness or nipple discharge and no palpable axillary masses or " adenopathy  CV: regular rate and rhythm, normal S1 S2, no S3 or S4, no murmur, click or rub, no peripheral edema and peripheral pulses strong  ABDOMEN: soft, nontender, no hepatosplenomegaly, no masses and bowel sounds normal   (female): normal female external genitalia, normal urethral meatus, vaginal mucosa pink, moist, well rugated, and normal adnexa/uterus without masses or discharge.  Patient does have a 1cm cervical polyp without evidence of bleeding  MS: no gross musculoskeletal defects noted, no edema  SKIN: no suspicious lesions or rashes  NEURO: Normal strength and tone, mentation intact and speech normal  PSYCH: mentation appears normal, affect normal/bright    Diagnostic Test Results:  Labs reviewed in [facility]  New labs pending    ASSESSMENT/PLAN:       ICD-10-CM    1. Routine general medical examination at a health care facility  Z00.00 TDAP VACCINE    Brought up-to-date with healthcare screenings today.   2. Benign hypertension  I10 lisinopril (ZESTRIL) 30 MG tablet     amLODIPine (NORVASC) 2.5 MG tablet     Basic metabolic panel     Albumin Random Urine Quantitative with Creat Ratio     Protein  random urine with Creat Ratio    Improved control compared to our last visit.  Continue to monitor.  Continue current antihypertensive dosing.  Plan to have patient follow-up with nephrology to review her microalbuminuria.   3. Vitamin D deficiency  E55.9  reviewed her vitamin D supplementation.   4. Cervical polyp  N84.1  patient follows with Dr. Sneha Wheat at  [facility].  Instructed her to follow-up for repeat exam regarding cervical polyp identified while doing Pap smear today.   5. Generalized anxiety disorder  F41.1 citalopram (CELEXA) 20 MG tablet  Well-controlled, continue current medications   6. Microalbuminuria  R80.9 lisinopril (ZESTRIL) 30 MG tablet  As above, repeat testing to further quantify the protein in her urine today.  Recommended the patient follow-up with nephrology.   7. Need for Tdap  "vaccination  Z23 TDAP VACCINE   8. Cervical cancer screening  Z12.4 Pap imaged thin layer screen with HPV - recommended age 30 - 65 years (select HPV order below)     HPV High Risk Types DNA Cervical       COUNSELING:   Special attention given to:        Regular exercise       Healthy diet/nutrition       Vision screening       Osteoporosis Prevention/Bone Health       Colon cancer screening    Estimated body mass index is 25.99 kg/m  as calculated from the following:    Height as of this encounter: 1.753 m (5' 9\").    Weight as of this encounter: 79.8 kg (176 lb).    Weight management plan: Discussed healthy diet and exercise guidelines     reports that she has never smoked. She has never used smokeless tobacco.      Counseling Resources:  ATP IV Guidelines  Pooled Cohorts Equation Calculator  Breast Cancer Risk Calculator  FRAX Risk Assessment  ICSI Preventive Guidelines  Dietary Guidelines for Americans, 2010  USDA's MyPlate  ASA Prophylaxis  Lung CA Screening    Pamela Clark MD  [facility]  "

## 2020-07-02 NOTE — PATIENT INSTRUCTIONS
Stop at the lab on your way out    Mammogram not due until December    Refills sent to the pharmacy    TdaP booster today    Pap smear today    Call for visit with nephrology to review your case when then open    Make an appt with Dr Wheat to have a repeat exam of your cervical polyp        Preventive Health Recommendations  Female Ages 50 - 64    Yearly exam: See your health care provider every year in order to  o Review health changes.   o Discuss preventive care.    o Review your medicines if your doctor has prescribed any.      Get a Pap test every three years (unless you have an abnormal result and your provider advises testing more often).    If you get Pap tests with HPV test, you only need to test every 5 years, unless you have an abnormal result.     You do not need a Pap test if your uterus was removed (hysterectomy) and you have not had cancer.    You should be tested each year for STDs (sexually transmitted diseases) if you're at risk.     Have a mammogram every 1 to 2 years.    Have a colonoscopy at age 50, or have a yearly FIT test (stool test). These exams screen for colon cancer.      Have a cholesterol test every 5 years, or more often if advised.    Have a diabetes test (fasting glucose) every three years. If you are at risk for diabetes, you should have this test more often.     If you are at risk for osteoporosis (brittle bone disease), think about having a bone density scan (DEXA).    Shots: Get a flu shot each year. Get a tetanus shot every 10 years.    Nutrition:     Eat at least 5 servings of fruits and vegetables each day.    Eat whole-grain bread, whole-wheat pasta and brown rice instead of white grains and rice.    Get adequate Calcium and Vitamin D.     Lifestyle    Exercise at least 150 minutes a week (30 minutes a day, 5 days a week). This will help you control your weight and prevent disease.    Limit alcohol to one drink per day.    No smoking.     Wear sunscreen to prevent skin  cancer.     See your dentist every six months for an exam and cleaning.    See your eye doctor every 1 to 2 years.

## 2020-07-03 LAB
ANION GAP SERPL CALCULATED.3IONS-SCNC: 3 MMOL/L (ref 3–14)
BUN SERPL-MCNC: 14 MG/DL (ref 7–30)
CALCIUM SERPL-MCNC: 9.1 MG/DL (ref 8.5–10.1)
CHLORIDE SERPL-SCNC: 99 MMOL/L (ref 94–109)
CO2 SERPL-SCNC: 29 MMOL/L (ref 20–32)
CREAT SERPL-MCNC: 0.68 MG/DL (ref 0.52–1.04)
GFR SERPL CREATININE-BSD FRML MDRD: >90 ML/MIN/{1.73_M2}
GLUCOSE SERPL-MCNC: 86 MG/DL (ref 70–99)
POTASSIUM SERPL-SCNC: 3.9 MMOL/L (ref 3.4–5.3)
PROT UR-MCNC: 0.13 G/L
PROT/CREAT 24H UR: 0.58 G/G CR (ref 0–0.2)
SODIUM SERPL-SCNC: 131 MMOL/L (ref 133–144)

## 2020-07-05 LAB
CREAT UR-MCNC: 22 MG/DL
MICROALBUMIN UR-MCNC: 100 MG/L
MICROALBUMIN/CREAT UR: 452.49 MG/G CR (ref 0–25)

## 2020-07-09 LAB
COPATH REPORT: NORMAL
PAP: NORMAL

## 2020-07-13 LAB
FINAL DIAGNOSIS: NORMAL
HPV HR 12 DNA CVX QL NAA+PROBE: NEGATIVE
HPV16 DNA SPEC QL NAA+PROBE: NEGATIVE
HPV18 DNA SPEC QL NAA+PROBE: NEGATIVE
SPECIMEN DESCRIPTION: NORMAL
SPECIMEN SOURCE CVX/VAG CYTO: NORMAL

## 2020-08-12 DIAGNOSIS — R80.9 ALBUMINURIA: ICD-10-CM

## 2020-08-12 DIAGNOSIS — E87.1 LOW SODIUM LEVELS: ICD-10-CM

## 2020-08-12 DIAGNOSIS — R80.9 MICROALBUMINURIA: Primary | ICD-10-CM

## 2020-09-14 ENCOUNTER — VIRTUAL VISIT (OUTPATIENT)
Dept: NEPHROLOGY | Facility: CLINIC | Age: 55
End: 2020-09-14
Attending: INTERNAL MEDICINE
Payer: COMMERCIAL

## 2020-09-14 DIAGNOSIS — N18.1 CKD (CHRONIC KIDNEY DISEASE) STAGE 1, GFR 90 ML/MIN OR GREATER: ICD-10-CM

## 2020-09-14 DIAGNOSIS — E87.1 HYPONATREMIA: Primary | ICD-10-CM

## 2020-09-14 NOTE — PROGRESS NOTES
Nephrology Clinic - Telephone Visit    Sam Freire MD  2020     Name: Josy John  MRN: [id number]  Age: 55 year old  : 1965  Referring provider: Samantha Mcdonald     Assessment and Plan:     CKD, stage 1/albuminuria: She has preserved kidney function with minimal albuminuria.   She has no microscopic hematuria or evidence of active urinary sediment.  I suspect she sustained some renal injury at an early age as a result of recurrent UTI/pyelonephritis form urinary reflux (that has since been corrected with ureteral reimplantation).  As a result, she likely has some mild scarring resulting in minimal albuminuria (452 mg/g in 2020 which was an increase from 138 in 2018).      - renal ultrasound performed and essentially normal  - continue RAAS blockade for renoprotection/albuminuria with lisinopril though will increase from 20 to 30 mg daily  - no pressing need to increase lisinopril dose further as long as urine albumin remains < 500 mg/g  - return to clinic in 3 months     Hypertension: Primary. Appears slightly above goal at times of a at least <130/80.    -will discontinue amlodipine (2.5 mg) and increase lisinopril to 30 mg daily    Hyponatermia: Sodium in the low 130's but stable.  Asymptomatic.    -will evaluate with urine studies at her f/u visit in 3 months.  If she becomes frankly hyponatremic then we will see her sooner.      Follow-up: RTC in 3 months     HPI:   Josy John is a 53 year old woman with a history of hypertension (controlled on lisinopril and amlodipine) who was referred for nephrology consult by her PCP due to proteinuria. The patient reports when she was 5 y.o. She had recurrent UTI/pyelonephritis for approximately a year and finally had ureteral implantation to prevent suspected ureteral reflux. Since then, she has not had any significant history of UTI. She states she was diagnosed with hypertension many years ago. She was first started on  hydrochlorothiazide but then switched to amlodipine and lisinopril. She previously was on 20 mg lisinopril once daily, which caused her to have significant muscle cramping, so this was decreased to 10 mg once daily but later returned to 20 mg daily.     She was seen by her PCP and noted to have proteinuria and referred to nephrology.  The patient does express concern for why these proteins are showing up in her urine. She notes she had occipital headaches when she wakes up in the morning last year and wonders if this is related to hormone levels or caffeine intake. More recenly she reports having migraines that she thinks may be related to undergoing menopause.  She denies any significant pain medication use, though she does use tylenol during arthritis flares. She also denies any history of kidney stones or diabetes.  She has no family history of kidney disease.  Her lisinopril remains at 20 mg daily and she continues on amlodipine at 2.5 mg dialy.  Her home BP varies and is sometimes in closer to 138/80's more recently.  She continues on a low salt diet and exercises regularly.            Review of Systems:   Pertinent items are noted in HPI or as below, remainder of complete ROS is negative.      Active Medications:   Current Outpatient Medications   Medication Sig Dispense Refill     Cholecalciferol (VITAMIN D3 PO) Take 1,000 Units by mouth daily       citalopram (CELEXA) 20 MG tablet Take 1 tablet (20 mg) by mouth daily 90 tablet 3     lisinopril (ZESTRIL) 30 MG tablet Take 1 tablet (30 mg) by mouth daily 90 tablet 3     omega 3 1000 MG CAPS Take 1 g by mouth daily 90 capsule         Allergies:   Zoloft      Past Medical History:  Generalized anxiety disorder   Hypertension, controlled  Microalbuminuria  Vitamin D deficiency     Past Surgical History:  Reimplantation of ureters    Family History:   No family history of kidney disease. Family hypertension (mother, brother, and father).      Social History:   The  patient has never smoked. Denies alcohol use.     Physical Exam:  Physical exam deferred as encounter ws a telephone visit.      Laboratory:  CMP  Recent Labs   Lab Test 07/02/20  1545 02/29/20  0910 12/27/18  0229 11/27/18  1630  06/20/16  0854  10/09/14  1530  03/14/13  1012   * 130* 133 134   < > 138   < > 134   < > 138   POTASSIUM 3.9 4.5 4.2 4.2   < > 3.9   < > 4.1   < > 4.1   CHLORIDE 99 98 100 98   < > 104   < > 101   < > 102   CO2 29 26 30 23   < > 27   < > 25   < > 26   ANIONGAP 3 6 4 13   < > 7   < > 8   < > 10   GLC 86 98 96 79   < > 87   < > 91   < > 88   BUN 14 14 14 15   < > 9   < > 14   < > 14   CR 0.68 0.58 0.64 0.67   < > 0.71   < > 0.74   < > 0.79   GFRESTIMATED >90 >90 >90 >90   < > 86   < > 83   < > 78   GFRESTBLACK >90 >90 >90 >90   < > >90  African American GFR Calc     < > >90   GFR Calc     < > >90   KRIS 9.1 9.1 8.9 9.5   < > 8.8   < > 8.9   < > 9.1   PHOS  --   --  4.2  --   --   --   --   --   --   --    PROTTOTAL  --  8.7  --   --   --  8.4  --  8.2  --  8.9*   ALBUMIN  --  4.1 3.9  --   --  4.0  --  3.8*  --  4.6   BILITOTAL  --  0.4  --   --   --  0.4  --  0.2  --  0.5   ALKPHOS  --  87  --   --   --  68  --  98  --  74   AST  --  25  --   --   --  22  --  21  --  34   ALT  --  32  --   --   --  24  --  27  --  21    < > = values in this interval not displayed.     CBC  Recent Labs   Lab Test 02/29/20  0910 12/27/18  0229 07/16/18  1545 12/22/17  1521   HGB 13.7 12.5 13.0 11.7   WBC 5.8 6.8 7.4 9.1   RBC 4.43 4.03 4.08 3.65*   HCT 40.3 37.5 38.4 33.7*   MCV 91 93 94 92   MCH 30.9 31.0 31.9 32.1   MCHC 34.0 33.3 33.9 34.7   RDW 12.1 11.9 11.8 12.0    304 307 327       URINE STUDIES  Recent Labs   Lab Test 12/27/18  1433 12/22/17  1521   COLOR Straw Yellow   APPEARANCE Clear Clear   URINEGLC Negative Negative   URINEBILI Negative Negative   URINEKETONE Negative 15*   SG 1.008 1.015   UBLD Negative Small*   URINEPH 7.0 6.5   PROTEIN Negative Negative  "  UROBILINOGEN  --  0.2   NITRITE Negative Negative   LEUKEST Negative Negative   RBCU 1 O - 2   WBCU <1 O - 2     Recent Labs   Lab Test 07/02/20  1546 12/27/18  1433   UTPG 0.58* 0.38*     PTH  No lab results found.  IRON STUDIES   Recent Labs   Lab Test 06/17/15  1550 10/28/14  1453 10/09/14  1530   IRON 80  --   --      --   --    IRONSAT 20  --   --    YUN 12 18 3*        Josy John is a 55 year old female who is being evaluated via a billable telephone visit.      The patient has been notified of following:     \"This telephone visit will be conducted via a call between you and your physician/provider. We have found that certain health care needs can be provided without the need for a physical exam.  This service lets us provide the care you need with a short phone conversation.  If a prescription is necessary we can send it directly to your pharmacy.  If lab work is needed we can place an order for that and you can then stop by our lab to have the test done at a later time.    Telephone visits are billed at different rates depending on your insurance coverage. During this emergency period, for some insurers they may be billed the same as an in-person visit.  Please reach out to your insurance provider with any questions.    If during the course of the call the physician/provider feels a telephone visit is not appropriate, you will not be charged for this service.\"    Patient has given verbal consent for Telephone visit?  Yes    What phone number would you like to be contacted at? [phone]    How would you like to obtain your AVS? Jasmin    Phone call duration: 35 minutes    Sam Freire MD      "

## 2020-09-14 NOTE — LETTER
2020       RE: Josy John  [address] [place] [address]     Dear Colleague,    Thank you for referring your patient, Josy John, to the [facility] NEPHROLOGY at [facility]. Please see a copy of my visit note below.        Nephrology Clinic - Telephone Visit    Sam Freire MD  2020     Name: Josy John  MRN: [id number]  Age: 55 year old  : 1965  Referring provider: Samantha Mcdonald     Assessment and Plan:     CKD, stage 1/albuminuria: She has preserved kidney function with minimal albuminuria.   She has no microscopic hematuria or evidence of active urinary sediment.  I suspect she sustained some renal injury at an early age as a result of recurrent UTI/pyelonephritis form urinary reflux (that has since been corrected with ureteral reimplantation).  As a result, she likely has some mild scarring resulting in minimal albuminuria (452 mg/g in 2020 which was an increase from 138 in 2018).      - renal ultrasound performed and essentially normal  - continue RAAS blockade for renoprotection/albuminuria with lisinopril though will increase from 20 to 30 mg daily  - no pressing need to increase lisinopril dose further as long as urine albumin remains < 500 mg/g  - return to clinic in 3 months     Hypertension: Primary. Appears slightly above goal at times of a at least <130/80.    -will discontinue amlodipine (2.5 mg) and increase lisinopril to 30 mg daily    Hyponatermia: Sodium in the low 130's but stable.  Asymptomatic.    -will evaluate with urine studies at her f/u visit in 3 months.  If she becomes frankly hyponatremic then we will see her sooner.      Follow-up: RTC in 3 months     HPI:   Josy John is a 53 year old woman with a history of hypertension (controlled on lisinopril and amlodipine) who was referred for nephrology consult by her PCP due to proteinuria. The patient reports when she was 5 y.o. She had  recurrent UTI/pyelonephritis for approximately a year and finally had ureteral implantation to prevent suspected ureteral reflux. Since then, she has not had any significant history of UTI. She states she was diagnosed with hypertension many years ago. She was first started on hydrochlorothiazide but then switched to amlodipine and lisinopril. She previously was on 20 mg lisinopril once daily, which caused her to have significant muscle cramping, so this was decreased to 10 mg once daily but later returned to 20 mg daily.     She was seen by her PCP and noted to have proteinuria and referred to nephrology.  The patient does express concern for why these proteins are showing up in her urine. She notes she had occipital headaches when she wakes up in the morning last year and wonders if this is related to hormone levels or caffeine intake. More recenly she reports having migraines that she thinks may be related to undergoing menopause.  She denies any significant pain medication use, though she does use tylenol during arthritis flares. She also denies any history of kidney stones or diabetes.  She has no family history of kidney disease.  Her lisinopril remains at 20 mg daily and she continues on amlodipine at 2.5 mg dialy.  Her home BP varies and is sometimes in closer to 138/80's more recently.  She continues on a low salt diet and exercises regularly.            Review of Systems:   Pertinent items are noted in HPI or as below, remainder of complete ROS is negative.      Active Medications:   Current Outpatient Medications   Medication Sig Dispense Refill     Cholecalciferol (VITAMIN D3 PO) Take 1,000 Units by mouth daily       citalopram (CELEXA) 20 MG tablet Take 1 tablet (20 mg) by mouth daily 90 tablet 3     lisinopril (ZESTRIL) 30 MG tablet Take 1 tablet (30 mg) by mouth daily 90 tablet 3     omega 3 1000 MG CAPS Take 1 g by mouth daily 90 capsule         Allergies:   Zoloft      Past Medical  History:  Generalized anxiety disorder   Hypertension, controlled  Microalbuminuria  Vitamin D deficiency     Past Surgical History:  Reimplantation of ureters    Family History:   No family history of kidney disease. Family hypertension (mother, brother, and father).      Social History:   The patient has never smoked. Denies alcohol use.     Physical Exam:  Physical exam deferred as encounter ws a telephone visit.      Laboratory:  CMP  Recent Labs   Lab Test 07/02/20  1545 02/29/20  0910 12/27/18  0229 11/27/18  1630  06/20/16  0854  10/09/14  1530  03/14/13  1012   * 130* 133 134   < > 138   < > 134   < > 138   POTASSIUM 3.9 4.5 4.2 4.2   < > 3.9   < > 4.1   < > 4.1   CHLORIDE 99 98 100 98   < > 104   < > 101   < > 102   CO2 29 26 30 23   < > 27   < > 25   < > 26   ANIONGAP 3 6 4 13   < > 7   < > 8   < > 10   GLC 86 98 96 79   < > 87   < > 91   < > 88   BUN 14 14 14 15   < > 9   < > 14   < > 14   CR 0.68 0.58 0.64 0.67   < > 0.71   < > 0.74   < > 0.79   GFRESTIMATED >90 >90 >90 >90   < > 86   < > 83   < > 78   GFRESTBLACK >90 >90 >90 >90   < > >90  African American GFR Calc     < > >90   GFR Calc     < > >90   KRIS 9.1 9.1 8.9 9.5   < > 8.8   < > 8.9   < > 9.1   PHOS  --   --  4.2  --   --   --   --   --   --   --    PROTTOTAL  --  8.7  --   --   --  8.4  --  8.2  --  8.9*   ALBUMIN  --  4.1 3.9  --   --  4.0  --  3.8*  --  4.6   BILITOTAL  --  0.4  --   --   --  0.4  --  0.2  --  0.5   ALKPHOS  --  87  --   --   --  68  --  98  --  74   AST  --  25  --   --   --  22  --  21  --  34   ALT  --  32  --   --   --  24  --  27  --  21    < > = values in this interval not displayed.     CBC  Recent Labs   Lab Test 02/29/20  0910 12/27/18  0229 07/16/18  1545 12/22/17  1521   HGB 13.7 12.5 13.0 11.7   WBC 5.8 6.8 7.4 9.1   RBC 4.43 4.03 4.08 3.65*   HCT 40.3 37.5 38.4 33.7*   MCV 91 93 94 92   MCH 30.9 31.0 31.9 32.1   MCHC 34.0 33.3 33.9 34.7   RDW 12.1 11.9 11.8 12.0    304 307 327  "      URINE STUDIES  Recent Labs   Lab Test 12/27/18  1433 12/22/17  1521   COLOR Straw Yellow   APPEARANCE Clear Clear   URINEGLC Negative Negative   URINEBILI Negative Negative   URINEKETONE Negative 15*   SG 1.008 1.015   UBLD Negative Small*   URINEPH 7.0 6.5   PROTEIN Negative Negative   UROBILINOGEN  --  0.2   NITRITE Negative Negative   LEUKEST Negative Negative   RBCU 1 O - 2   WBCU <1 O - 2     Recent Labs   Lab Test 07/02/20  1546 12/27/18  1433   UTPG 0.58* 0.38*     PTH  No lab results found.  IRON STUDIES   Recent Labs   Lab Test 06/17/15  1550 10/28/14  1453 10/09/14  1530   IRON 80  --   --      --   --    IRONSAT 20  --   --    YUN 12 18 3*        Josy John is a 55 year old female who is being evaluated via a billable telephone visit.      The patient has been notified of following:     \"This telephone visit will be conducted via a call between you and your physician/provider. We have found that certain health care needs can be provided without the need for a physical exam.  This service lets us provide the care you need with a short phone conversation.  If a prescription is necessary we can send it directly to your pharmacy.  If lab work is needed we can place an order for that and you can then stop by our lab to have the test done at a later time.    Telephone visits are billed at different rates depending on your insurance coverage. During this emergency period, for some insurers they may be billed the same as an in-person visit.  Please reach out to your insurance provider with any questions.    If during the course of the call the physician/provider feels a telephone visit is not appropriate, you will not be charged for this service.\"    Patient has given verbal consent for Telephone visit?  Yes    What phone number would you like to be contacted at? [phone]    How would you like to obtain your AVS? Jasmin    Phone call duration: 35 minutes    Sam Freire" MD

## 2020-10-05 DIAGNOSIS — E87.1 LOW SODIUM LEVELS: ICD-10-CM

## 2020-10-05 DIAGNOSIS — R80.9 ALBUMINURIA: ICD-10-CM

## 2020-10-05 DIAGNOSIS — R80.9 MICROALBUMINURIA: ICD-10-CM

## 2020-10-05 DIAGNOSIS — E87.1 HYPONATREMIA: ICD-10-CM

## 2020-10-05 DIAGNOSIS — N18.1 CKD (CHRONIC KIDNEY DISEASE) STAGE 1, GFR 90 ML/MIN OR GREATER: ICD-10-CM

## 2020-10-05 LAB
ALBUMIN SERPL-MCNC: 4.1 G/DL (ref 3.4–5)
ALBUMIN UR-MCNC: 30 MG/DL
ANION GAP SERPL CALCULATED.3IONS-SCNC: 6 MMOL/L (ref 3–14)
APPEARANCE UR: CLEAR
BILIRUB UR QL STRIP: NEGATIVE
BUN SERPL-MCNC: 14 MG/DL (ref 7–30)
CALCIUM SERPL-MCNC: 9.3 MG/DL (ref 8.5–10.1)
CHLORIDE SERPL-SCNC: 99 MMOL/L (ref 94–109)
CO2 SERPL-SCNC: 26 MMOL/L (ref 20–32)
COLOR UR AUTO: YELLOW
CREAT SERPL-MCNC: 0.76 MG/DL (ref 0.52–1.04)
ERYTHROCYTE [DISTWIDTH] IN BLOOD BY AUTOMATED COUNT: 12.1 % (ref 10–15)
GFR SERPL CREATININE-BSD FRML MDRD: 88 ML/MIN/{1.73_M2}
GLUCOSE SERPL-MCNC: 80 MG/DL (ref 70–99)
GLUCOSE UR STRIP-MCNC: NEGATIVE MG/DL
HCT VFR BLD AUTO: 39.3 % (ref 35–47)
HGB BLD-MCNC: 13.3 G/DL (ref 11.7–15.7)
HGB UR QL STRIP: ABNORMAL
KETONES UR STRIP-MCNC: NEGATIVE MG/DL
LEUKOCYTE ESTERASE UR QL STRIP: NEGATIVE
MCH RBC QN AUTO: 31.2 PG (ref 26.5–33)
MCHC RBC AUTO-ENTMCNC: 33.8 G/DL (ref 31.5–36.5)
MCV RBC AUTO: 92 FL (ref 78–100)
NITRATE UR QL: NEGATIVE
OSMOLALITY UR: 492 MMOL/KG (ref 100–1200)
PH UR STRIP: 7.5 PH (ref 5–7)
PHOSPHATE SERPL-MCNC: 3.7 MG/DL (ref 2.5–4.5)
PLATELET # BLD AUTO: 316 10E9/L (ref 150–450)
POTASSIUM SERPL-SCNC: 4.2 MMOL/L (ref 3.4–5.3)
PROT UR-MCNC: 0.28 G/L
PROT/CREAT 24H UR: 0.29 G/G CR (ref 0–0.2)
RBC # BLD AUTO: 4.26 10E12/L (ref 3.8–5.2)
RBC #/AREA URNS AUTO: ABNORMAL /HPF
SODIUM SERPL-SCNC: 131 MMOL/L (ref 133–144)
SODIUM UR-SCNC: 43 MMOL/L
SOURCE: ABNORMAL
SP GR UR STRIP: 1.02 (ref 1–1.03)
UROBILINOGEN UR STRIP-ACNC: 0.2 EU/DL (ref 0.2–1)
WBC # BLD AUTO: 5.5 10E9/L (ref 4–11)
WBC #/AREA URNS AUTO: ABNORMAL /HPF

## 2020-10-05 PROCEDURE — 82306 VITAMIN D 25 HYDROXY: CPT | Performed by: INTERNAL MEDICINE

## 2020-10-05 PROCEDURE — 99000 SPECIMEN HANDLING OFFICE-LAB: CPT | Performed by: INTERNAL MEDICINE

## 2020-10-05 PROCEDURE — 36415 COLL VENOUS BLD VENIPUNCTURE: CPT | Performed by: INTERNAL MEDICINE

## 2020-10-05 PROCEDURE — 85027 COMPLETE CBC AUTOMATED: CPT | Performed by: INTERNAL MEDICINE

## 2020-10-05 PROCEDURE — 83935 ASSAY OF URINE OSMOLALITY: CPT | Mod: 90 | Performed by: INTERNAL MEDICINE

## 2020-10-05 PROCEDURE — 84300 ASSAY OF URINE SODIUM: CPT | Performed by: INTERNAL MEDICINE

## 2020-10-05 PROCEDURE — 81001 URINALYSIS AUTO W/SCOPE: CPT | Performed by: INTERNAL MEDICINE

## 2020-10-05 PROCEDURE — 84156 ASSAY OF PROTEIN URINE: CPT | Performed by: INTERNAL MEDICINE

## 2020-10-05 PROCEDURE — 80069 RENAL FUNCTION PANEL: CPT | Performed by: INTERNAL MEDICINE

## 2020-10-06 LAB
DEPRECATED CALCIDIOL+CALCIFEROL SERPL-MC: <37 UG/L (ref 20–75)
VITAMIN D2 SERPL-MCNC: <5 UG/L
VITAMIN D3 SERPL-MCNC: 32 UG/L

## 2020-11-27 DIAGNOSIS — R80.9 MICROALBUMINURIA: ICD-10-CM

## 2020-11-27 DIAGNOSIS — N18.1 CKD (CHRONIC KIDNEY DISEASE) STAGE 1, GFR 90 ML/MIN OR GREATER: Primary | ICD-10-CM

## 2020-12-15 ENCOUNTER — DOCUMENTATION ONLY (OUTPATIENT)
Dept: CARE COORDINATION | Facility: CLINIC | Age: 55
End: 2020-12-15

## 2020-12-21 ENCOUNTER — VIRTUAL VISIT (OUTPATIENT)
Dept: NEPHROLOGY | Facility: CLINIC | Age: 55
End: 2020-12-21
Attending: PEDIATRICS
Payer: COMMERCIAL

## 2020-12-21 DIAGNOSIS — R80.1 PERSISTENT PROTEINURIA: ICD-10-CM

## 2020-12-21 DIAGNOSIS — I10 BENIGN ESSENTIAL HYPERTENSION: Primary | ICD-10-CM

## 2020-12-21 PROCEDURE — 99214 OFFICE O/P EST MOD 30 MIN: CPT | Mod: TEL | Performed by: INTERNAL MEDICINE

## 2020-12-21 RX ORDER — MULTIVITAMIN WITH IRON
1 TABLET ORAL DAILY
COMMUNITY

## 2020-12-21 RX ORDER — LISINOPRIL 20 MG/1
20 TABLET ORAL 2 TIMES DAILY
Qty: 120 TABLET | Refills: 3 | Status: SHIPPED | OUTPATIENT
Start: 2020-12-21 | End: 2021-09-13

## 2020-12-21 NOTE — PROGRESS NOTES
"Josy John is a 55 year old female who is being evaluated via a billable telephone visit.      The patient has been notified of following:     \"This telephone visit will be conducted via a call between you and your physician/provider. We have found that certain health care needs can be provided without the need for a physical exam.  This service lets us provide the care you need with a short phone conversation.  If a prescription is necessary we can send it directly to your pharmacy.  If lab work is needed we can place an order for that and you can then stop by our lab to have the test done at a later time.    Telephone visits are billed at different rates depending on your insurance coverage. During this emergency period, for some insurers they may be billed the same as an in-person visit.  Please reach out to your insurance provider with any questions.    If during the course of the call the physician/provider feels a telephone visit is not appropriate, you will not be charged for this service.\"    Patient has given verbal consent for Telephone visit?  Yes    What phone number would you like to be contacted at? [phone]    How would you like to obtain your AVS? Jasmin    Phone call duration: 9:16-9.44 AM    Time with staff Dr Freire: 10:02-10:10 AM    Bhupendra Faulkner MD    December 21, 2020    Assessment/Plan:   Albuminuria, improved  She has preserved kidney function with minimal albuminuria.   She has no microscopic hematuria or evidence of active urinary sediment.  Suspect she sustained some renal injury at an early age as a result of recurrent UTI/pyelonephritis form urinary reflux (that has since been corrected with ureteral reimplantation).  As a result, she likely has some mild scarring resulting in minimal albuminuria.  Renal US performed in 2018 was essentially normal.  Proteinuria improved after increasing lisinopril dose at last visit.      HTN  Goal <130/80 and remains uncontrolled. Patient has " been taking 30 mg per day split into 20 mg morning dose and 10 mg HS. Will prescribe 20 mg pills and discussed taking 20 mg BID. Patient expressed understanding and in agreement with plan.    Hyponatremia, asymptomatic  Possible component of SIADH/reset osmostat which could be related to selective serotonin reuptake inhibitor use (per patient dose was increased at some point in the past couple years) but patient remains asymptomatic and has derived benefit from this therapy. Will continue to monitor as unsure of clinical relevance at this point.     Follow-up in 6 months with repeat labs    Orders Placed This Encounter   Procedures     Basic metabolic panel     Protein  random urine with Creat Ratio     Discussed with Dr Tani Faulkner MD  Renal Fellow  [phone]    HPI: Josy John is a 55 year old female who presents for follow-up. PMH includes HTN, proteinuria, history of recurrent UTI/pyelonephritis when she was 5 yrs old s/p ureteral implantation to prevent suspected ureteral reflux. Since then, she has not had any significant history of UTI. First started on hydrochlorothiazide but then switched to amlodipine and lisinopril. She previously was on 20 mg lisinopril once daily, which caused her to have significant muscle cramping, so this was decreased to 10 mg once daily but later returned to 20 mg daily. Lisinopril was increased to 30 mg daily at last visit.    Notes BP is running in upper 140s-150s/90s during the morning. Patient also wakes up with a headache in the morning which could be related as sometimes the headaches go away after the patient takes her pill. Notes she cuts her pills to take 20 mg lisinopril in the morning and 10 mg HS after she was instructed to take 30 mg daily. Has not been checking her BP frequently at night.     Notes she goes up the stairs every day at work, and after about 4 flights starts to feel SOB and heavy and fatigued in the legs. Denies leg swelling or cramps,  chest pain, SOB at rest, nausea/vomiting, abdominal pain, changes in urine including dysuria, hematuria and change in frequency. She has been walking consistently every day and trying to maintain her weight. Appetite is good and diet healthy, eats home cooked food and avoids salt.    Allergies   Allergen Reactions     Zoloft      extreme fatigue and nausea          Cholecalciferol (VITAMIN D3 PO), Take 1,000 Units by mouth daily       citalopram (CELEXA) 20 MG tablet, Take 1 tablet (20 mg) by mouth daily       lisinopril (ZESTRIL) 30 MG tablet, Take 1 tablet (30 mg) by mouth daily       magnesium 250 MG tablet, Take 1 tablet by mouth daily       omega 3 1000 MG CAPS, Take 1 g by mouth daily    No current facility-administered medications on file prior to visit.       Past Medical History:   Diagnosis Date     Anxiety state, unspecified      Cataract 2014    Left eye     Esophageal reflux      Female stress incontinence 2004     Hypertension      Labyrinthitis, unspecified      LIPOMA SKIN NEC 11/22/2005    right axilla      Menorrhagia 3/14/2013     Other forms of migraine, without mention of intractable migraine without mention of status migrainosus 1998    global, vomiting     Unspecified essential hypertension 2004       Past Surgical History:   Procedure Laterality Date     COLONOSCOPY N/A 11/10/2014    Procedure: COLONOSCOPY;  Surgeon: Sherrie Ghosh MD;  Location:  GI     DILATION AND CURETTAGE, ABLATE ENDOMETRIUM THERMACHOICE, COMBINED N/A 12/22/2014    Procedure: COMBINED DILATION AND CURETTAGE, ABLATE ENDOMETRIUM THERMACHOICE;  Surgeon: Sneha Wheat MD;  Location: [facility]     GI SURGERY      ureter implant as a child     HC COLP CERVIX/UPPER VAGINA  1996    after stillbirth     HEAD & NECK SURGERY      wisdom teeth removed     KERATOTOMY ARCUATE WITH FEMTOSECOND LASER/IMAGING FOR ATIOL Left 11/11/2015    Procedure: KERATOTOMY ARCUATE WITH FEMTOSECOND LASER/IMAGING FOR ATIOL;  Surgeon: Riley Salazar  MD LAUREN;  Location:  EC     KERATOTOMY ARCUATE WITH FEMTOSECOND LASER/IMAGING FOR ATIOL Right 11/16/2016    Procedure: KERATOTOMY ARCUATE WITH FEMTOSECOND LASER/IMAGING FOR ATIOL;  Surgeon: Riley Salazar MD;  Location:  [facility]     OPERATIVE HYSTEROSCOPY WITH MORCELLATOR N/A 12/22/2014    Procedure: OPERATIVE HYSTEROSCOPY WITH MORCELLATOR (LUNDBERG & NEPHEW);  Surgeon: Sneha Wheat MD;  Location:  [facility]     PHACOEMULSIFICATION CLEAR CORNEA WITH TORIC INTRAOCULAR LENS IMPLANT Left 11/11/2015    Procedure: PHACOEMULSIFICATION CLEAR CORNEA WITH TORIC INTRAOCULAR LENS IMPLANT;  Surgeon: Riley Salazar MD;  Location:  [facility]     PHACOEMULSIFICATION CLEAR CORNEA WITH TORIC INTRAOCULAR LENS IMPLANT Right 11/16/2016    Procedure: PHACOEMULSIFICATION CLEAR CORNEA WITH TORIC INTRAOCULAR LENS IMPLANT;  Surgeon: Riley Salazar MD;  Location:  [facility]     ZZC NONSPECIFIC PROCEDURE      reimplantation of ureters age 5       Social History     Tobacco Use     Smoking status: Never Smoker     Smokeless tobacco: Never Used   Substance Use Topics     Alcohol use: Yes     Comment: rare     Drug use: No       Family History   Problem Relation Age of Onset     Hypertension Mother      Thyroid Disease Mother         goiter     Lipids Mother         70s     Hypertension Father      Thyroid Disease Father         hypothyroidism     Hypertension Brother      Connective Tissue Disorder Child         spondyloarthropathy       ROS: A 12 system review of systems was negative other than noted here or above.     Exam:  Deferred due to phone visit    Results:    No visits with results within 1 Day(s) from this visit.   Latest known visit with results is:   Orders Only on 10/05/2020   Component Date Value Ref Range Status     Color Urine 10/05/2020 Yellow   Final     Appearance Urine 10/05/2020 Clear   Final     Glucose Urine 10/05/2020 Negative  NEG^Negative mg/dL Final     Bilirubin Urine 10/05/2020 Negative  NEG^Negative Final     Ketones Urine  10/05/2020 Negative  NEG^Negative mg/dL Final     Specific Gravity Urine 10/05/2020 1.020  1.003 - 1.035 Final     pH Urine 10/05/2020 7.5* 5.0 - 7.0 pH Final     Protein Albumin Urine 10/05/2020 30* NEG^Negative mg/dL Final     Urobilinogen Urine 10/05/2020 0.2  0.2 - 1.0 EU/dL Final     Nitrite Urine 10/05/2020 Negative  NEG^Negative Final     Blood Urine 10/05/2020 Trace* NEG^Negative Final     Leukocyte Esterase Urine 10/05/2020 Negative  NEG^Negative Final     Source 10/05/2020 Midstream Urine   Final     WBC Urine 10/05/2020 0 - 5  OTO5^0 - 5 /HPF Final     RBC Urine 10/05/2020 O - 2  OTO2^O - 2 /HPF Final     Sodium Urine mmol/L 10/05/2020 43  mmol/L Final     25 OH Vit D2 10/05/2020 <5  ug/L Final     25 OH Vit D3 10/05/2020 32  ug/L Final     25 OH Vit D total 10/05/2020 <37  20 - 75 ug/L Final    Comment: Season, race, dietary intake, and treatment affect the concentration of   25-hydroxy-Vitamin D. Values may decrease during winter months and increase   during summer months. Values 20-29 ug/L may indicate Vitamin D insufficiency   and values <20 ug/L may indicate Vitamin D deficiency.  This test was developed and its performance characteristics determined by the   [facility].   It has not been cleared or approved by the FDA. The laboratory is regulated   under CLIA as qualified to perform high-complexity testing. This test is used   for clinical purposes. It should not be regarded as investigational or for   research.       Urine Osmolality 10/05/2020 492  100 - 1,200 mmol/kg Final     Protein Random Urine 10/05/2020 0.28  g/L Final     Protein Total Urine g/gr Creatinine 10/05/2020 0.29* 0 - 0.2 g/g Cr Final     WBC 10/05/2020 5.5  4.0 - 11.0 10e9/L Final     RBC Count 10/05/2020 4.26  3.8 - 5.2 10e12/L Final     Hemoglobin 10/05/2020 13.3  11.7 - 15.7 g/dL Final     Hematocrit 10/05/2020 39.3  35.0 - 47.0 % Final     MCV 10/05/2020 92   78 - 100 fl Final     MCH 10/05/2020 31.2  26.5 - 33.0 pg Final     MCHC 10/05/2020 33.8  31.5 - 36.5 g/dL Final     RDW 10/05/2020 12.1  10.0 - 15.0 % Final     Platelet Count 10/05/2020 316  150 - 450 10e9/L Final     Sodium 10/05/2020 131* 133 - 144 mmol/L Final     Potassium 10/05/2020 4.2  3.4 - 5.3 mmol/L Final     Chloride 10/05/2020 99  94 - 109 mmol/L Final     Carbon Dioxide 10/05/2020 26  20 - 32 mmol/L Final     Anion Gap 10/05/2020 6  3 - 14 mmol/L Final     Glucose 10/05/2020 80  70 - 99 mg/dL Final     Urea Nitrogen 10/05/2020 14  7 - 30 mg/dL Final     Creatinine 10/05/2020 0.76  0.52 - 1.04 mg/dL Final     GFR Estimate 10/05/2020 88  >60 mL/min/[1.73_m2] Final    Comment: Non  GFR Calc  Starting 12/18/2018, serum creatinine based estimated GFR (eGFR) will be   calculated using the Chronic Kidney Disease Epidemiology Collaboration   (CKD-EPI) equation.       GFR Estimate If Black 10/05/2020 >90  >60 mL/min/[1.73_m2] Final    Comment:  GFR Calc  Starting 12/18/2018, serum creatinine based estimated GFR (eGFR) will be   calculated using the Chronic Kidney Disease Epidemiology Collaboration   (CKD-EPI) equation.       Calcium 10/05/2020 9.3  8.5 - 10.1 mg/dL Final     Phosphorus 10/05/2020 3.7  2.5 - 4.5 mg/dL Final     Albumin 10/05/2020 4.1  3.4 - 5.0 g/dL Final

## 2020-12-29 ENCOUNTER — HOSPITAL ENCOUNTER (OUTPATIENT)
Dept: MAMMOGRAPHY | Facility: CLINIC | Age: 55
Discharge: HOME | End: 2020-12-29
Attending: PEDIATRICS | Admitting: NURSE PRACTITIONER
Payer: COMMERCIAL

## 2020-12-29 DIAGNOSIS — Z12.31 ENCOUNTER FOR SCREENING MAMMOGRAM FOR BREAST CANCER: ICD-10-CM

## 2020-12-29 PROCEDURE — 77067 SCR MAMMO BI INCL CAD: CPT

## 2021-01-15 ENCOUNTER — HEALTH MAINTENANCE LETTER (OUTPATIENT)
Age: 56
End: 2021-01-15

## 2021-06-03 ENCOUNTER — E-VISIT (OUTPATIENT)
Dept: URGENT CARE | Facility: CLINIC | Age: 56
End: 2021-06-03
Payer: COMMERCIAL

## 2021-06-03 DIAGNOSIS — J02.9 SORE THROAT: ICD-10-CM

## 2021-06-03 DIAGNOSIS — Z20.822 SUSPECTED COVID-19 VIRUS INFECTION: ICD-10-CM

## 2021-06-03 LAB
LABORATORY COMMENT REPORT: NORMAL
SARS-COV-2 RNA RESP QL NAA+PROBE: NEGATIVE
SARS-COV-2 RNA RESP QL NAA+PROBE: NORMAL
SPECIMEN SOURCE: NORMAL
SPECIMEN SOURCE: NORMAL

## 2021-06-03 PROCEDURE — U0003 INFECTIOUS AGENT DETECTION BY NUCLEIC ACID (DNA OR RNA); SEVERE ACUTE RESPIRATORY SYNDROME CORONAVIRUS 2 (SARS-COV-2) (CORONAVIRUS DISEASE [COVID-19]), AMPLIFIED PROBE TECHNIQUE, MAKING USE OF HIGH THROUGHPUT TECHNOLOGIES AS DESCRIBED BY CMS-2020-01-R: HCPCS | Performed by: PHYSICIAN ASSISTANT

## 2021-06-03 PROCEDURE — 99421 OL DIG E/M SVC 5-10 MIN: CPT | Performed by: PHYSICIAN ASSISTANT

## 2021-06-03 PROCEDURE — U0005 INFEC AGEN DETEC AMPLI PROBE: HCPCS | Performed by: PHYSICIAN ASSISTANT

## 2021-06-03 NOTE — PATIENT INSTRUCTIONS
Dear Josy John,    Your symptoms show that you may have coronavirus (COVID-19). This illness can cause fever, cough and trouble breathing. Many people get a mild case and get better on their own. Some people can get very sick.    Because you also reported sore throat I would like to also test you for Strep Throat to determine if we need to treat you for that as well.    What should I do?  We would like to test you for Covid-19 virus and Strep Throat. I have placed orders for these tests.     For all employees or close contacts (except [employer] and [employer] - see below), go to your [employer] home page and scroll down to the section that says  You have an appointment that needs to be scheduled  and click the large green button that says  Schedule Now  and follow the steps to find the next available opening.  It is important that when you are asked what the reason for your appointment is that you mention you need BOTH Covid and Strep tests.  tests.     If you are unable to complete these steps or if you cannot find any available times, please call [phone] to schedule employee testing.     [employer] [facility] employees or close contacts, please call [phone].   [place] ([employer]) employees or close contacts call [phone].    Return to work/school/ guidance:  Please let your workplace manager and staffing office know when your quarantine ends     We can t give you an exact date as it depends on the above. You can calculate this on your own or work with your manager/staffing office to calculate this. (For example if you were exposed on 10/4, you would have to quarantine for 14 full days. That would be through 10/18. You could return on 10/19.)      If you receive a positive COVID-19 test result, follow the guidance of the those who are giving you the results. Usually the return to work is 10 (or in some cases 20 days from symptom onset.) If you work at [employer], you must also be cleared by Employee  Occupational Health and Safety to return to work.        If you receive a negative COVID-19 test result and did not have a high risk exposure to someone with a known positive COVID-19 test, you can return to work once you're free of fever for 24 hours without fever-reducing medication and your symptoms are improving or resolved.      If you receive a negative COVID-19 test and If you had a high risk exposure to someone who has tested positive for COVID-19 then you can return to work 14 days after your last contact with the positive individual    Note: If you have ongoing exposure to the covid positive person, this quarantine period may be more than 14 days. (For example, if you are continued to be exposed to your child who tested positive and cannot isolate from them, then the quarantine of 7-14 days can't start until your child is no longer contagious. This is typically 10 days from onset of the child's symptoms. So the total duration may be 17-24 days in this case.)    Sign up for [email or web address].   We know it's scary to hear that you might have COVID-19. We want to track your symptoms to make sure you're okay over the next 2 weeks. Please look for an email from [email or web address]--this is a free, online program that we'll use to keep in touch. To sign up, follow the link in the email you will receive. Learn more at [email or web address]    How can I take care of myself?    Get lots of rest. Drink extra fluids (unless a doctor has told you not to)    Take Tylenol (acetaminophen) or ibuprofen for fever or pain. If you have liver or kidney problems, ask your family doctor if it's okay to take Tylenol o ibuprofen    If you have other health problems (like cancer, heart failure, an organ transplant or severe kidney disease): Call your specialty clinic if you don't feel better in the next 2 days.    Know when to call 911. Emergency warning signs include:  o Trouble breathing or shortness of breath  o Pain or  pressure in the chest that doesn't go away  o Feeling confused like you haven't felt before, or not being able to wake up  o Bluish-colored lips or face    Where can I get more information?  [facility] - About COVID-19:   [email or web address]    CDC - What to Do If You're Sick:   www.cdc.gov/coronavirus/2019-ncov/about/steps-when-sick.html

## 2021-06-07 ENCOUNTER — NURSE TRIAGE (OUTPATIENT)
Dept: NURSING | Facility: CLINIC | Age: 56
End: 2021-06-07

## 2021-06-07 NOTE — TELEPHONE ENCOUNTER
Pt is calling.    Not feeling well x 8 days now.  Sore throat and fatigue on and off. Had an E-viist on 06/03/2021. COVID test Thursday was negative. Never did the strep test. Doesn't think that that is what is going on. Her daughter has similar symptoms.  Symptoms worsen as the day goes on.  Sore throat worsens and she feels exhausted. Some post nasal drip.   Denies fever, cough, or body aches. Symptoms are different than her usual symptoms.  Care advice reviewed. I advised her to do the strep test just to be sure. If fatigue continues and strep is negative, then schedule an appointment to be seen.  She verbalized understanding.    Reason for Disposition    Sore throat with cough/cold symptoms present > 5 days    Additional Information    Negative: Severe difficulty breathing (e.g., struggling for each breath, speaks in single words)    Negative: Sounds like a life-threatening emergency to the triager    Negative: Throat culture results, call about    Negative: Productive cough is the main symptom    Negative: Runny nose is the main symptom    Negative: Drooling or spitting out saliva (because can't swallow)    Negative: Unable to open mouth completely    Negative: Drinking very little and has signs of dehydration (e.g., no urine > 12 hours, very dry mouth, very lightheaded)    Negative: Patient sounds very sick or weak to the triager    Negative: Difficulty breathing (per caller) but not severe    Negative: Fever > 103 F (39.4 C)    Negative: Refuses to drink anything for > 12 hours    Negative: SEVERE sore throat pain    Negative: Pus on tonsils (back of throat) and swollen neck lymph nodes ('glands')    Negative: Earache also present    Negative: Widespread rash (especially chest and abdomen)    Negative: Diabetes mellitus or weak immune system (e.g., HIV positive, cancer chemo, splenectomy, organ transplant, chronic steroids)    Negative: History of rheumatic fever    Negative: Patient wants to be seen     Negative: Fever present > 3 days (72 hours)    Negative: Patient requesting a strep throat test    Negative: Strep exposure within last 10 days    Negative: Sore throat is the main symptom and persists > 48 hours    Protocols used: SORE THROAT-A-[place]    Kelli Schulz RN  [facility] Nurse Advisor  6/7/2021 at 2:29 PM

## 2021-06-08 ENCOUNTER — OFFICE VISIT (OUTPATIENT)
Dept: PEDIATRICS | Facility: CLINIC | Age: 56
End: 2021-06-08
Payer: COMMERCIAL

## 2021-06-08 VITALS
BODY MASS INDEX: 26.14 KG/M2 | TEMPERATURE: 98.4 F | SYSTOLIC BLOOD PRESSURE: 136 MMHG | HEART RATE: 66 BPM | RESPIRATION RATE: 16 BRPM | WEIGHT: 177 LBS | DIASTOLIC BLOOD PRESSURE: 78 MMHG | OXYGEN SATURATION: 98 %

## 2021-06-08 DIAGNOSIS — R80.1 PERSISTENT PROTEINURIA: ICD-10-CM

## 2021-06-08 DIAGNOSIS — J02.9 PHARYNGITIS, UNSPECIFIED ETIOLOGY: ICD-10-CM

## 2021-06-08 DIAGNOSIS — R53.83 FATIGUE, UNSPECIFIED TYPE: Primary | ICD-10-CM

## 2021-06-08 LAB
BASOPHILS # BLD AUTO: 0 10E9/L (ref 0–0.2)
BASOPHILS NFR BLD AUTO: 0.4 %
DEPRECATED S PYO AG THROAT QL EIA: NEGATIVE
DIFFERENTIAL METHOD BLD: NORMAL
EOSINOPHIL # BLD AUTO: 0.2 10E9/L (ref 0–0.7)
EOSINOPHIL NFR BLD AUTO: 3 %
ERYTHROCYTE [DISTWIDTH] IN BLOOD BY AUTOMATED COUNT: 11.6 % (ref 10–15)
ERYTHROCYTE [SEDIMENTATION RATE] IN BLOOD BY WESTERGREN METHOD: 30 MM/H (ref 0–30)
HCT VFR BLD AUTO: 36.5 % (ref 35–47)
HGB BLD-MCNC: 12.4 G/DL (ref 11.7–15.7)
LYMPHOCYTES # BLD AUTO: 2.3 10E9/L (ref 0.8–5.3)
LYMPHOCYTES NFR BLD AUTO: 29.2 %
MCH RBC QN AUTO: 31.3 PG (ref 26.5–33)
MCHC RBC AUTO-ENTMCNC: 34 G/DL (ref 31.5–36.5)
MCV RBC AUTO: 92 FL (ref 78–100)
MONOCYTES # BLD AUTO: 0.9 10E9/L (ref 0–1.3)
MONOCYTES NFR BLD AUTO: 11.3 %
NEUTROPHILS # BLD AUTO: 4.5 10E9/L (ref 1.6–8.3)
NEUTROPHILS NFR BLD AUTO: 56.1 %
PLATELET # BLD AUTO: 336 10E9/L (ref 150–450)
RBC # BLD AUTO: 3.96 10E12/L (ref 3.8–5.2)
SPECIMEN SOURCE: NORMAL
WBC # BLD AUTO: 8 10E9/L (ref 4–11)

## 2021-06-08 PROCEDURE — 87651 STREP A DNA AMP PROBE: CPT | Performed by: PHYSICIAN ASSISTANT

## 2021-06-08 PROCEDURE — 84156 ASSAY OF PROTEIN URINE: CPT | Performed by: INTERNAL MEDICINE

## 2021-06-08 PROCEDURE — 85652 RBC SED RATE AUTOMATED: CPT | Performed by: INTERNAL MEDICINE

## 2021-06-08 PROCEDURE — 99N1174 PR STATISTIC STREP A RAPID: Performed by: PHYSICIAN ASSISTANT

## 2021-06-08 PROCEDURE — 80076 HEPATIC FUNCTION PANEL: CPT | Performed by: INTERNAL MEDICINE

## 2021-06-08 PROCEDURE — 99214 OFFICE O/P EST MOD 30 MIN: CPT | Performed by: PHYSICIAN ASSISTANT

## 2021-06-08 PROCEDURE — 86618 LYME DISEASE ANTIBODY: CPT | Performed by: INTERNAL MEDICINE

## 2021-06-08 PROCEDURE — 80048 BASIC METABOLIC PNL TOTAL CA: CPT | Performed by: INTERNAL MEDICINE

## 2021-06-08 PROCEDURE — 85025 COMPLETE CBC W/AUTO DIFF WBC: CPT | Performed by: INTERNAL MEDICINE

## 2021-06-08 PROCEDURE — 36415 COLL VENOUS BLD VENIPUNCTURE: CPT | Performed by: INTERNAL MEDICINE

## 2021-06-09 LAB
ALBUMIN SERPL-MCNC: 4.1 G/DL (ref 3.4–5)
ALP SERPL-CCNC: 96 U/L (ref 40–150)
ALT SERPL W P-5'-P-CCNC: 28 U/L (ref 0–50)
ANION GAP SERPL CALCULATED.3IONS-SCNC: 7 MMOL/L (ref 3–14)
AST SERPL W P-5'-P-CCNC: 23 U/L (ref 0–45)
B BURGDOR IGG+IGM SER QL: 0.12 (ref 0–0.89)
BILIRUB DIRECT SERPL-MCNC: <0.1 MG/DL (ref 0–0.2)
BILIRUB SERPL-MCNC: 0.3 MG/DL (ref 0.2–1.3)
BUN SERPL-MCNC: 12 MG/DL (ref 7–30)
CALCIUM SERPL-MCNC: 9.1 MG/DL (ref 8.5–10.1)
CHLORIDE SERPL-SCNC: 97 MMOL/L (ref 94–109)
CO2 SERPL-SCNC: 24 MMOL/L (ref 20–32)
CREAT SERPL-MCNC: 0.59 MG/DL (ref 0.52–1.04)
GFR SERPL CREATININE-BSD FRML MDRD: >90 ML/MIN/{1.73_M2}
GLUCOSE SERPL-MCNC: 82 MG/DL (ref 70–99)
POTASSIUM SERPL-SCNC: 4.3 MMOL/L (ref 3.4–5.3)
PROT SERPL-MCNC: 8.5 G/DL (ref 6.8–8.8)
PROT UR-MCNC: 0.13 G/L
PROT/CREAT 24H UR: 0.62 G/G CR (ref 0–0.2)
SODIUM SERPL-SCNC: 128 MMOL/L (ref 133–144)
SPECIMEN SOURCE: NORMAL
STREP GROUP A PCR: NOT DETECTED

## 2021-06-11 DIAGNOSIS — N18.1 CKD (CHRONIC KIDNEY DISEASE) STAGE 1, GFR 90 ML/MIN OR GREATER: Primary | ICD-10-CM

## 2021-06-21 ENCOUNTER — VIRTUAL VISIT (OUTPATIENT)
Dept: NEPHROLOGY | Facility: CLINIC | Age: 56
End: 2021-06-21
Attending: INTERNAL MEDICINE
Payer: COMMERCIAL

## 2021-06-21 DIAGNOSIS — E87.1 HYPONATREMIA: ICD-10-CM

## 2021-06-21 DIAGNOSIS — I10 BENIGN ESSENTIAL HYPERTENSION: Primary | ICD-10-CM

## 2021-06-21 DIAGNOSIS — R80.1 PERSISTENT PROTEINURIA: ICD-10-CM

## 2021-06-21 PROCEDURE — 84588 ASSAY OF VASOPRESSIN: CPT | Mod: GT | Performed by: INTERNAL MEDICINE

## 2021-06-21 PROCEDURE — 99214 OFFICE O/P EST MOD 30 MIN: CPT | Mod: 95 | Performed by: INTERNAL MEDICINE

## 2021-06-21 PROCEDURE — 83935 ASSAY OF URINE OSMOLALITY: CPT | Mod: GT | Performed by: INTERNAL MEDICINE

## 2021-06-21 ASSESSMENT — PAIN SCALES - GENERAL: PAINLEVEL: NO PAIN (0)

## 2021-06-21 NOTE — LETTER
2021       RE: Josy John  [address] [place] [address]     Dear Colleague,    Thank you for referring your patient, Josy John, to the [facility] [place] at [facility]. Please see a copy of my visit note below.    Josy is a 56 year old who is being evaluated via a billable video visit.      How would you like to obtain your AVS? MyChart  If the video visit is dropped, the invitation should be resent by: Send to e-mail at: [email or web address]  Will anyone else be joining your video visit? No      Video Start Time: 9:50 AM  Video-Visit Details    Type of service:  Video Visit    Video End Time:10:30 AM    Originating Location (pt. Location): Home    Distant Location (provider location):  [facility] [place]     Platform used for Video Visit: [facility]        Nephrology Clinic - Video Visit    Sam Freire MD  2021     Name: Josy John  MRN: [id number]  Age: 55 year old  : 1965  Referring provider: Samantha Mcdonald     Assessment and Plan:     CKD, stage 1/albuminuria: She has preserved kidney function with minimal albuminuria.   She has no microscopic hematuria or evidence of active urinary sediment.  I suspect she sustained some renal injury at an early age as a result of recurrent UTI/pyelonephritis form urinary reflux (that has since been corrected with ureteral reimplantation).  As a result, she likely has some mild scarring resulting in minimal albuminuria (452 mg/g in 2020 which was an increase from 138 in 2018).      - renal ultrasound performed and essentially normal  - continue RAAS blockade for renoprotection/albuminuria and hypertension with lisinopril tat 20 mg BID  - return to clinic in 2 months     Hypertension: Primary. Appears more near goal of at least <130/80.  She reports that amlodipine was not helpful but it was at low dose (2.5 mg).       -continue lisinopril at 20 mg BID    Hyponatermia: Sodium in the low 130's but stable.  Asymptomatic.    -will evaluate with urine studies at her f/u visit in 2 months.  If she becomes frankly hyponatremic then we will see her sooner.      Follow-up: RTC in 2 months     HPI:   Josy John is a 53 year old woman with a history of hypertension (controlled on lisinopril and amlodipine) who was referred for nephrology consult by her PCP due to proteinuria. The patient reports when she was 5 y.o. She had recurrent UTI/pyelonephritis for approximately a year and finally had ureteral implantation to prevent suspected ureteral reflux. Since then, she has not had any significant history of UTI. She states she was diagnosed with hypertension many years ago. She was first started on hydrochlorothiazide but then switched to amlodipine and lisinopril. She previously was on 20 mg lisinopril once daily, which caused her to have significant muscle cramping, so this was decreased to 10 mg once daily but later returned to 20 mg daily.     She was seen by her PCP and noted to have proteinuria and referred to nephrology.  The patient does express concern for why these proteins are showing up in her urine. She notes she had occipital headaches when she wakes up in the morning last year and wonders if this is related to hormone levels or caffeine intake. More recenly she reports having migraines that she thinks may be related to undergoing menopause.  She denies any significant pain medication use, though she does use tylenol during arthritis flares. She also denies any history of kidney stones or diabetes.  She has no family history of kidney disease.  Her lisinopril remains at 20 mg daily and she continues on amlodipine at 2.5 mg dialy.  Her home BP varies and is sometimes in closer to 138/80's more recently.  She continues on a low salt diet and exercises regularly.            Interval History:  Mrs. John is currently in lisinopril  20 mg BID with a home BP of 120//90.  She reports amlodpine din't help.  She also reports was celexa increasd a few years back and she has been on  lisinopril for years. Her daily fluid intake usually is 16 oz + carbonated 12 oz + another 16 oz.  She is feeling well with the exception of occasional fatigue.       Review of Systems:   Pertinent items are noted in HPI or as below, remainder of complete ROS is negative.      Active Medications:   Current Outpatient Medications   Medication Sig Dispense Refill     Cholecalciferol (VITAMIN D3 PO) Take 1,000 Units by mouth daily       citalopram (CELEXA) 20 MG tablet Take 1 tablet (20 mg) by mouth daily 90 tablet 3     lisinopril (ZESTRIL) 20 MG tablet Take 1 tablet (20 mg) by mouth 2 times daily 120 tablet 3     magnesium 250 MG tablet Take 1 tablet by mouth daily       omega 3 1000 MG CAPS Take 1 g by mouth daily 90 capsule         Allergies:   Zoloft      Past Medical History:  Generalized anxiety disorder   Hypertension, controlled  Microalbuminuria  Vitamin D deficiency     Past Surgical History:  Reimplantation of ureters    Family History:   No family history of kidney disease. Family hypertension (mother, brother, and father).      Social History:   The patient has never smoked. Denies alcohol use.     Physical Exam:  Physical exam deferred as encounter ws a video visit.      Laboratory:  CMP  Recent Labs   Lab Test 06/08/21  1652 10/05/20  0822 07/02/20  1545 02/29/20  0910 12/27/18  0229 06/20/16  0854 06/20/16  0854 10/09/14  1530 10/09/14  1530   * 131* 131* 130* 133   < > 138   < > 134   POTASSIUM 4.3 4.2 3.9 4.5 4.2   < > 3.9   < > 4.1   CHLORIDE 97 99 99 98 100   < > 104   < > 101   CO2 24 26 29 26 30   < > 27   < > 25   ANIONGAP 7 6 3 6 4   < > 7   < > 8   GLC 82 80 86 98 96   < > 87   < > 91   BUN 12 14 14 14 14   < > 9   < > 14   CR 0.59 0.76 0.68 0.58 0.64   < > 0.71   < > 0.74   GFRESTIMATED >90 88 >90 >90 >90   < > 86   < > 83    GFRESTBLACK >90 >90 >90 >90 >90   < > >90  African American GFR Calc     < > >90   GFR Calc     KRIS 9.1 9.3 9.1 9.1 8.9   < > 8.8   < > 8.9   PHOS  --  3.7  --   --  4.2  --   --   --   --    PROTTOTAL 8.5  --   --  8.7  --   --  8.4  --  8.2   ALBUMIN 4.1 4.1  --  4.1 3.9  --  4.0  --  3.8*   BILITOTAL 0.3  --   --  0.4  --   --  0.4  --  0.2   ALKPHOS 96  --   --  87  --   --  68  --  98   AST 23  --   --  25  --   --  22  --  21   ALT 28  --   --  32  --   --  24  --  27    < > = values in this interval not displayed.     CBC  Recent Labs   Lab Test 06/08/21  1652 10/05/20  0822 02/29/20  0910 12/27/18  0229   HGB 12.4 13.3 13.7 12.5   WBC 8.0 5.5 5.8 6.8   RBC 3.96 4.26 4.43 4.03   HCT 36.5 39.3 40.3 37.5   MCV 92 92 91 93   MCH 31.3 31.2 30.9 31.0   MCHC 34.0 33.8 34.0 33.3   RDW 11.6 12.1 12.1 11.9    316 328 304       URINE STUDIES  Recent Labs   Lab Test 10/05/20  0823 12/27/18  1433 12/22/17  1521   COLOR Yellow Straw Yellow   APPEARANCE Clear Clear Clear   URINEGLC Negative Negative Negative   URINEBILI Negative Negative Negative   URINEKETONE Negative Negative 15*   SG 1.020 1.008 1.015   UBLD Trace* Negative Small*   URINEPH 7.5* 7.0 6.5   PROTEIN 30* Negative Negative   UROBILINOGEN 0.2  --  0.2   NITRITE Negative Negative Negative   LEUKEST Negative Negative Negative   RBCU O - 2 1 O - 2   WBCU 0 - 5 <1 O - 2     Recent Labs   Lab Test 06/08/21  1652 10/05/20  0823 07/02/20  1546 12/27/18  1433   UTPG 0.62* 0.29* 0.58* 0.38*     PTH  No lab results found.  IRON STUDIES   Recent Labs   Lab Test 06/17/15  1550 10/28/14  1453 10/09/14  1530   IRON 80  --   --      --   --    IRONSAT 20  --   --    YUN 12 18 3*      All above labs were reviewed by me.   Sam Freire MD

## 2021-06-21 NOTE — PROGRESS NOTES
Nephrology Clinic - Video Visit    Sam Freire MD  2021     Name: Josy John  MRN: [id number]  Age: 55 year old  : 1965  Referring provider: Samantha Mcdonald     Assessment and Plan:     CKD, stage 1/albuminuria: She has preserved kidney function with minimal albuminuria.   She has no microscopic hematuria or evidence of active urinary sediment.  I suspect she sustained some renal injury at an early age as a result of recurrent UTI/pyelonephritis form urinary reflux (that has since been corrected with ureteral reimplantation).  As a result, she likely has some mild scarring resulting in minimal albuminuria (452 mg/g in 2020 which was an increase from 138 in 2018).      - renal ultrasound performed and essentially normal  - continue RAAS blockade for renoprotection/albuminuria and hypertension with lisinopril tat 20 mg BID  - return to clinic in 2 months     Hypertension: Primary. Appears more near goal of at least <130/80.  She reports that amlodipine was not helpful but it was at low dose (2.5 mg).      -continue lisinopril at 20 mg BID    Hyponatermia: Sodium in the low 130's but stable.  Asymptomatic.    -will evaluate with urine studies at her f/u visit in 2 months.  If she becomes frankly hyponatremic then we will see her sooner.      Follow-up: RTC in 2 months     HPI:   Josy John is a 53 year old woman with a history of hypertension (controlled on lisinopril and amlodipine) who was referred for nephrology consult by her PCP due to proteinuria. The patient reports when she was 5 y.o. She had recurrent UTI/pyelonephritis for approximately a year and finally had ureteral implantation to prevent suspected ureteral reflux. Since then, she has not had any significant history of UTI. She states she was diagnosed with hypertension many years ago. She was first started on hydrochlorothiazide but then switched to amlodipine and lisinopril. She previously was on 20 mg  lisinopril once daily, which caused her to have significant muscle cramping, so this was decreased to 10 mg once daily but later returned to 20 mg daily.     She was seen by her PCP and noted to have proteinuria and referred to nephrology.  The patient does express concern for why these proteins are showing up in her urine. She notes she had occipital headaches when she wakes up in the morning last year and wonders if this is related to hormone levels or caffeine intake. More recenly she reports having migraines that she thinks may be related to undergoing menopause.  She denies any significant pain medication use, though she does use tylenol during arthritis flares. She also denies any history of kidney stones or diabetes.  She has no family history of kidney disease.  Her lisinopril remains at 20 mg daily and she continues on amlodipine at 2.5 mg dialy.  Her home BP varies and is sometimes in closer to 138/80's more recently.  She continues on a low salt diet and exercises regularly.            Interval History:  Mrs. John is currently in lisinopril 20 mg BID with a home BP of 120//90.  She reports amlodpine din't help.  She also reports was celexa increasd a few years back and she has been on  lisinopril for years. Her daily fluid intake usually is 16 oz + carbonated 12 oz + another 16 oz.  She is feeling well with the exception of occasional fatigue.       Review of Systems:   Pertinent items are noted in HPI or as below, remainder of complete ROS is negative.      Active Medications:   Current Outpatient Medications   Medication Sig Dispense Refill     Cholecalciferol (VITAMIN D3 PO) Take 1,000 Units by mouth daily       citalopram (CELEXA) 20 MG tablet Take 1 tablet (20 mg) by mouth daily 90 tablet 3     lisinopril (ZESTRIL) 20 MG tablet Take 1 tablet (20 mg) by mouth 2 times daily 120 tablet 3     magnesium 250 MG tablet Take 1 tablet by mouth daily       omega 3 1000 MG CAPS Take 1 g by mouth daily 90  capsule         Allergies:   Zoloft      Past Medical History:  Generalized anxiety disorder   Hypertension, controlled  Microalbuminuria  Vitamin D deficiency     Past Surgical History:  Reimplantation of ureters    Family History:   No family history of kidney disease. Family hypertension (mother, brother, and father).      Social History:   The patient has never smoked. Denies alcohol use.     Physical Exam:  Physical exam deferred as encounter ws a video visit.      Laboratory:  CMP  Recent Labs   Lab Test 06/08/21  1652 10/05/20  0822 07/02/20  1545 02/29/20  0910 12/27/18  0229 06/20/16  0854 06/20/16  0854 10/09/14  1530 10/09/14  1530   * 131* 131* 130* 133   < > 138   < > 134   POTASSIUM 4.3 4.2 3.9 4.5 4.2   < > 3.9   < > 4.1   CHLORIDE 97 99 99 98 100   < > 104   < > 101   CO2 24 26 29 26 30   < > 27   < > 25   ANIONGAP 7 6 3 6 4   < > 7   < > 8   GLC 82 80 86 98 96   < > 87   < > 91   BUN 12 14 14 14 14   < > 9   < > 14   CR 0.59 0.76 0.68 0.58 0.64   < > 0.71   < > 0.74   GFRESTIMATED >90 88 >90 >90 >90   < > 86   < > 83   GFRESTBLACK >90 >90 >90 >90 >90   < > >90  African American GFR Calc     < > >90   GFR Calc     KRIS 9.1 9.3 9.1 9.1 8.9   < > 8.8   < > 8.9   PHOS  --  3.7  --   --  4.2  --   --   --   --    PROTTOTAL 8.5  --   --  8.7  --   --  8.4  --  8.2   ALBUMIN 4.1 4.1  --  4.1 3.9  --  4.0  --  3.8*   BILITOTAL 0.3  --   --  0.4  --   --  0.4  --  0.2   ALKPHOS 96  --   --  87  --   --  68  --  98   AST 23  --   --  25  --   --  22  --  21   ALT 28  --   --  32  --   --  24  --  27    < > = values in this interval not displayed.     CBC  Recent Labs   Lab Test 06/08/21  1652 10/05/20  0822 02/29/20  0910 12/27/18  0229   HGB 12.4 13.3 13.7 12.5   WBC 8.0 5.5 5.8 6.8   RBC 3.96 4.26 4.43 4.03   HCT 36.5 39.3 40.3 37.5   MCV 92 92 91 93   MCH 31.3 31.2 30.9 31.0   MCHC 34.0 33.8 34.0 33.3   RDW 11.6 12.1 12.1 11.9    316 328 304       URINE STUDIES  Recent Labs    Lab Test 10/05/20  0823 12/27/18  1433 12/22/17  1521   COLOR Yellow Straw Yellow   APPEARANCE Clear Clear Clear   URINEGLC Negative Negative Negative   URINEBILI Negative Negative Negative   URINEKETONE Negative Negative 15*   SG 1.020 1.008 1.015   UBLD Trace* Negative Small*   URINEPH 7.5* 7.0 6.5   PROTEIN 30* Negative Negative   UROBILINOGEN 0.2  --  0.2   NITRITE Negative Negative Negative   LEUKEST Negative Negative Negative   RBCU O - 2 1 O - 2   WBCU 0 - 5 <1 O - 2     Recent Labs   Lab Test 06/08/21  1652 10/05/20  0823 07/02/20  1546 12/27/18  1433   UTPG 0.62* 0.29* 0.58* 0.38*     PTH  No lab results found.  IRON STUDIES   Recent Labs   Lab Test 06/17/15  1550 10/28/14  1453 10/09/14  1530   IRON 80  --   --      --   --    IRONSAT 20  --   --    YUN 12 18 3*      All above labs were reviewed by me.   Sam Freire MD

## 2021-06-21 NOTE — PROGRESS NOTES
Josy is a 56 year old who is being evaluated via a billable video visit.      How would you like to obtain your AVS? MyChart  If the video visit is dropped, the invitation should be resent by: Send to e-mail at: [email or web address]  Will anyone else be joining your video visit? No      Video Start Time: 9:50 AM  Video-Visit Details    Type of service:  Video Visit    Video End Time:10:30 AM    Originating Location (pt. Location): Home    Distant Location (provider location):  [facility] [place]     Platform used for Video Visit: [employer]

## 2021-07-12 ENCOUNTER — HOSPITAL ENCOUNTER (OUTPATIENT)
Dept: CARDIOLOGY | Facility: CLINIC | Age: 56
Discharge: HOME | End: 2021-07-12
Attending: INTERNAL MEDICINE | Admitting: INTERNAL MEDICINE
Payer: COMMERCIAL

## 2021-07-12 DIAGNOSIS — I10 BENIGN ESSENTIAL HYPERTENSION: ICD-10-CM

## 2021-07-12 PROCEDURE — 93788 AMBL BP MNTR W/SW A/R: CPT

## 2021-07-12 PROCEDURE — 93790 AMBL BP MNTR W/SW I&R: CPT | Performed by: INTERNAL MEDICINE

## 2021-07-15 DIAGNOSIS — F41.1 GENERALIZED ANXIETY DISORDER: ICD-10-CM

## 2021-07-16 RX ORDER — CITALOPRAM HYDROBROMIDE 20 MG/1
TABLET ORAL
Qty: 90 TABLET | Refills: 3 | Status: SHIPPED | OUTPATIENT
Start: 2021-07-16 | End: 2022-07-07

## 2021-07-16 NOTE — TELEPHONE ENCOUNTER
Routing refill request to provider for review/approval because:  Drug interaction warning    Mora Barriga RN on 7/16/2021 at 11:34 AM

## 2021-07-19 ENCOUNTER — APPOINTMENT (OUTPATIENT)
Dept: CT IMAGING | Facility: CLINIC | Age: 56
End: 2021-07-19
Attending: PHYSICIAN ASSISTANT
Payer: COMMERCIAL

## 2021-07-19 ENCOUNTER — HOSPITAL ENCOUNTER (EMERGENCY)
Facility: CLINIC | Age: 56
Discharge: HOME | End: 2021-07-19
Attending: PHYSICIAN ASSISTANT | Admitting: PHYSICIAN ASSISTANT
Payer: COMMERCIAL

## 2021-07-19 ENCOUNTER — NURSE TRIAGE (OUTPATIENT)
Dept: PEDIATRICS | Facility: CLINIC | Age: 56
End: 2021-07-19

## 2021-07-19 ENCOUNTER — MYC MEDICAL ADVICE (OUTPATIENT)
Dept: PEDIATRICS | Facility: CLINIC | Age: 56
End: 2021-07-19

## 2021-07-19 VITALS
SYSTOLIC BLOOD PRESSURE: 130 MMHG | DIASTOLIC BLOOD PRESSURE: 95 MMHG | BODY MASS INDEX: 26.51 KG/M2 | HEART RATE: 67 BPM | RESPIRATION RATE: 12 BRPM | OXYGEN SATURATION: 97 % | TEMPERATURE: 98.1 F | HEIGHT: 69 IN | WEIGHT: 179 LBS

## 2021-07-19 DIAGNOSIS — R51.9 HEADACHE: ICD-10-CM

## 2021-07-19 DIAGNOSIS — I10 HTN (HYPERTENSION): ICD-10-CM

## 2021-07-19 LAB
ALBUMIN SERPL-MCNC: 4 G/DL (ref 3.4–5)
ALP SERPL-CCNC: 93 U/L (ref 40–150)
ALT SERPL W P-5'-P-CCNC: 24 U/L (ref 0–50)
ANION GAP SERPL CALCULATED.3IONS-SCNC: 8 MMOL/L (ref 3–14)
AST SERPL W P-5'-P-CCNC: 23 U/L (ref 0–45)
ATRIAL RATE - MUSE: 64 BPM
BASOPHILS # BLD AUTO: 0.1 10E3/UL (ref 0–0.2)
BASOPHILS NFR BLD AUTO: 1 %
BILIRUB SERPL-MCNC: 0.3 MG/DL (ref 0.2–1.3)
BUN SERPL-MCNC: 11 MG/DL (ref 7–30)
CALCIUM SERPL-MCNC: 9.1 MG/DL (ref 8.5–10.1)
CHLORIDE BLD-SCNC: 99 MMOL/L (ref 94–109)
CO2 SERPL-SCNC: 25 MMOL/L (ref 20–32)
CREAT SERPL-MCNC: 0.67 MG/DL (ref 0.52–1.04)
DIASTOLIC BLOOD PRESSURE - MUSE: NORMAL MMHG
EOSINOPHIL # BLD AUTO: 0.2 10E3/UL (ref 0–0.7)
EOSINOPHIL NFR BLD AUTO: 3 %
ERYTHROCYTE [DISTWIDTH] IN BLOOD BY AUTOMATED COUNT: 11.8 % (ref 10–15)
GFR SERPL CREATININE-BSD FRML MDRD: >90 ML/MIN/1.73M2
GLUCOSE BLD-MCNC: 94 MG/DL (ref 70–99)
HCT VFR BLD AUTO: 36 % (ref 35–47)
HGB BLD-MCNC: 12.3 G/DL (ref 11.7–15.7)
HOLD SPECIMEN: NORMAL
IMM GRANULOCYTES # BLD: 0 10E3/UL
IMM GRANULOCYTES NFR BLD: 1 %
INTERPRETATION ECG - MUSE: NORMAL
LYMPHOCYTES # BLD AUTO: 2.1 10E3/UL (ref 0.8–5.3)
LYMPHOCYTES NFR BLD AUTO: 32 %
MCH RBC QN AUTO: 30.8 PG (ref 26.5–33)
MCHC RBC AUTO-ENTMCNC: 34.2 G/DL (ref 31.5–36.5)
MCV RBC AUTO: 90 FL (ref 78–100)
MONOCYTES # BLD AUTO: 0.7 10E3/UL (ref 0–1.3)
MONOCYTES NFR BLD AUTO: 11 %
NEUTROPHILS # BLD AUTO: 3.5 10E3/UL (ref 1.6–8.3)
NEUTROPHILS NFR BLD AUTO: 52 %
NRBC # BLD AUTO: 0 10E3/UL
NRBC BLD AUTO-RTO: 0 /100
P AXIS - MUSE: 60 DEGREES
PLATELET # BLD AUTO: 341 10E3/UL (ref 150–450)
POTASSIUM BLD-SCNC: 3.7 MMOL/L (ref 3.4–5.3)
PR INTERVAL - MUSE: 180 MS
PROT SERPL-MCNC: 8.2 G/DL (ref 6.8–8.8)
QRS DURATION - MUSE: 88 MS
QT - MUSE: 422 MS
QTC - MUSE: 435 MS
R AXIS - MUSE: 25 DEGREES
RBC # BLD AUTO: 3.99 10E6/UL (ref 3.8–5.2)
SODIUM SERPL-SCNC: 132 MMOL/L (ref 133–144)
SYSTOLIC BLOOD PRESSURE - MUSE: NORMAL MMHG
T AXIS - MUSE: 63 DEGREES
VENTRICULAR RATE- MUSE: 64 BPM
WBC # BLD AUTO: 6.7 10E3/UL (ref 4–11)

## 2021-07-19 PROCEDURE — 250N000009 HC RX 250: Performed by: PHYSICIAN ASSISTANT

## 2021-07-19 PROCEDURE — 82040 ASSAY OF SERUM ALBUMIN: CPT | Performed by: PHYSICIAN ASSISTANT

## 2021-07-19 PROCEDURE — 70496 CT ANGIOGRAPHY HEAD: CPT

## 2021-07-19 PROCEDURE — 70450 CT HEAD/BRAIN W/O DYE: CPT

## 2021-07-19 PROCEDURE — 250N000011 HC RX IP 250 OP 636: Performed by: PHYSICIAN ASSISTANT

## 2021-07-19 PROCEDURE — 36415 COLL VENOUS BLD VENIPUNCTURE: CPT | Performed by: PHYSICIAN ASSISTANT

## 2021-07-19 PROCEDURE — 99285 EMERGENCY DEPT VISIT HI MDM: CPT | Mod: 25

## 2021-07-19 PROCEDURE — 93005 ELECTROCARDIOGRAM TRACING: CPT

## 2021-07-19 PROCEDURE — 36592 COLLECT BLOOD FROM PICC: CPT | Performed by: PHYSICIAN ASSISTANT

## 2021-07-19 PROCEDURE — 250N000013 HC RX MED GY IP 250 OP 250 PS 637: Performed by: PHYSICIAN ASSISTANT

## 2021-07-19 PROCEDURE — 85025 COMPLETE CBC W/AUTO DIFF WBC: CPT | Performed by: PHYSICIAN ASSISTANT

## 2021-07-19 RX ORDER — ACETAMINOPHEN 325 MG/1
975 TABLET ORAL ONCE
Status: COMPLETED | OUTPATIENT
Start: 2021-07-19 | End: 2021-07-19

## 2021-07-19 RX ORDER — IOPAMIDOL 755 MG/ML
70 INJECTION, SOLUTION INTRAVASCULAR ONCE
Status: COMPLETED | OUTPATIENT
Start: 2021-07-19 | End: 2021-07-19

## 2021-07-19 RX ADMIN — IOPAMIDOL 70 ML: 755 INJECTION, SOLUTION INTRAVENOUS at 18:09

## 2021-07-19 RX ADMIN — ACETAMINOPHEN 975 MG: 325 TABLET, FILM COATED ORAL at 17:42

## 2021-07-19 RX ADMIN — SODIUM CHLORIDE 90 ML: 9 INJECTION, SOLUTION INTRAVENOUS at 18:09

## 2021-07-19 ASSESSMENT — MIFFLIN-ST. JEOR: SCORE: 1466.32

## 2021-07-19 ASSESSMENT — ENCOUNTER SYMPTOMS
BLOOD IN STOOL: 0
CHILLS: 0
FEVER: 0
NAUSEA: 1
ABDOMINAL PAIN: 0
SHORTNESS OF BREATH: 0
DIARRHEA: 0
HEADACHES: 1

## 2021-07-19 NOTE — TELEPHONE ENCOUNTER
"At 2:30pm patient took her BP and it was 163/109 and took 1 amlodipine 2.5mg tablet. She is not prescribed amlodipine but had an old prescription.     While on the phone requested patient take her BP again and it was 170/101 and 170/104. She reports having a headache as well.    Reason for Disposition    [1] Systolic BP  >= 160 OR Diastolic >= 100 AND [2] cardiac or neurologic symptoms (e.g., chest pain, difficulty breathing, unsteady gait, blurred vision)    Answer Assessment - Initial Assessment Questions  1. BLOOD PRESSURE: \"What is the blood pressure?\" \"Did you take at least two measurements 5 minutes apart?\"      Most recent BP was  141/97 at work today. At home 163/109.   2. ONSET: \"When did you take your blood pressure?\"      Last reading at home was 20min ago.   3. HOW: \"How did you obtain the blood pressure?\" (e.g., visiting nurse, automatic home BP monitor)      Both an automatic upper arm cuff.   4. HISTORY: \"Do you have a history of high blood pressure?\"      Yes.   5. MEDICATIONS: \"Are you taking any medications for blood pressure?\" \"Have you missed any doses recently?\"     Yes, has not missed any doses.   6. OTHER SYMPTOMS: \"Do you have any symptoms?\" (e.g., headache, chest pain, blurred vision, difficulty breathing, weakness)      Has a headache  7. PREGNANCY: \"Is there any chance you are pregnant?\" \"When was your last menstrual period?\"      No    Protocols used: HIGH BLOOD PRESSURE-A-    Mora Barriga RN on 7/19/2021 at 3:10 PM    "

## 2021-07-19 NOTE — ED TRIAGE NOTES
High blood pressure for years.  Recently increased it BP med.  BP has been high for 2 days.  Has a headache.

## 2021-07-19 NOTE — ED PROVIDER NOTES
History   Chief Complaint:  Hypertension     The history is provided by the patient.      Josy John is a 56 year old female with history of HTN, GERD and anxiety who presents with hypertension. The patient reports that she has had hypertension for over 10 years that she takes Zestril for. Previously her hypertension has been in control, but recently she has not been able to keep her blood pressure within control. She notes her Zestril dose was upped last December. For the last few days she has been taking at home readings and her diastolic has been over 100 consistently. She endorses headache as well in the back of her head that has lasted all day and nausea that has resolved. She took a 2.5 mg Amlodipine that did not alleviate any symptoms.  She denies shortness of breath, chest pain, vision changes or leg swelling. She also denies fevers, chills, abdominal pain, urinary symptoms, diarrhea and blood in her stool. She notes she has chronically low sodium right now and is anticipating future workup for it with her nephrologist.    Review of Systems   Constitutional: Negative for chills and fever.   Eyes: Negative for visual disturbance.   Respiratory: Negative for shortness of breath.    Cardiovascular: Negative for chest pain and leg swelling.        HTN   Gastrointestinal: Positive for nausea (Resolved). Negative for abdominal pain, blood in stool and diarrhea.   Genitourinary:        No urinary symptoms   Neurological: Positive for headaches.   All other systems reviewed and are negative.    Allergies:  Zoloft    Medications:  Celexa  Zestril    Past Medical History:    Anxiety   GERD  HTN  Menorrhagia     Past Surgical History:    D & C Ablate endometrium thermachoice  HC Sophia cervix/upper vagina  Keratotomy arcuate with femtosecond x2    Family History:    HTN  Goiter  Hypothyroidism    Social History:  Presents with   Patient is     Physical Exam     Patient Vitals for the past 24 hrs:   BP  "Temp Temp src Pulse Resp SpO2 Height Weight   07/19/21 1900 (!) 130/98 -- -- 65 23 97 % -- --   07/19/21 1845 (!) 140/101 -- -- 65 11 98 % -- --   07/19/21 1815 (!) 156/103 -- -- 66 11 99 % -- --   07/19/21 1745 (!) 152/104 -- -- 64 -- 97 % -- --   07/19/21 1730 (!) 151/110 -- -- 69 -- 99 % -- --   07/19/21 1715 (!) 149/102 -- -- 65 -- 98 % -- --   07/19/21 1700 (!) 144/101 -- -- 67 -- -- -- --   07/19/21 1648 (!) 140/110 98.1  F (36.7  C) Oral 75 16 98 % 1.753 m (5' 9\") 81.2 kg (179 lb)   07/19/21 1645 (!) 140/110 -- -- 76 -- -- -- --       Physical Exam  Constitutional: Pleasant. Cooperative.   Eyes: Pupils equally round and reactive  HENT: Head is normal in appearance. Oropharynx is normal with moist mucus membranes.  Cardiovascular: Regular rate and rhythm and without murmurs.  Respiratory: Normal respiratory effort, lungs are clear bilaterally.  GI: Abdomen is soft, non-tender, non-distended. No guarding, rebound, or rigidity.  Musculoskeletal: No asymmetry of the lower extremities, no tenderness to palpation.   Skin: Normal, without rash.  Neurologic: Cranial nerves grossly intact, normal cognition, no focal deficits. Alert and oriented x 3.   Psychiatric: Normal affect.  Nursing notes and vital signs reviewed.    Emergency Department Course   ECG  ECG taken at 1750, ECG read at 1753  NSR   Rate 64 bpm. MS interval 180 ms. QRS duration 88 ms. QT/QTc 422/435 ms. P-R-T axes 60 25 63.     Imaging:  CTA Head Neck w/ IV contrast:   HEAD CTA:   1.  Negative. No vessel stenosis, occlusion or aneurysm.     NECK CTA:   1.  Negative. No dissection or hemodynamically significant narrowing   in the neck by NASCET criteria, as per radiology.    CT Head w/o IV contrast:   No intracranial hemorrhage, mass, or definite CT evidence of recent   Ischemia, as per radiology.    Laboratory:  CBC: WBC 6.7, HGB 12.3,    CMP: Glucose 94, Sodium: 132 (L), o/w WNL (Creatinine: 0.67)    Emergency Department Course:    Reviewed:  I " reviewed nursing notes, vitals, past medical history and care everywhere    Assessments:  1720 I obtained history and examined the patient as noted above.    I rechecked the patient and explained findings.     Interventions:  174 Tylenol, 975 mg, Oral    Disposition:  The patient was discharged to home.     Impression & Plan   Medical Decision Making:  Josy John is a 56 year old female who presents to the ED for evaluation of headache in the setting of hypertension.  Patient has been dealing with hypertension for the last decade, however it has only been over the last few months that she has struggled to control this.  She has been working with a nephrologist to see if her kidneys are associated with her elevated blood pressures.  She takes lisinopril.  See HPI as above for additional details.  Vitals and physical exam as above.  Full neurologic exam is reassuring.  Patient notes that she has family members who have  from aneurysms and is concerned that this may be driving her headaches.  Imaging obtained as above is reassuring.  No evidence for other end organ damage at this time. Lungs clear to auscultation. Normal creatinine. No elevated LFTs. No chest pain to suggest for ACS. Patient's blood pressure trends down slightly during her stay in the ED.  As she is currently adjusting her blood pressure medications via PCP and nephrologist, we discussed that I will not be addressing this here today.  She was in agreement with that plan.  [place] patient was safe for discharge to home. Discussed reasons to return. All questions answered. Patient discharged to home in stable condition.    Diagnosis:    ICD-10-CM    1. HTN (hypertension)  I10    2. Headache  R51.9        Discharge Medications:  New Prescriptions    No medications on file       Scribe Disclosure:  ADDISON, Nuzhat Thompson, am serving as a scribe at 4:41 PM on 2021 to document services personally performed by Alvaro Birch PA-C based on my  observations and the provider's statements to me.     This record was created at least in part using electronic voice recognition software, so please excuse any typographical errors.       Alvaro Birch PA-C  07/19/21 0362

## 2021-07-21 ENCOUNTER — OFFICE VISIT (OUTPATIENT)
Dept: PEDIATRICS | Facility: CLINIC | Age: 56
End: 2021-07-21
Payer: COMMERCIAL

## 2021-07-21 VITALS
RESPIRATION RATE: 16 BRPM | WEIGHT: 176 LBS | BODY MASS INDEX: 26.07 KG/M2 | DIASTOLIC BLOOD PRESSURE: 76 MMHG | HEART RATE: 69 BPM | OXYGEN SATURATION: 98 % | HEIGHT: 69 IN | SYSTOLIC BLOOD PRESSURE: 126 MMHG | TEMPERATURE: 98.1 F

## 2021-07-21 DIAGNOSIS — I10 BENIGN HYPERTENSION: Primary | ICD-10-CM

## 2021-07-21 DIAGNOSIS — R80.9 MICROALBUMINURIA: ICD-10-CM

## 2021-07-21 DIAGNOSIS — G44.219 EPISODIC TENSION-TYPE HEADACHE, NOT INTRACTABLE: ICD-10-CM

## 2021-07-21 DIAGNOSIS — N18.1 CKD (CHRONIC KIDNEY DISEASE) STAGE 1, GFR 90 ML/MIN OR GREATER: ICD-10-CM

## 2021-07-21 PROCEDURE — 99214 OFFICE O/P EST MOD 30 MIN: CPT | Performed by: NURSE PRACTITIONER

## 2021-07-21 ASSESSMENT — MIFFLIN-ST. JEOR: SCORE: 1452.71

## 2021-07-21 NOTE — PROGRESS NOTES
Assessment & Plan     Benign hypertension  BPs not at goal and having elevated DBP readings. Unsure if headaches related or not but will add lowe dose amlodipine and can increase a needed. BP recheck in 1-2 weeks.    CKD (chronic kidney disease) stage 1, GFR 90 ml/min or greater  Microalbuminuria  Followed by nephrology, on ACE-I.    Episodic tension-type headache, not intractable  See above. No red flags today.    There are no Patient Instructions on file for this visit.    No follow-ups on file.    Callie Rosado NP  [facility] JULIET Ferris is a 56 year old who presents for the following health issues     HPI     ED/UC Followup:  Facility:  [facility]  Date of visit: 7/19/21  Reason for visit: Hypertension  Current Status: BP is still high no GIL  She notes she has chronically low sodium right now and is anticipating future workup for it with her nephrologist.       Since ER visit, she has been checking her BPs.  Home readings having high DBP readings >100    Had 24 hr BP monitor done per nephrology, DBP elevated  Max  and max     Denies chest pain, palpitations, dyspnea with hills/exertion but not severe at this time-denies dyspnea at rest  Admits to stress, some social anxiety with COVID.  Wonders if headaches are related to stress or BP?  Headaches all over, intermittent.    CTA Head Neck w/ IV contrast:   HEAD CTA:   1.  Negative. No vessel stenosis, occlusion or aneurysm.   NECK CTA:   1.  Negative. No dissection or hemodynamically significant narrowing   in the neck by NASCET criteria, as per radiology.  CT Head w/o IV contrast:   No intracranial hemorrhage, mass, or definite CT evidence of recent   Ischemia, as per radiology.    Review of Systems   Constitutional, HEENT, cardiovascular, pulmonary, gi and gu systems are negative, except as otherwise noted.      Objective    /76 (BP Location: Right arm, Patient Position: Chair, Cuff Size: Adult Regular)    "Pulse 69   Temp 98.1  F (36.7  C) (Tympanic)   Resp 16   Ht 1.753 m (5' 9\")   Wt 79.8 kg (176 lb)   SpO2 98%   BMI 25.99 kg/m    Body mass index is 25.99 kg/m .  Physical Exam   GENERAL: healthy, alert and no distress  RESP: lungs clear to auscultation - no rales, rhonchi or wheezes  CV: regular rate and rhythm, normal S1 S2, no S3 or S4, no murmur, click or rub, no peripheral edema and peripheral pulses strong    No results found for this or any previous visit (from the past 24 hour(s)).            "

## 2021-07-23 ENCOUNTER — MYC MEDICAL ADVICE (OUTPATIENT)
Dept: PEDIATRICS | Facility: CLINIC | Age: 56
End: 2021-07-23

## 2021-07-23 DIAGNOSIS — I10 BENIGN HYPERTENSION: Primary | ICD-10-CM

## 2021-07-23 RX ORDER — AMLODIPINE BESYLATE 2.5 MG/1
2.5 TABLET ORAL DAILY
Qty: 90 TABLET | Refills: 0 | Status: SHIPPED | OUTPATIENT
Start: 2021-07-23 | End: 2021-09-13

## 2021-07-23 NOTE — TELEPHONE ENCOUNTER
Let's start at 2.5 mg and recheck BP with nurse only or pharmacy check in 2 weeks. No need for labs.  Continue to monitor BP at home + headaches  CIARAN Hancock, CNP

## 2021-08-04 ENCOUNTER — MYC MEDICAL ADVICE (OUTPATIENT)
Dept: PEDIATRICS | Facility: CLINIC | Age: 56
End: 2021-08-04

## 2021-09-04 ENCOUNTER — HEALTH MAINTENANCE LETTER (OUTPATIENT)
Age: 56
End: 2021-09-04

## 2021-09-11 DIAGNOSIS — I10 BENIGN ESSENTIAL HYPERTENSION: ICD-10-CM

## 2021-09-13 ENCOUNTER — VIRTUAL VISIT (OUTPATIENT)
Dept: NEPHROLOGY | Facility: CLINIC | Age: 56
End: 2021-09-13
Attending: INTERNAL MEDICINE
Payer: COMMERCIAL

## 2021-09-13 VITALS — BODY MASS INDEX: 26.07 KG/M2 | WEIGHT: 176 LBS | HEIGHT: 69 IN

## 2021-09-13 DIAGNOSIS — I10 BENIGN ESSENTIAL HYPERTENSION: ICD-10-CM

## 2021-09-13 DIAGNOSIS — R80.9 ALBUMINURIA: Primary | ICD-10-CM

## 2021-09-13 PROCEDURE — 99214 OFFICE O/P EST MOD 30 MIN: CPT | Mod: 95 | Performed by: INTERNAL MEDICINE

## 2021-09-13 RX ORDER — NIFEDIPINE 30 MG/1
30 TABLET, EXTENDED RELEASE ORAL DAILY
Qty: 30 TABLET | Refills: 11 | Status: SHIPPED | OUTPATIENT
Start: 2021-09-13 | End: 2021-11-01

## 2021-09-13 RX ORDER — LISINOPRIL 20 MG/1
20 TABLET ORAL 2 TIMES DAILY
Qty: 180 TABLET | Refills: 3 | Status: SHIPPED | OUTPATIENT
Start: 2021-09-13 | End: 2022-05-23

## 2021-09-13 RX ORDER — LISINOPRIL 20 MG/1
TABLET ORAL
Qty: 120 TABLET | Refills: 3 | Status: SHIPPED | OUTPATIENT
Start: 2021-09-13 | End: 2021-09-13

## 2021-09-13 ASSESSMENT — MIFFLIN-ST. JEOR: SCORE: 1452.71

## 2021-09-13 NOTE — PROGRESS NOTES
Nephrology Clinic - Telephone Visit    Sam Freire MD  2021     Name: Josy John  MRN: [id number]  Age: 56 year old  : 1965  Referring provider: Samantha Mcdonald     Assessment and Plan:     CKD, stage 1/albuminuria: She has preserved kidney function with minimal albuminuria.   She has no microscopic hematuria or evidence of active urinary sediment.  I suspect she sustained some renal injury at an early age as a result of recurrent UTI/pyelonephritis form urinary reflux (that has since been corrected with ureteral reimplantation).  As a result, she likely has some mild scarring resulting in minimal albuminuria (452 mg/g in 2020 which was an increase from 138 in 2018).      - renal ultrasound performed and essentially normal  - continue RAAS blockade for renoprotection/albuminuria and hypertension with lisinopril tat 20 mg BID  - return to clinic in 6 months     Hypertension: Primary. Appears more near goal of at least <130/80 especially based on 24 ambulatory blood pressure monitoring.  She also had an excellent dipping pattern.  Regardless, she does have very high blood pressures at times that has led to an ER visit such that more aggressive control seems warranted.    -will discontinue amlodipine (2.5 mg) at bedtime and start nifedipine XL at 30 mg in the am (and prn stressful events associated with much higher BPs).  She will continue on lisinopril at 20 mg BID.      Hyponatermia: Sodium in the low 130's but stable.  Asymptomatic.    -will evaluate with urine studies at her f/u visit in 2 months.  If she becomes frankly hyponatremic then we will see her sooner.      Follow-up: RTC in 6 months     HPI:   Josy John is a 56 year old woman with a history of hypertension (controlled on lisinopril and amlodipine) who was referred for nephrology consult by her PCP due to proteinuria. The patient reports when she was 5 y.o. She had recurrent UTI/pyelonephritis for  approximately a year and finally had ureteral implantation to prevent suspected ureteral reflux. Since then, she has not had any significant history of UTI. She states she was diagnosed with hypertension many years ago. She was first started on hydrochlorothiazide but then switched to amlodipine and lisinopril. She previously was on 20 mg lisinopril once daily, which caused her to have significant muscle cramping, so this was decreased to 10 mg once daily but later returned to 20 mg daily.     She was seen by her PCP and noted to have proteinuria and referred to nephrology.  The patient does express concern for why these proteins are showing up in her urine. She notes she had occipital headaches when she wakes up in the morning last year and wonders if this is related to hormone levels or caffeine intake. More recenly she reports having migraines that she thinks may be related to undergoing menopause.  She denies any significant pain medication use, though she does use tylenol during arthritis flares. She also denies any history of kidney stones or diabetes.  She has no family history of kidney disease.  Her lisinopril remains at 20 mg daily and she continues on amlodipine at 2.5 mg dialy.  Her home BP varies and is sometimes in closer to 138/80's more recently.  She continues on a low salt diet and exercises regularly.            Interval History:  Mrs. John is currently on lisinopril 20 mg BID with a home BP of 120//90.  She reports amlodpine din't help though continues at 2.5 mg at bedtime.  She reports labile BP especially during social events.  She also reports was celexa increasd a few years back and she has been on  lisinopril for years. Her daily fluid intake usually is 16 oz + carbonated 12 oz + another 16 oz.  She is feeling well with the exception of occasional fatigue.         Review of Systems:   Pertinent items are noted in HPI or as below, remainder of complete ROS is negative.      Active  Medications:   Current Outpatient Medications   Medication Sig Dispense Refill     amLODIPine (NORVASC) 2.5 MG tablet Take 1 tablet (2.5 mg) by mouth daily 90 tablet 0     Cholecalciferol (VITAMIN D3 PO) Take 1,000 Units by mouth daily       citalopram (CELEXA) 20 MG tablet TAKE ONE TABLET BY MOUTH ONCE DAILY 90 tablet 3     lisinopril (ZESTRIL) 20 MG tablet TAKE ONE TABLET BY MOUTH TWICE A  tablet 3     magnesium 250 MG tablet Take 1 tablet by mouth daily          Allergies:   Zoloft      Past Medical History:  Generalized anxiety disorder   Hypertension, controlled  Microalbuminuria  Vitamin D deficiency     Past Surgical History:  Reimplantation of ureters    Family History:   No family history of kidney disease. Family hypertension (mother, brother, and father).      Social History:   The patient has never smoked. Denies alcohol use.     Physical Exam:  Physical exam deferred as encounter ws a video visit.      Laboratory:  CMP  Recent Labs   Lab Test 07/19/21  1713 06/08/21  1652 10/05/20  0822 07/02/20  1545 02/29/20  0910 12/27/18  0229 06/20/16  0854   * 128* 131* 131* 130* 133 138   POTASSIUM 3.7 4.3 4.2 3.9 4.5 4.2 3.9   CHLORIDE 99 97 99 99 98 100 104   CO2 25 24 26 29 26 30 27   ANIONGAP 8 7 6 3 6 4 7   GLC 94 82 80 86 98 96 87   BUN 11 12 14 14 14 14 9   CR 0.67 0.59 0.76 0.68 0.58 0.64 0.71   GFRESTIMATED >90 >90 88 >90 >90 >90 86   GFRESTBLACK  --  >90 >90 >90 >90 >90 >90  African American GFR Calc     KRIS 9.1 9.1 9.3 9.1 9.1 8.9 8.8   PHOS  --   --  3.7  --   --  4.2  --    PROTTOTAL 8.2 8.5  --   --  8.7  --  8.4   ALBUMIN 4.0 4.1 4.1  --  4.1 3.9 4.0   BILITOTAL 0.3 0.3  --   --  0.4  --  0.4   ALKPHOS 93 96  --   --  87  --  68   AST 23 23  --   --  25  --  22   ALT 24 28  --   --  32  --  24     CBC  Recent Labs   Lab Test 07/19/21  1714 06/08/21  1652 10/05/20  0822 02/29/20  0910   HGB 12.3 12.4 13.3 13.7   WBC 6.7 8.0 5.5 5.8   RBC 3.99 3.96 4.26 4.43   HCT 36.0 36.5 39.3 40.3    MCV 90 92 92 91   MCH 30.8 31.3 31.2 30.9   MCHC 34.2 34.0 33.8 34.0   RDW 11.8 11.6 12.1 12.1    336 316 328       URINE STUDIES  Recent Labs   Lab Test 10/05/20  0823 12/27/18  1433 12/22/17  1521   COLOR Yellow Straw Yellow   APPEARANCE Clear Clear Clear   URINEGLC Negative Negative Negative   URINEBILI Negative Negative Negative   URINEKETONE Negative Negative 15*   SG 1.020 1.008 1.015   UBLD Trace* Negative Small*   URINEPH 7.5* 7.0 6.5   PROTEIN 30* Negative Negative   UROBILINOGEN 0.2  --  0.2   NITRITE Negative Negative Negative   LEUKEST Negative Negative Negative   RBCU O - 2 1 O - 2   WBCU 0 - 5 <1 O - 2     Recent Labs   Lab Test 06/08/21  1652 10/05/20  0823 07/02/20  1546 12/27/18  1433   UTPG 0.62* 0.29* 0.58* 0.38*     PTH  No lab results found.  IRON STUDIES   Recent Labs   Lab Test 06/17/15  1550 10/28/14  1453 10/09/14  1530   IRON 80  --   --      --   --    IRONSAT 20  --   --    YUN 12 18 3*      All above labs were reviewed by me.   Total telephone time was 30 minutes.      Sam Freire MD

## 2021-09-13 NOTE — PROGRESS NOTES
"Chief Complaint   Patient presents with     RECHECK     Height 1.753 m (5' 9\"), weight 79.8 kg (176 lb), not currently breastfeeding.    Josy is a 56 year old who is being evaluated via a billable video visit.      How would you like to obtain your AVS? MyChart  If the video visit is dropped, the invitation should be resent by: Other e-mail: use [employer]  Will anyone else be joining your video visit? No      Visit was converted to telephone visit due to techniical issues.      Total telephone time: 30 minutes    "

## 2021-09-13 NOTE — LETTER
"2021       RE: Josy John  [address] [place] [address]     Dear Colleague,    Thank you for referring your patient, Josy John, to the [facility] [place] at [facility]. Please see a copy of my visit note below.    Chief Complaint   Patient presents with     RECHECK     Height 1.753 m (5' 9\"), weight 79.8 kg (176 lb), not currently breastfeeding.    Josy is a 56 year old who is being evaluated via a billable video visit.      How would you like to obtain your AVS? MyChart  If the video visit is dropped, the invitation should be resent by: Other e-mail: use [employer]  Will anyone else be joining your video visit? No      Visit was converted to telephone visit due to techniical issues.      Total telephone time: 30 minutes          Nephrology Clinic - Telephone Visit    Sam Freire MD  2021     Name: Josy John  MRN: [id number]  Age: 56 year old  : 1965  Referring provider: Samantha Mcdonald     Assessment and Plan:     CKD, stage 1/albuminuria: She has preserved kidney function with minimal albuminuria.   She has no microscopic hematuria or evidence of active urinary sediment.  I suspect she sustained some renal injury at an early age as a result of recurrent UTI/pyelonephritis form urinary reflux (that has since been corrected with ureteral reimplantation).  As a result, she likely has some mild scarring resulting in minimal albuminuria (452 mg/g in 2020 which was an increase from 138 in 2018).      - renal ultrasound performed and essentially normal  - continue RAAS blockade for renoprotection/albuminuria and hypertension with lisinopril tat 20 mg BID  - return to clinic in 6 months     Hypertension: Primary. Appears more near goal of at least <130/80 especially based on 24 ambulatory blood pressure monitoring.  She also had an excellent dipping pattern.  Regardless, she does have " very high blood pressures at times that has led to an ER visit such that more aggressive control seems warranted.    -will discontinue amlodipine (2.5 mg) at bedtime and start nifedipine XL at 30 mg in the am (and prn stressful events associated with much higher BPs).  She will continue on lisinopril at 20 mg BID.      Hyponatermia: Sodium in the low 130's but stable.  Asymptomatic.    -will evaluate with urine studies at her f/u visit in 2 months.  If she becomes frankly hyponatremic then we will see her sooner.      Follow-up: RTC in 6 months     HPI:   Josy John is a 56 year old woman with a history of hypertension (controlled on lisinopril and amlodipine) who was referred for nephrology consult by her PCP due to proteinuria. The patient reports when she was 5 y.o. She had recurrent UTI/pyelonephritis for approximately a year and finally had ureteral implantation to prevent suspected ureteral reflux. Since then, she has not had any significant history of UTI. She states she was diagnosed with hypertension many years ago. She was first started on hydrochlorothiazide but then switched to amlodipine and lisinopril. She previously was on 20 mg lisinopril once daily, which caused her to have significant muscle cramping, so this was decreased to 10 mg once daily but later returned to 20 mg daily.     She was seen by her PCP and noted to have proteinuria and referred to nephrology.  The patient does express concern for why these proteins are showing up in her urine. She notes she had occipital headaches when she wakes up in the morning last year and wonders if this is related to hormone levels or caffeine intake. More recenly she reports having migraines that she thinks may be related to undergoing menopause.  She denies any significant pain medication use, though she does use tylenol during arthritis flares. She also denies any history of kidney stones or diabetes.  She has no family history of kidney disease.  Her  lisinopril remains at 20 mg daily and she continues on amlodipine at 2.5 mg dialy.  Her home BP varies and is sometimes in closer to 138/80's more recently.  She continues on a low salt diet and exercises regularly.            Interval History:  Mrs. John is currently on lisinopril 20 mg BID with a home BP of 120//90.  She reports amlodpine din't help though continues at 2.5 mg at bedtime.  She reports labile BP especially during social events.  She also reports was celexa increasd a few years back and she has been on  lisinopril for years. Her daily fluid intake usually is 16 oz + carbonated 12 oz + another 16 oz.  She is feeling well with the exception of occasional fatigue.         Review of Systems:   Pertinent items are noted in HPI or as below, remainder of complete ROS is negative.      Active Medications:   Current Outpatient Medications   Medication Sig Dispense Refill     amLODIPine (NORVASC) 2.5 MG tablet Take 1 tablet (2.5 mg) by mouth daily 90 tablet 0     Cholecalciferol (VITAMIN D3 PO) Take 1,000 Units by mouth daily       citalopram (CELEXA) 20 MG tablet TAKE ONE TABLET BY MOUTH ONCE DAILY 90 tablet 3     lisinopril (ZESTRIL) 20 MG tablet TAKE ONE TABLET BY MOUTH TWICE A  tablet 3     magnesium 250 MG tablet Take 1 tablet by mouth daily          Allergies:   Zoloft      Past Medical History:  Generalized anxiety disorder   Hypertension, controlled  Microalbuminuria  Vitamin D deficiency     Past Surgical History:  Reimplantation of ureters    Family History:   No family history of kidney disease. Family hypertension (mother, brother, and father).      Social History:   The patient has never smoked. Denies alcohol use.     Physical Exam:  Physical exam deferred as encounter ws a video visit.      Laboratory:  CMP  Recent Labs   Lab Test 07/19/21  1713 06/08/21  1652 10/05/20  0822 07/02/20  1545 02/29/20  0910 12/27/18  0229 06/20/16  0854   * 128* 131* 131* 130* 133 138   POTASSIUM  3.7 4.3 4.2 3.9 4.5 4.2 3.9   CHLORIDE 99 97 99 99 98 100 104   CO2 25 24 26 29 26 30 27   ANIONGAP 8 7 6 3 6 4 7   GLC 94 82 80 86 98 96 87   BUN 11 12 14 14 14 14 9   CR 0.67 0.59 0.76 0.68 0.58 0.64 0.71   GFRESTIMATED >90 >90 88 >90 >90 >90 86   GFRESTBLACK  --  >90 >90 >90 >90 >90 >90  African American GFR Calc     KRIS 9.1 9.1 9.3 9.1 9.1 8.9 8.8   PHOS  --   --  3.7  --   --  4.2  --    PROTTOTAL 8.2 8.5  --   --  8.7  --  8.4   ALBUMIN 4.0 4.1 4.1  --  4.1 3.9 4.0   BILITOTAL 0.3 0.3  --   --  0.4  --  0.4   ALKPHOS 93 96  --   --  87  --  68   AST 23 23  --   --  25  --  22   ALT 24 28  --   --  32  --  24     CBC  Recent Labs   Lab Test 07/19/21  1714 06/08/21  1652 10/05/20  0822 02/29/20  0910   HGB 12.3 12.4 13.3 13.7   WBC 6.7 8.0 5.5 5.8   RBC 3.99 3.96 4.26 4.43   HCT 36.0 36.5 39.3 40.3   MCV 90 92 92 91   MCH 30.8 31.3 31.2 30.9   MCHC 34.2 34.0 33.8 34.0   RDW 11.8 11.6 12.1 12.1    336 316 328       URINE STUDIES  Recent Labs   Lab Test 10/05/20  0823 12/27/18  1433 12/22/17  1521   COLOR Yellow Straw Yellow   APPEARANCE Clear Clear Clear   URINEGLC Negative Negative Negative   URINEBILI Negative Negative Negative   URINEKETONE Negative Negative 15*   SG 1.020 1.008 1.015   UBLD Trace* Negative Small*   URINEPH 7.5* 7.0 6.5   PROTEIN 30* Negative Negative   UROBILINOGEN 0.2  --  0.2   NITRITE Negative Negative Negative   LEUKEST Negative Negative Negative   RBCU O - 2 1 O - 2   WBCU 0 - 5 <1 O - 2     Recent Labs   Lab Test 06/08/21  1652 10/05/20  0823 07/02/20  1546 12/27/18  1433   UTPG 0.62* 0.29* 0.58* 0.38*     PTH  No lab results found.  IRON STUDIES   Recent Labs   Lab Test 06/17/15  1550 10/28/14  1453 10/09/14  1530   IRON 80  --   --      --   --    IRONSAT 20  --   --    YUN 12 18 3*      All above labs were reviewed by me.   Total telephone time was 30 minutes.      Sam Freire MD        Again, thank you for allowing me to participate in the care of your  patient.      Sincerely,    Sam Freire MD

## 2021-09-13 NOTE — PATIENT INSTRUCTIONS
-Please discontinue amlodipine 2.5 daily  -Please start nifedipine XL 30 mg daily in am; it is ok to take another dose during times of stress-induced hypertension  -Please continue lisinopril at 20 mg 2x/day  -Otherwise, return to clinic in 6 months  -Please let us know how your blood pressure is doing.  We can be reached at [phone].

## 2021-09-14 ENCOUNTER — TELEPHONE (OUTPATIENT)
Dept: NEPHROLOGY | Facility: CLINIC | Age: 56
End: 2021-09-14

## 2021-10-11 ENCOUNTER — APPOINTMENT (OUTPATIENT)
Dept: URGENT CARE | Facility: URGENT CARE | Age: 56
End: 2021-10-11
Payer: COMMERCIAL

## 2021-10-11 ENCOUNTER — LAB REQUISITION (OUTPATIENT)
Dept: LAB | Facility: CLINIC | Age: 56
End: 2021-10-11

## 2021-10-11 LAB — SARS-COV-2 RNA RESP QL NAA+PROBE: NEGATIVE

## 2021-10-11 PROCEDURE — U0005 INFEC AGEN DETEC AMPLI PROBE: HCPCS | Performed by: INTERNAL MEDICINE

## 2021-11-01 DIAGNOSIS — I10 BENIGN ESSENTIAL HYPERTENSION: ICD-10-CM

## 2021-11-01 RX ORDER — NIFEDIPINE 30 MG/1
30 TABLET, EXTENDED RELEASE ORAL 2 TIMES DAILY
Qty: 60 TABLET | Refills: 11 | Status: SHIPPED | OUTPATIENT
Start: 2021-11-01 | End: 2022-05-23

## 2022-01-25 ENCOUNTER — HOSPITAL ENCOUNTER (OUTPATIENT)
Dept: MAMMOGRAPHY | Facility: CLINIC | Age: 57
Discharge: HOME | End: 2022-01-25
Attending: PEDIATRICS | Admitting: PEDIATRICS
Payer: COMMERCIAL

## 2022-01-25 DIAGNOSIS — Z12.31 VISIT FOR SCREENING MAMMOGRAM: ICD-10-CM

## 2022-01-25 PROCEDURE — 77067 SCR MAMMO BI INCL CAD: CPT

## 2022-04-25 ENCOUNTER — OFFICE VISIT (OUTPATIENT)
Dept: FAMILY MEDICINE | Facility: CLINIC | Age: 57
End: 2022-04-25
Payer: COMMERCIAL

## 2022-04-25 VITALS
DIASTOLIC BLOOD PRESSURE: 80 MMHG | OXYGEN SATURATION: 99 % | RESPIRATION RATE: 14 BRPM | SYSTOLIC BLOOD PRESSURE: 130 MMHG | HEART RATE: 77 BPM | WEIGHT: 180 LBS | BODY MASS INDEX: 26.66 KG/M2 | HEIGHT: 69 IN

## 2022-04-25 DIAGNOSIS — R80.1 PERSISTENT PROTEINURIA: ICD-10-CM

## 2022-04-25 DIAGNOSIS — I10 BENIGN ESSENTIAL HYPERTENSION: ICD-10-CM

## 2022-04-25 DIAGNOSIS — N18.1 CKD (CHRONIC KIDNEY DISEASE) STAGE 1, GFR 90 ML/MIN OR GREATER: ICD-10-CM

## 2022-04-25 DIAGNOSIS — Z11.4 SCREENING FOR HIV (HUMAN IMMUNODEFICIENCY VIRUS): Primary | ICD-10-CM

## 2022-04-25 DIAGNOSIS — R05.9 COUGH: ICD-10-CM

## 2022-04-25 DIAGNOSIS — E87.1 HYPONATREMIA: ICD-10-CM

## 2022-04-25 PROCEDURE — U0003 INFECTIOUS AGENT DETECTION BY NUCLEIC ACID (DNA OR RNA); SEVERE ACUTE RESPIRATORY SYNDROME CORONAVIRUS 2 (SARS-COV-2) (CORONAVIRUS DISEASE [COVID-19]), AMPLIFIED PROBE TECHNIQUE, MAKING USE OF HIGH THROUGHPUT TECHNOLOGIES AS DESCRIBED BY CMS-2020-01-R: HCPCS | Performed by: NURSE PRACTITIONER

## 2022-04-25 PROCEDURE — 83935 ASSAY OF URINE OSMOLALITY: CPT | Performed by: NURSE PRACTITIONER

## 2022-04-25 PROCEDURE — 36415 COLL VENOUS BLD VENIPUNCTURE: CPT | Performed by: NURSE PRACTITIONER

## 2022-04-25 PROCEDURE — 85018 HEMOGLOBIN: CPT | Performed by: NURSE PRACTITIONER

## 2022-04-25 PROCEDURE — 84156 ASSAY OF PROTEIN URINE: CPT | Performed by: NURSE PRACTITIONER

## 2022-04-25 PROCEDURE — 82043 UR ALBUMIN QUANTITATIVE: CPT | Performed by: NURSE PRACTITIONER

## 2022-04-25 PROCEDURE — 84300 ASSAY OF URINE SODIUM: CPT | Performed by: NURSE PRACTITIONER

## 2022-04-25 PROCEDURE — U0005 INFEC AGEN DETEC AMPLI PROBE: HCPCS | Performed by: NURSE PRACTITIONER

## 2022-04-25 PROCEDURE — 99000 SPECIMEN HANDLING OFFICE-LAB: CPT | Performed by: NURSE PRACTITIONER

## 2022-04-25 PROCEDURE — 80069 RENAL FUNCTION PANEL: CPT | Performed by: NURSE PRACTITIONER

## 2022-04-25 PROCEDURE — 99213 OFFICE O/P EST LOW 20 MIN: CPT | Performed by: NURSE PRACTITIONER

## 2022-04-25 PROCEDURE — 83930 ASSAY OF BLOOD OSMOLALITY: CPT | Performed by: NURSE PRACTITIONER

## 2022-04-25 PROCEDURE — 84588 ASSAY OF VASOPRESSIN: CPT | Mod: 90 | Performed by: NURSE PRACTITIONER

## 2022-04-25 ASSESSMENT — PATIENT HEALTH QUESTIONNAIRE - PHQ9
10. IF YOU CHECKED OFF ANY PROBLEMS, HOW DIFFICULT HAVE THESE PROBLEMS MADE IT FOR YOU TO DO YOUR WORK, TAKE CARE OF THINGS AT HOME, OR GET ALONG WITH OTHER PEOPLE: NOT DIFFICULT AT ALL
SUM OF ALL RESPONSES TO PHQ QUESTIONS 1-9: 2
SUM OF ALL RESPONSES TO PHQ QUESTIONS 1-9: 2

## 2022-04-25 ASSESSMENT — PAIN SCALES - GENERAL: PAINLEVEL: NO PAIN (0)

## 2022-04-25 NOTE — PROGRESS NOTES
Assessment & Plan   Problem List Items Addressed This Visit    None     Visit Diagnoses     Screening for HIV (human immunodeficiency virus)    -  Primary    Cough        Relevant Orders    Symptomatic; Yes; 4/20/2022 COVID-19 Virus (Coronavirus) by PCR Nose (Completed)    XR Chest 2 Views (Completed)    CKD (chronic kidney disease) stage 1, GFR 90 ml/min or greater        Benign essential hypertension        Hyponatremia        Persistent proteinuria            Discussed self care and avoiding exposure to others, and when to return as indicated if worsening or failure to improve    Patient Instructions   We will let you know about COVID results    Return in about 2 months (around 6/25/2022) for Physical Exam.    CIARAN Mac CNP  [facility] MEIR Ferris is a 57 year old who presents for the following health issues     HPI     Acute Illness  Acute illness concerns: cough  Onset/Duration: 4 days  Symptoms:  Fever: no  Chills/Sweats: no  Headache (location?): YES  Sinus Pressure: no  Conjunctivitis:  no  Ear Pain: YES- fullness and pressure, no acute pain or drainage  Rhinorrhea: YES-   Congestion: YES-   Sore Throat: YES- No exposure to strep  Cough: YES  Wheeze: no  Decreased Appetite: no  Nausea: YES  Vomiting: no  Diarrhea: no  Dysuria/Freq.: no  Dysuria or Hematuria: no  Fatigue/Achiness: no  Sick/Strep Exposure: no  Therapies tried and outcome: tylenol PM   Patient has tested for COVID at home and is negative  Is worried because she has been ill so many times this past year.  Discussed her pending labs from renal and she would like to get these done today.      Review of Systems   Constitutional: Positive for fatigue and fever. Negative for unexpected weight change.   HENT: Positive for congestion, ear pain, rhinorrhea, sinus pressure and voice change. Negative for trouble swallowing.    Eyes: Negative.    Respiratory: Positive for cough. Negative for shortness of breath  and wheezing.    Cardiovascular: Negative for chest pain, palpitations and peripheral edema.        Feels heart going faster with activity, but no sustained palpitations    Gastrointestinal: Negative for diarrhea, nausea and vomiting.   Genitourinary: Negative.    Musculoskeletal: Negative for neck pain and neck stiffness.   Skin: Negative for rash.   Neurological: Negative for tremors, syncope and weakness.            Objective    There were no vitals taken for this visit.  There is no height or weight on file to calculate BMI.  Physical Exam  Constitutional:       General: She is not in acute distress.     Appearance: She is ill-appearing. She is not toxic-appearing.   HENT:      Right Ear: Tympanic membrane normal.      Left Ear: Tympanic membrane normal.      Nose: Congestion present.      Mouth/Throat:      Mouth: Mucous membranes are moist.   Eyes:      Conjunctiva/sclera: Conjunctivae normal.   Cardiovascular:      Rate and Rhythm: Normal rate and regular rhythm.      Pulses: Normal pulses.      Heart sounds: Normal heart sounds. No murmur heard.    No friction rub. No gallop.   Pulmonary:      Effort: Pulmonary effort is normal. No respiratory distress.      Breath sounds: Normal breath sounds. No stridor. No wheezing, rhonchi or rales.   Abdominal:      General: Abdomen is flat. Bowel sounds are normal.      Palpations: Abdomen is soft.   Musculoskeletal:      Cervical back: Neck supple.   Skin:     General: Skin is warm and dry.      Coloration: Skin is not pale.      Findings: No rash.   Neurological:      General: No focal deficit present.      Mental Status: She is alert and oriented to person, place, and time.   Psychiatric:         Behavior: Behavior normal.         Thought Content: Thought content normal.         Judgment: Judgment normal.      Comments: anxious        O2 sat 99% and CXR negative. COVID pending. (Ultimately came back negative

## 2022-04-26 LAB
ALBUMIN SERPL-MCNC: 4.3 G/DL (ref 3.4–5)
ANION GAP SERPL CALCULATED.3IONS-SCNC: 7 MMOL/L (ref 3–14)
BUN SERPL-MCNC: 15 MG/DL (ref 7–30)
CALCIUM SERPL-MCNC: 9.4 MG/DL (ref 8.5–10.1)
CHLORIDE BLD-SCNC: 97 MMOL/L (ref 94–109)
CO2 SERPL-SCNC: 25 MMOL/L (ref 20–32)
CREAT SERPL-MCNC: 0.74 MG/DL (ref 0.52–1.04)
CREAT UR-MCNC: 40 MG/DL
CREAT UR-MCNC: 43 MG/DL
GFR SERPL CREATININE-BSD FRML MDRD: >90 ML/MIN/1.73M2
GLUCOSE BLD-MCNC: 98 MG/DL (ref 70–99)
HGB BLD-MCNC: 13.1 G/DL (ref 11.7–15.7)
MICROALBUMIN UR-MCNC: 434 MG/L
MICROALBUMIN/CREAT UR: 1009.3 MG/G CR (ref 0–25)
OSMOLALITY SERPL: 278 MMOL/KG (ref 275–295)
OSMOLALITY UR: 346 MMOL/KG (ref 100–1200)
PHOSPHATE SERPL-MCNC: 4.1 MG/DL (ref 2.5–4.5)
POTASSIUM BLD-SCNC: 4.6 MMOL/L (ref 3.4–5.3)
PROT UR-MCNC: 0.57 G/L
PROT/CREAT 24H UR: 1.43 G/G CR (ref 0–0.2)
SARS-COV-2 RNA RESP QL NAA+PROBE: NEGATIVE
SODIUM SERPL-SCNC: 129 MMOL/L (ref 133–144)
SODIUM UR-SCNC: 52 MMOL/L

## 2022-04-26 ASSESSMENT — PATIENT HEALTH QUESTIONNAIRE - PHQ9: SUM OF ALL RESPONSES TO PHQ QUESTIONS 1-9: 2

## 2022-04-27 DIAGNOSIS — R80.9 ALBUMINURIA: Primary | ICD-10-CM

## 2022-05-01 LAB — VASOPRESSIN SERPL-MCNC: <0.5 PG/ML

## 2022-05-01 ASSESSMENT — ENCOUNTER SYMPTOMS
WEAKNESS: 0
EYES NEGATIVE: 1
COUGH: 1
UNEXPECTED WEIGHT CHANGE: 0
TROUBLE SWALLOWING: 0
NECK STIFFNESS: 0
PALPITATIONS: 0
FEVER: 1
SINUS PRESSURE: 1
DIARRHEA: 0
NECK PAIN: 0
RHINORRHEA: 1
FATIGUE: 1
VOMITING: 0
VOICE CHANGE: 1
WHEEZING: 0
SHORTNESS OF BREATH: 0
NAUSEA: 0
TREMORS: 0

## 2022-05-09 ENCOUNTER — LAB (OUTPATIENT)
Dept: LAB | Facility: CLINIC | Age: 57
End: 2022-05-09
Payer: COMMERCIAL

## 2022-05-09 DIAGNOSIS — R80.9 ALBUMINURIA: ICD-10-CM

## 2022-05-09 PROCEDURE — 80069 RENAL FUNCTION PANEL: CPT

## 2022-05-09 PROCEDURE — 82043 UR ALBUMIN QUANTITATIVE: CPT

## 2022-05-09 PROCEDURE — 36415 COLL VENOUS BLD VENIPUNCTURE: CPT

## 2022-05-10 LAB
ALBUMIN SERPL-MCNC: 4.1 G/DL (ref 3.4–5)
ANION GAP SERPL CALCULATED.3IONS-SCNC: 8 MMOL/L (ref 3–14)
BUN SERPL-MCNC: 10 MG/DL (ref 7–30)
CALCIUM SERPL-MCNC: 9 MG/DL (ref 8.5–10.1)
CHLORIDE BLD-SCNC: 97 MMOL/L (ref 94–109)
CO2 SERPL-SCNC: 25 MMOL/L (ref 20–32)
CREAT SERPL-MCNC: 0.62 MG/DL (ref 0.52–1.04)
CREAT UR-MCNC: 48 MG/DL
GFR SERPL CREATININE-BSD FRML MDRD: >90 ML/MIN/1.73M2
GLUCOSE BLD-MCNC: 102 MG/DL (ref 70–99)
MICROALBUMIN UR-MCNC: 91 MG/L
MICROALBUMIN/CREAT UR: 189.58 MG/G CR (ref 0–25)
PHOSPHATE SERPL-MCNC: 3.6 MG/DL (ref 2.5–4.5)
POTASSIUM BLD-SCNC: 4.2 MMOL/L (ref 3.4–5.3)
SODIUM SERPL-SCNC: 130 MMOL/L (ref 133–144)

## 2022-05-23 ENCOUNTER — VIRTUAL VISIT (OUTPATIENT)
Dept: NEPHROLOGY | Facility: CLINIC | Age: 57
End: 2022-05-23
Attending: INTERNAL MEDICINE
Payer: COMMERCIAL

## 2022-05-23 VITALS
DIASTOLIC BLOOD PRESSURE: 86 MMHG | SYSTOLIC BLOOD PRESSURE: 133 MMHG | WEIGHT: 178 LBS | HEIGHT: 69 IN | BODY MASS INDEX: 26.36 KG/M2

## 2022-05-23 DIAGNOSIS — R80.9 ALBUMINURIA: ICD-10-CM

## 2022-05-23 DIAGNOSIS — E87.1 HYPONATREMIA: ICD-10-CM

## 2022-05-23 DIAGNOSIS — I10 HYPERTENSION, ESSENTIAL: Primary | ICD-10-CM

## 2022-05-23 PROCEDURE — 99214 OFFICE O/P EST MOD 30 MIN: CPT | Mod: 95 | Performed by: INTERNAL MEDICINE

## 2022-05-23 RX ORDER — LOSARTAN POTASSIUM 50 MG/1
50 TABLET ORAL 2 TIMES DAILY
Qty: 60 TABLET | Refills: 11 | Status: SHIPPED | OUTPATIENT
Start: 2022-05-23 | End: 2023-06-12

## 2022-05-23 RX ORDER — CARVEDILOL 25 MG/1
12.5 TABLET ORAL 2 TIMES DAILY WITH MEALS
Qty: 60 TABLET | Refills: 11 | Status: SHIPPED | OUTPATIENT
Start: 2022-05-23 | End: 2022-08-09

## 2022-05-23 ASSESSMENT — PAIN SCALES - GENERAL: PAINLEVEL: NO PAIN (0)

## 2022-05-23 NOTE — PATIENT INSTRUCTIONS
-Please discontinue lisinopril and nifedipine  -Please start losartan at 50 mg 2x/day and carvedilol at 12.5 mg 2x/day  -We will likely increase carvedilol to 25 mg 2x/day next  -Please call and let us know how you are doing.  We can be reached [phone].   -Otherwise, please follow-up in 6 months

## 2022-05-23 NOTE — PROGRESS NOTES
Josy is a 57 year old who is being evaluated via a billable video visit.      How would you like to obtain your AVS? MyChart  If the video visit is dropped, the invitation should be resent by: Text to cell phone: [phone]  Will anyone else be joining your video visit? No      Video Start Time: 9:38 AM  Video-Visit Details    Video visit was converted to a telephone visit due to technical issues.      Total Telephone Visit Time: 30 minutes    Sam Freire MD

## 2022-05-23 NOTE — PROGRESS NOTES
Nephrology Clinic - Video Visit    Sam Freire MD  2022     Name: Josy John  MRN: [id number]  Age: 57 year old  : 1965  Referring provider: Samantha Mcdonald     Assessment and Plan:     CKD, stage 1/albuminuria: She has preserved kidney function with minimal albuminuria (~190 mg/g).   She has no microscopic hematuria or evidence of active urinary sediment.  I suspect she sustained some renal injury at an early age as a result of recurrent UTI/pyelonephritis form urinary reflux (that has since been corrected with ureteral reimplantation).  As a result, she likely has some mild scarring resulting in albuminuria that has remained well controlled when on RAAS blockade with lisinopril and BP under decent control.      - renal ultrasound performed and essentially normal  - continue RAAS blockade for renoprotection/albuminuria and hypertension but will disontinue lisinopril at 20 mg BID due to dry cough and start losartan at 50 mg BID  - return to clinic in 6 months     Hypertension: Primary. Now appears at goal of at least <130/80 especially based on 24 ambulatory blood pressure monitoring.  She also had an excellent dipping pattern.  Regardless, she does have very high blood pressures at times that has led to an ER visit such that more aggressive control seems warranted.  She also complains of edema with CCB and dry cough with lisinopril.      - will discontinue nifedipine XL at 30 mg BID and lisinopril at 20 mg BID with the goal of improving any edema and dry cough, respectively.    - will start losartan at 50 mg 2x/day and carvedilol at 12.5 mg 2x/day  - we will likely increase carvedilol to 25 mg 2x/day next  -measure home BP and report to clinic for further medication adjustment  -RTC in 6 months    Hyponatremia (Hypoosmolar): Sodium in the low 130's but stable.  Asymptomatic. Urine sodium and osmolality relatively high at 52 mEq/L and 346 mOsm/Kg, respectively, in the  setting of hyponatremia (serum sodium of 129).  As such, SIADH is suggested from urinary studies but seems mild and/or insignificant.  It is still possible she has reset osmostat or possibly nephrogenic syndrome of inappropriate ADH (NSIAD) as she did have a low measured vasopressin level when her serum sodium was low and urine osm elevated.    -monitor for now; I am hesitant to make any changes in her medications (particularly her celexa) for concern of SIADH as her serum sodium is mildly low and stable, she is asymptomatic and the etiology seems unlikely related to medications.  Will consider more aggressive management should she develop more overt/moderate hyponatremia.       Follow-up: RTC in 6 months     HPI:   Josy John is a 57 year old woman with a history of hypertension (controlled on lisinopril and amlodipine) who was referred for nephrology consult by her PCP due to proteinuria. She reports when she was 5 y.o. she had recurrent UTI/pyelonephritis for approximately a year and finally had ureteral implantation to prevent suspected ureteral reflux. Since then, she has not had any significant history of UTsI. She states she was diagnosed with hypertension many years ago. She was first started on hydrochlorothiazide but then switched to amlodipine and lisinopril. She previously was on 20 mg lisinopril once daily, which caused her to have significant muscle cramping, so this was decreased to 10 mg once daily but later returned to 20 mg daily.     She was seen by her PCP and noted to have proteinuria and referred to nephrology.  She has expressed concern for why proteins are showing up in her urine. She notes she had occipital headaches when she wakes up in the morning last year and wonders if this is related to hormone levels or caffeine intake. More recenly she reports having migraines that she thinks may be related to undergoing menopause.  She denies any significant pain medication use, though she does  use tylenol during arthritis flares. She also denies any history of kidney stones or diabetes.  She has no family history of kidney disease.  Her home BP varies and is sometimes in closer to 138/80's more recently.  She continues on a low salt diet and exercises regularly.            Interval History:  Mrs. John reports amlodpine din't help and so 2.5 mg at bedtime was discontinued and nifedipine started.  She reports her home BP is under descent control after starting nifedipine XL 30 mg BID and continuing lisinopril at 20 mg BID.  However, she complains of intermittent lower extremity edema and a dry cough.  She reports that her In the past, she reported labile BP especially during social events but this does not seem to an issue at this time.  She also reports was celexa increasd a few years back and she has been on  lisinopril for years. Her daily fluid intake usually is 16 oz + carbonated 12 oz + another 16 oz.  She is feeling well with the exception of occasional fatigue.  Much time was spent discussing her albuminuria which did improve after overcoming a viral illness and restarting lisinopril (~1000 mg/g to ~190 mg/g).         Review of Systems:   Pertinent items are noted in HPI or as below, remainder of complete ROS is negative.      Active Medications:   Current Outpatient Medications   Medication Sig Dispense Refill     Cholecalciferol (VITAMIN D3 PO) Take 1,000 Units by mouth daily       citalopram (CELEXA) 20 MG tablet TAKE ONE TABLET BY MOUTH ONCE DAILY 90 tablet 3     lisinopril (ZESTRIL) 20 MG tablet Take 1 tablet (20 mg) by mouth 2 times daily 180 tablet 3     magnesium 250 MG tablet Take 1 tablet by mouth daily       NIFEdipine ER OSMOTIC (PROCARDIA XL) 30 MG 24 hr tablet Take 1 tablet (30 mg) by mouth 2 times daily 60 tablet 11        Allergies:   Zoloft      Past Medical History:  Generalized anxiety disorder   Hypertension, controlled  Microalbuminuria  Vitamin D deficiency     Past Surgical  History:  Reimplantation of ureters    Family History:   No family history of kidney disease. Family hypertension (mother, brother, and father).      Social History:   The patient has never smoked. Denies alcohol use.     Physical Exam:  Physical exam deferred as encounter ws a video visit.      Laboratory:  CMP  Recent Labs   Lab Test 05/09/22  1136 04/25/22  1823 07/19/21  1713 06/08/21  1652 10/05/20  0822 07/02/20  1545 02/29/20  0910 12/27/18  0229 10/26/16  1452 06/20/16  0854   * 129* 132* 128* 131* 131* 130* 133   < > 138   POTASSIUM 4.2 4.6 3.7 4.3 4.2 3.9 4.5 4.2   < > 3.9   CHLORIDE 97 97 99 97 99 99 98 100   < > 104   CO2 25 25 25 24 26 29 26 30   < > 27   ANIONGAP 8 7 8 7 6 3 6 4   < > 7   * 98 94 82 80 86 98 96   < > 87   BUN 10 15 11 12 14 14 14 14   < > 9   CR 0.62 0.74 0.67 0.59 0.76 0.68 0.58 0.64   < > 0.71   GFRESTIMATED >90 >90 >90 >90 88 >90 >90 >90   < > 86   GFRESTBLACK  --   --   --  >90 >90 >90 >90 >90   < > >90  African American GFR Calc     KRIS 9.0 9.4 9.1 9.1 9.3 9.1 9.1 8.9   < > 8.8   PHOS 3.6 4.1  --   --  3.7  --   --  4.2  --   --    PROTTOTAL  --   --  8.2 8.5  --   --  8.7  --   --  8.4   ALBUMIN 4.1 4.3 4.0 4.1 4.1  --  4.1 3.9  --  4.0   BILITOTAL  --   --  0.3 0.3  --   --  0.4  --   --  0.4   ALKPHOS  --   --  93 96  --   --  87  --   --  68   AST  --   --  23 23  --   --  25  --   --  22   ALT  --   --  24 28  --   --  32  --   --  24    < > = values in this interval not displayed.     CBC  Recent Labs   Lab Test 04/25/22  1823 07/19/21  1714 06/08/21  1652 10/05/20  0822 02/29/20  0910   HGB 13.1 12.3 12.4 13.3 13.7   WBC  --  6.7 8.0 5.5 5.8   RBC  --  3.99 3.96 4.26 4.43   HCT  --  36.0 36.5 39.3 40.3   MCV  --  90 92 92 91   MCH  --  30.8 31.3 31.2 30.9   MCHC  --  34.2 34.0 33.8 34.0   RDW  --  11.8 11.6 12.1 12.1   PLT  --  341 336 316 328       URINE STUDIES  Recent Labs   Lab Test 10/05/20  0823 12/27/18  1433 12/22/17  1521   COLOR Yellow Straw Yellow    APPEARANCE Clear Clear Clear   URINEGLC Negative Negative Negative   URINEBILI Negative Negative Negative   URINEKETONE Negative Negative 15*   SG 1.020 1.008 1.015   UBLD Trace* Negative Small*   URINEPH 7.5* 7.0 6.5   PROTEIN 30* Negative Negative   UROBILINOGEN 0.2  --  0.2   NITRITE Negative Negative Negative   LEUKEST Negative Negative Negative   RBCU O - 2 1 O - 2   WBCU 0 - 5 <1 O - 2     Recent Labs   Lab Test 04/25/22  1829 06/08/21  1652 10/05/20  0823 07/02/20  1546 12/27/18  1433   UTPG 1.43* 0.62* 0.29* 0.58* 0.38*     PTH  No lab results found.  IRON STUDIES   Recent Labs   Lab Test 06/17/15  1550 10/28/14  1453 10/09/14  1530   IRON 80  --   --      --   --    IRONSAT 20  --   --    YUN 12 18 3*      All above labs were reviewed by me.     Total telephone time was 30 minutes.      Sam Freire MD  [phone]

## 2022-05-23 NOTE — LETTER
2022       RE: Josy John  [address] [place] [address]     Dear Colleague,    Thank you for referring your patient, Josy John, to the [facility] [place] [facility]. Please see a copy of my visit note below.    Josy is a 57 year old who is being evaluated via a billable video visit.      How would you like to obtain your AVS? MyChart  If the video visit is dropped, the invitation should be resent by: Text to cell phone: [phone]  Will anyone else be joining your video visit? No      Video Start Time: 9:38 AM  Video-Visit Details    Video visit was converted to a telephone visit due to technical issues.      Total Telephone Visit Time: 30 minutes    Sam Freire MD         Nephrology Clinic - Video Visit    Sam Freire MD  2022     Name: Josy John  MRN: [id number]  Age: 57 year old  : 1965  Referring provider: Samantha Mcdonald     Assessment and Plan:     CKD, stage 1/albuminuria: She has preserved kidney function with minimal albuminuria (~190 mg/g).   She has no microscopic hematuria or evidence of active urinary sediment.  I suspect she sustained some renal injury at an early age as a result of recurrent UTI/pyelonephritis form urinary reflux (that has since been corrected with ureteral reimplantation).  As a result, she likely has some mild scarring resulting in albuminuria that has remained well controlled when on RAAS blockade with lisinopril and BP under decent control.      - renal ultrasound performed and essentially normal  - continue RAAS blockade for renoprotection/albuminuria and hypertension but will disontinue lisinopril at 20 mg BID due to dry cough and start losartan at 50 mg BID  - return to clinic in 6 months     Hypertension: Primary. Now appears at goal of at least <130/80 especially based on 24 ambulatory blood pressure monitoring.  She also  had an excellent dipping pattern.  Regardless, she does have very high blood pressures at times that has led to an ER visit such that more aggressive control seems warranted.  She also complains of edema with CCB and dry cough with lisinopril.      - will discontinue nifedipine XL at 30 mg BID and lisinopril at 20 mg BID with the goal of improving any edema and dry cough, respectively.    - will start losartan at 50 mg 2x/day and carvedilol at 12.5 mg 2x/day  - we will likely increase carvedilol to 25 mg 2x/day next  -measure home BP and report to clinic for further medication adjustment  -RTC in 6 months    Hyponatremia (Hypoosmolar): Sodium in the low 130's but stable.  Asymptomatic. Urine sodium and osmolality relatively high at 52 mEq/L and 346 mOsm/Kg, respectively, in the setting of hyponatremia (serum sodium of 129).  As such, SIADH is suggested from urinary studies but seems mild and/or insignificant.  It is still possible she has reset osmostat or possibly nephrogenic syndrome of inappropriate ADH (NSIAD) as she did have a low measured vasopressin level when her serum sodium was low and urine osm elevated.    -monitor for now; I am hesitant to make any changes in her medications (particularly her celexa) for concern of SIADH as her serum sodium is mildly low and stable, she is asymptomatic and the etiology seems unlikely related to medications.  Will consider more aggressive management should she develop more overt/moderate hyponatremia.       Follow-up: RTC in 6 months     HPI:   Josy John is a 57 year old woman with a history of hypertension (controlled on lisinopril and amlodipine) who was referred for nephrology consult by her PCP due to proteinuria. She reports when she was 5 y.o. she had recurrent UTI/pyelonephritis for approximately a year and finally had ureteral implantation to prevent suspected ureteral reflux. Since then, she has not had any significant history of UTsI. She states she was  diagnosed with hypertension many years ago. She was first started on hydrochlorothiazide but then switched to amlodipine and lisinopril. She previously was on 20 mg lisinopril once daily, which caused her to have significant muscle cramping, so this was decreased to 10 mg once daily but later returned to 20 mg daily.     She was seen by her PCP and noted to have proteinuria and referred to nephrology.  She has expressed concern for why proteins are showing up in her urine. She notes she had occipital headaches when she wakes up in the morning last year and wonders if this is related to hormone levels or caffeine intake. More recenly she reports having migraines that she thinks may be related to undergoing menopause.  She denies any significant pain medication use, though she does use tylenol during arthritis flares. She also denies any history of kidney stones or diabetes.  She has no family history of kidney disease.  Her home BP varies and is sometimes in closer to 138/80's more recently.  She continues on a low salt diet and exercises regularly.            Interval History:  Mrs. John reports amlodpine din't help and so 2.5 mg at bedtime was discontinued and nifedipine started.  She reports her home BP is under descent control after starting nifedipine XL 30 mg BID and continuing lisinopril at 20 mg BID.  However, she complains of intermittent lower extremity edema and a dry cough.  She reports that her In the past, she reported labile BP especially during social events but this does not seem to an issue at this time.  She also reports was celexa increasd a few years back and she has been on  lisinopril for years. Her daily fluid intake usually is 16 oz + carbonated 12 oz + another 16 oz.  She is feeling well with the exception of occasional fatigue.  Much time was spent discussing her albuminuria which did improve after overcoming a viral illness and restarting lisinopril (~1000 mg/g to ~190 mg/g).          Review of Systems:   Pertinent items are noted in HPI or as below, remainder of complete ROS is negative.      Active Medications:   Current Outpatient Medications   Medication Sig Dispense Refill     Cholecalciferol (VITAMIN D3 PO) Take 1,000 Units by mouth daily       citalopram (CELEXA) 20 MG tablet TAKE ONE TABLET BY MOUTH ONCE DAILY 90 tablet 3     lisinopril (ZESTRIL) 20 MG tablet Take 1 tablet (20 mg) by mouth 2 times daily 180 tablet 3     magnesium 250 MG tablet Take 1 tablet by mouth daily       NIFEdipine ER OSMOTIC (PROCARDIA XL) 30 MG 24 hr tablet Take 1 tablet (30 mg) by mouth 2 times daily 60 tablet 11        Allergies:   Zoloft      Past Medical History:  Generalized anxiety disorder   Hypertension, controlled  Microalbuminuria  Vitamin D deficiency     Past Surgical History:  Reimplantation of ureters    Family History:   No family history of kidney disease. Family hypertension (mother, brother, and father).      Social History:   The patient has never smoked. Denies alcohol use.     Physical Exam:  Physical exam deferred as encounter ws a video visit.      Laboratory:  CMP  Recent Labs   Lab Test 05/09/22  1136 04/25/22  1823 07/19/21  1713 06/08/21  1652 10/05/20  0822 07/02/20  1545 02/29/20  0910 12/27/18  0229 10/26/16  1452 06/20/16  0854   * 129* 132* 128* 131* 131* 130* 133   < > 138   POTASSIUM 4.2 4.6 3.7 4.3 4.2 3.9 4.5 4.2   < > 3.9   CHLORIDE 97 97 99 97 99 99 98 100   < > 104   CO2 25 25 25 24 26 29 26 30   < > 27   ANIONGAP 8 7 8 7 6 3 6 4   < > 7   * 98 94 82 80 86 98 96   < > 87   BUN 10 15 11 12 14 14 14 14   < > 9   CR 0.62 0.74 0.67 0.59 0.76 0.68 0.58 0.64   < > 0.71   GFRESTIMATED >90 >90 >90 >90 88 >90 >90 >90   < > 86   GFRESTBLACK  --   --   --  >90 >90 >90 >90 >90   < > >90  African American GFR Calc     KRIS 9.0 9.4 9.1 9.1 9.3 9.1 9.1 8.9   < > 8.8   PHOS 3.6 4.1  --   --  3.7  --   --  4.2  --   --    PROTTOTAL  --   --  8.2 8.5  --   --  8.7  --   --   8.4   ALBUMIN 4.1 4.3 4.0 4.1 4.1  --  4.1 3.9  --  4.0   BILITOTAL  --   --  0.3 0.3  --   --  0.4  --   --  0.4   ALKPHOS  --   --  93 96  --   --  87  --   --  68   AST  --   --  23 23  --   --  25  --   --  22   ALT  --   --  24 28  --   --  32  --   --  24    < > = values in this interval not displayed.     CBC  Recent Labs   Lab Test 04/25/22  1823 07/19/21  1714 06/08/21  1652 10/05/20  0822 02/29/20  0910   HGB 13.1 12.3 12.4 13.3 13.7   WBC  --  6.7 8.0 5.5 5.8   RBC  --  3.99 3.96 4.26 4.43   HCT  --  36.0 36.5 39.3 40.3   MCV  --  90 92 92 91   MCH  --  30.8 31.3 31.2 30.9   MCHC  --  34.2 34.0 33.8 34.0   RDW  --  11.8 11.6 12.1 12.1   PLT  --  341 336 316 328       URINE STUDIES  Recent Labs   Lab Test 10/05/20  0823 12/27/18  1433 12/22/17  1521   COLOR Yellow Straw Yellow   APPEARANCE Clear Clear Clear   URINEGLC Negative Negative Negative   URINEBILI Negative Negative Negative   URINEKETONE Negative Negative 15*   SG 1.020 1.008 1.015   UBLD Trace* Negative Small*   URINEPH 7.5* 7.0 6.5   PROTEIN 30* Negative Negative   UROBILINOGEN 0.2  --  0.2   NITRITE Negative Negative Negative   LEUKEST Negative Negative Negative   RBCU O - 2 1 O - 2   WBCU 0 - 5 <1 O - 2     Recent Labs   Lab Test 04/25/22  1829 06/08/21  1652 10/05/20  0823 07/02/20  1546 12/27/18  1433   UTPG 1.43* 0.62* 0.29* 0.58* 0.38*     PTH  No lab results found.  IRON STUDIES   Recent Labs   Lab Test 06/17/15  1550 10/28/14  1453 10/09/14  1530   IRON 80  --   --      --   --    IRONSAT 20  --   --    YUN 12 18 3*      All above labs were reviewed by me.     Total telephone time was 30 minutes.      Sam Freire MD  [phone]

## 2022-07-05 DIAGNOSIS — F41.1 GENERALIZED ANXIETY DISORDER: ICD-10-CM

## 2022-07-07 RX ORDER — CITALOPRAM HYDROBROMIDE 20 MG/1
TABLET ORAL
Qty: 90 TABLET | Refills: 1 | Status: SHIPPED | OUTPATIENT
Start: 2022-07-07 | End: 2023-01-12

## 2022-08-09 DIAGNOSIS — I10 HYPERTENSION, ESSENTIAL: ICD-10-CM

## 2022-08-12 RX ORDER — CARVEDILOL 25 MG/1
25 TABLET ORAL 2 TIMES DAILY WITH MEALS
Qty: 60 TABLET | Refills: 11 | Status: SHIPPED | OUTPATIENT
Start: 2022-08-12 | End: 2023-03-20 | Stop reason: ALTCHOICE

## 2022-08-26 DIAGNOSIS — R80.9 ALBUMINURIA: ICD-10-CM

## 2022-08-26 DIAGNOSIS — I10 HYPERTENSION, ESSENTIAL: ICD-10-CM

## 2022-08-26 NOTE — TELEPHONE ENCOUNTER
Date: 8/26/2022    Time of Call: 3:36 PM     Diagnosis:  HTN, Albuminuria     [ VORB ] Ordering provider: Dr. Sam Freire  Order: start spironolactone 25 mg PO daily     Order received by: SAPNA Coy LPN  Nephrology  [phone]

## 2022-08-27 RX ORDER — SPIRONOLACTONE 25 MG/1
25 TABLET ORAL DAILY
Qty: 90 TABLET | Refills: 3 | Status: SHIPPED | OUTPATIENT
Start: 2022-08-27 | End: 2022-12-19

## 2022-08-31 DIAGNOSIS — N18.1 CKD (CHRONIC KIDNEY DISEASE) STAGE 1, GFR 90 ML/MIN OR GREATER: Primary | ICD-10-CM

## 2022-08-31 DIAGNOSIS — I10 HYPERTENSION, ESSENTIAL: ICD-10-CM

## 2022-09-11 DIAGNOSIS — N18.1 CKD (CHRONIC KIDNEY DISEASE) STAGE 1, GFR 90 ML/MIN OR GREATER: Primary | ICD-10-CM

## 2022-09-11 DIAGNOSIS — I10 HYPERTENSION, ESSENTIAL: ICD-10-CM

## 2022-10-22 ENCOUNTER — HEALTH MAINTENANCE LETTER (OUTPATIENT)
Age: 57
End: 2022-10-22

## 2022-12-19 ENCOUNTER — OFFICE VISIT (OUTPATIENT)
Dept: NEPHROLOGY | Facility: CLINIC | Age: 57
End: 2022-12-19
Attending: INTERNAL MEDICINE
Payer: COMMERCIAL

## 2022-12-19 ENCOUNTER — LAB (OUTPATIENT)
Dept: LAB | Facility: CLINIC | Age: 57
End: 2022-12-19
Payer: COMMERCIAL

## 2022-12-19 VITALS
TEMPERATURE: 98 F | WEIGHT: 181 LBS | DIASTOLIC BLOOD PRESSURE: 81 MMHG | BODY MASS INDEX: 26.73 KG/M2 | SYSTOLIC BLOOD PRESSURE: 135 MMHG | HEART RATE: 50 BPM | OXYGEN SATURATION: 96 %

## 2022-12-19 DIAGNOSIS — R80.9 ALBUMINURIA: ICD-10-CM

## 2022-12-19 DIAGNOSIS — N18.1 CKD (CHRONIC KIDNEY DISEASE) STAGE 1, GFR 90 ML/MIN OR GREATER: ICD-10-CM

## 2022-12-19 DIAGNOSIS — I10 HYPERTENSION, ESSENTIAL: ICD-10-CM

## 2022-12-19 LAB
ALBUMIN MFR UR ELPH: 13.3 MG/DL
ALBUMIN SERPL BCG-MCNC: 4.4 G/DL (ref 3.5–5.2)
ANION GAP SERPL CALCULATED.3IONS-SCNC: 9 MMOL/L (ref 7–15)
BUN SERPL-MCNC: 11.2 MG/DL (ref 6–20)
CALCIUM SERPL-MCNC: 9.1 MG/DL (ref 8.6–10)
CHLORIDE SERPL-SCNC: 96 MMOL/L (ref 98–107)
CREAT SERPL-MCNC: 0.71 MG/DL (ref 0.51–0.95)
CREAT UR-MCNC: 43.2 MG/DL
CREAT UR-MCNC: 43.8 MG/DL
DEPRECATED HCO3 PLAS-SCNC: 26 MMOL/L (ref 22–29)
GFR SERPL CREATININE-BSD FRML MDRD: >90 ML/MIN/1.73M2
GLUCOSE SERPL-MCNC: 83 MG/DL (ref 70–99)
HGB BLD-MCNC: 12.1 G/DL (ref 11.7–15.7)
MICROALBUMIN UR-MCNC: 50.1 MG/L
MICROALBUMIN/CREAT UR: 114.38 MG/G CR (ref 0–25)
PHOSPHATE SERPL-MCNC: 3.6 MG/DL (ref 2.5–4.5)
POTASSIUM SERPL-SCNC: 4.6 MMOL/L (ref 3.4–5.3)
PROT/CREAT 24H UR: 0.31 MG/MG CR (ref 0–0.2)
PTH-INTACT SERPL-MCNC: 60 PG/ML (ref 15–65)
SODIUM SERPL-SCNC: 131 MMOL/L (ref 136–145)

## 2022-12-19 PROCEDURE — 80069 RENAL FUNCTION PANEL: CPT | Performed by: PATHOLOGY

## 2022-12-19 PROCEDURE — 83970 ASSAY OF PARATHORMONE: CPT | Performed by: PATHOLOGY

## 2022-12-19 PROCEDURE — 84156 ASSAY OF PROTEIN URINE: CPT | Performed by: PATHOLOGY

## 2022-12-19 PROCEDURE — 85018 HEMOGLOBIN: CPT | Performed by: PATHOLOGY

## 2022-12-19 PROCEDURE — 82306 VITAMIN D 25 HYDROXY: CPT | Mod: 90 | Performed by: PATHOLOGY

## 2022-12-19 PROCEDURE — G0463 HOSPITAL OUTPT CLINIC VISIT: HCPCS | Performed by: INTERNAL MEDICINE

## 2022-12-19 PROCEDURE — 99000 SPECIMEN HANDLING OFFICE-LAB: CPT | Performed by: PATHOLOGY

## 2022-12-19 PROCEDURE — G0463 HOSPITAL OUTPT CLINIC VISIT: HCPCS

## 2022-12-19 PROCEDURE — 36415 COLL VENOUS BLD VENIPUNCTURE: CPT | Performed by: PATHOLOGY

## 2022-12-19 PROCEDURE — 99214 OFFICE O/P EST MOD 30 MIN: CPT | Performed by: INTERNAL MEDICINE

## 2022-12-19 PROCEDURE — 82043 UR ALBUMIN QUANTITATIVE: CPT | Mod: 90 | Performed by: PATHOLOGY

## 2022-12-19 RX ORDER — METOPROLOL SUCCINATE 100 MG/1
100 TABLET, EXTENDED RELEASE ORAL DAILY
Qty: 30 TABLET | Refills: 3 | Status: SHIPPED | OUTPATIENT
Start: 2022-12-19 | End: 2023-04-28

## 2022-12-19 RX ORDER — SPIRONOLACTONE 25 MG/1
50 TABLET ORAL DAILY
Qty: 90 TABLET | Refills: 3 | Status: SHIPPED | OUTPATIENT
Start: 2022-12-19 | End: 2023-09-06

## 2022-12-19 ASSESSMENT — PAIN SCALES - GENERAL: PAINLEVEL: NO PAIN (0)

## 2022-12-19 NOTE — PROGRESS NOTES
Nephrology Clinic - Telephone Visit    Sam Freire MD  2022     Name: Josy John  MRN: [id number]  Age: 56 year old  : 1965  Referring provider: Samantha Mcdonald     Assessment and Plan:     CKD, stage 1/albuminuria: She has preserved kidney function with minimal albuminuria.   She has no microscopic hematuria or evidence of active urinary sediment.  I suspect she sustained some renal injury at an early age as a result of recurrent UTI/pyelonephritis form urinary reflux (that has since been corrected with ureteral reimplantation).  As a result, she likely has some mild scarring resulting in minimal albuminuria (452 mg/g in 2020 and now down to ~114 mg/g on losartan).      - renal ultrasound performed and essentially normal  - continue RAAS blockade for renoprotection/albuminuria and hypertension with losartan at 50 mg BID (lisinopril was discontinued due to possible dry cough)  - return to clinic in 6 months     Hypertension: Primary. Appears more near goal of at least <130/80 especially based on 24 ambulatory blood pressure monitoring.  She also had an excellent dipping pattern.  Regardless, she does have very high blood pressures at times that has led to an ER visit such that more aggressive control seems warranted.  She does have some intolerances to antihypertensive medications that has led to the discontinuation of thiazides (hyponatremia), amlodipine (edema), nifedipine (edema), lisinopril (cough) and now carvedilol (fatigue)      -will discontinue carvedilol and start metoprolol XL at 100 mg daiy; we will also increase spironolactone to 50 mg daily     Hyponatermia: Sodium in the low 130's but stable.  Asymptomatic.    -will evaluate with urine studies at her f/u visit in 2 months.  If she becomes frankly hyponatremic then we will see her sooner.  Suspect she has resest osmostat.        Follow-up: RTC in 6 months     HPI:   Josy John is a 56 year old woman with  a history of hypertension (controlled on lisinopril and amlodipine) who was referred for nephrology consult by her PCP due to proteinuria. The patient reports when she was 5 y.o. She had recurrent UTI/pyelonephritis for approximately a year and finally had ureteral implantation to prevent suspected ureteral reflux. Since then, she has not had any significant history of UTI. She states she was diagnosed with hypertension many years ago. She was first started on hydrochlorothiazide but then switched to amlodipine and lisinopril. She previously was on 20 mg lisinopril once daily, which caused her to have significant muscle cramping, so this was decreased to 10 mg once daily but later returned to 20 mg daily.     She was seen by her PCP and noted to have proteinuria and referred to nephrology.  The patient does express concern for why these proteins are showing up in her urine. She notes she had occipital headaches when she wakes up in the morning last year and wonders if this is related to hormone levels or caffeine intake. More recenly she reports having migraines that she thinks may be related to undergoing menopause.  She denies any significant pain medication use, though she does use tylenol during arthritis flares. She also denies any history of kidney stones or diabetes.  She has no family history of kidney disease.  Her lisinopril remains at 20 mg daily and she continues on amlodipine at 2.5 mg dialy.  Her home BP varies and is sometimes in closer to 138/80's more recently.  She continues on a low salt diet and exercises regularly.            Interval History:  Mrs. John is currently on lisinopril 20 mg BID with a home BP of 120//90.  She reports amlodpine din't help though continues at 2.5 mg at bedtime.  She reports labile BP especially during social events.  She also reports was celexa increasd a few years back and she has been on  lisinopril for years. Her daily fluid intake usually is 16 oz +  carbonated 12 oz + another 16 oz.  She is feeling well with the exception of occasional fatigue which she is wondering if it is due to carvedilol.  Her symptoms did improve when she held it. We elected to start metoprolol instead and increase spironolactone.          Review of Systems:   Pertinent items are noted in HPI or as below, remainder of complete ROS is negative.      Active Medications:   Current Outpatient Medications   Medication Sig Dispense Refill     carvedilol (COREG) 25 MG tablet Take 1 tablet (25 mg) by mouth 2 times daily (with meals) 60 tablet 11     Cholecalciferol (VITAMIN D3 PO) Take 1,000 Units by mouth daily       citalopram (CELEXA) 20 MG tablet TAKE ONE TABLET BY MOUTH ONCE DAILY 90 tablet 1     losartan (COZAAR) 50 MG tablet Take 1 tablet (50 mg) by mouth 2 times daily 60 tablet 11     magnesium 250 MG tablet Take 1 tablet by mouth daily       spironolactone (ALDACTONE) 25 MG tablet Take 1 tablet (25 mg) by mouth daily 90 tablet 3        Allergies:   Zoloft      Past Medical History:  Generalized anxiety disorder   Hypertension, controlled  Microalbuminuria  Vitamin D deficiency     Past Surgical History:  Reimplantation of ureters    Family History:   No family history of kidney disease. Family hypertension (mother, brother, and father).      Social History:   The patient has never smoked. Denies alcohol use.     Physical Exam:  /81   Pulse 50   Temp 98  F (36.7  C)   Wt 82.1 kg (181 lb)   SpO2 96%   BMI 26.73 kg/m     GENERAL APPEARANCE: alert and no distress  EYES: nonicteric  HENT: mouth without ulcers or lesions  NECK: supple, no adenopathy  RESP: lungs clear to auscultation   CV: regular rhythm, normal rate, no rub  ABDOMEN: soft, nontender, nondistended  Extremities: no edema  MS: no evidence of inflammation in joints, no muscle tenderness  SKIN: no concerning rash  NEURO: mentation intact and speech normal  PSYCH: affect normal/bright     Laboratory:  CMP  Recent Labs    Lab Test 12/19/22  1024 05/09/22  1136 04/25/22  1823 07/19/21  1713 06/08/21  1652 10/05/20  0822 07/02/20  1545 02/29/20  0910 10/26/16  1452 06/20/16  0854   * 130* 129* 132* 128* 131* 131* 130*   < > 138   POTASSIUM 4.6 4.2 4.6 3.7 4.3 4.2 3.9 4.5   < > 3.9   CHLORIDE 96* 97 97 99 97 99 99 98   < > 104   CO2 26 25 25 25 24 26 29 26   < > 27   ANIONGAP 9 8 7 8 7 6 3 6   < > 7   GLC 83 102* 98 94 82 80 86 98   < > 87   BUN 11.2 10 15 11 12 14 14 14   < > 9   CR 0.71 0.62 0.74 0.67 0.59 0.76 0.68 0.58   < > 0.71   GFRESTIMATED >90 >90 >90 >90 >90 88 >90 >90   < > 86   GFRESTBLACK  --   --   --   --  >90 >90 >90 >90   < > >90  African American GFR Calc     KRIS 9.1 9.0 9.4 9.1 9.1 9.3 9.1 9.1   < > 8.8   PHOS 3.6 3.6 4.1  --   --  3.7  --   --    < >  --    PROTTOTAL  --   --   --  8.2 8.5  --   --  8.7  --  8.4   ALBUMIN 4.4 4.1 4.3 4.0 4.1 4.1  --  4.1   < > 4.0   BILITOTAL  --   --   --  0.3 0.3  --   --  0.4  --  0.4   ALKPHOS  --   --   --  93 96  --   --  87  --  68   AST  --   --   --  23 23  --   --  25  --  22   ALT  --   --   --  24 28  --   --  32  --  24    < > = values in this interval not displayed.     CBC  Recent Labs   Lab Test 12/19/22  1024 04/25/22  1823 07/19/21  1714 06/08/21  1652 10/05/20  0822 02/29/20  0910   HGB 12.1 13.1 12.3 12.4 13.3 13.7   WBC  --   --  6.7 8.0 5.5 5.8   RBC  --   --  3.99 3.96 4.26 4.43   HCT  --   --  36.0 36.5 39.3 40.3   MCV  --   --  90 92 92 91   MCH  --   --  30.8 31.3 31.2 30.9   MCHC  --   --  34.2 34.0 33.8 34.0   RDW  --   --  11.8 11.6 12.1 12.1   PLT  --   --  341 336 316 328       URINE STUDIES  Recent Labs   Lab Test 10/05/20  0823 12/27/18  1433 12/22/17  1521   COLOR Yellow Straw Yellow   APPEARANCE Clear Clear Clear   URINEGLC Negative Negative Negative   URINEBILI Negative Negative Negative   URINEKETONE Negative Negative 15*   SG 1.020 1.008 1.015   UBLD Trace* Negative Small*   URINEPH 7.5* 7.0 6.5   PROTEIN 30* Negative Negative    UROBILINOGEN 0.2  --  0.2   NITRITE Negative Negative Negative   LEUKEST Negative Negative Negative   RBCU O - 2 1 O - 2   WBCU 0 - 5 <1 O - 2     Recent Labs   Lab Test 04/25/22  1829 06/08/21  1652 10/05/20  0823 07/02/20  1546 12/27/18  1433   UTPG 1.43* 0.62* 0.29* 0.58* 0.38*     PTH  Recent Labs   Lab Test 12/19/22  1024   PTHI 60     IRON STUDIES   Recent Labs   Lab Test 06/17/15  1550 10/28/14  1453 10/09/14  1530   IRON 80  --   --      --   --    IRONSAT 20  --   --    YUN 12 18 3*      All above labs were reviewed by me.   Sam Freire MD

## 2022-12-19 NOTE — NURSING NOTE
Chief Complaint   Patient presents with     RECHECK     Return visit.     Blood pressure 135/81, pulse 50, temperature 98  F (36.7  C), weight 82.1 kg (181 lb), SpO2 96 %, not currently breastfeeding.    FALGUNI COPELAND

## 2022-12-19 NOTE — PATIENT INSTRUCTIONS
-Please discontinue carvedilol  -Please start metoprolol XL at 100 mg daily  -Please increase your spironolactone to 50 mg daily  -Please continue losartan at 50 mg 2x/day  -Otherwise, please return to clinic in 6 months

## 2022-12-19 NOTE — LETTER
2022       RE: Josy John  [address] [place] [address]     Dear Colleague,    Thank you for referring your patient, Josy John, to the [facility] [place] [facility]. Please see a copy of my visit note below.        Nephrology Clinic - Telephone Visit    Sam Freire MD  2022     Name: Josy John  MRN: [id number]  Age: 56 year old  : 1965  Referring provider: Samantha Mcdonald     Assessment and Plan:     CKD, stage 1/albuminuria: She has preserved kidney function with minimal albuminuria.   She has no microscopic hematuria or evidence of active urinary sediment.  I suspect she sustained some renal injury at an early age as a result of recurrent UTI/pyelonephritis form urinary reflux (that has since been corrected with ureteral reimplantation).  As a result, she likely has some mild scarring resulting in minimal albuminuria (452 mg/g in 2020 and now down to ~114 mg/g on losartan).      - renal ultrasound performed and essentially normal  - continue RAAS blockade for renoprotection/albuminuria and hypertension with losartan at 50 mg BID (lisinopril was discontinued due to possible dry cough)  - return to clinic in 6 months     Hypertension: Primary. Appears more near goal of at least <130/80 especially based on 24 ambulatory blood pressure monitoring.  She also had an excellent dipping pattern.  Regardless, she does have very high blood pressures at times that has led to an ER visit such that more aggressive control seems warranted.  She does have some intolerances to antihypertensive medications that has led to the discontinuation of thiazides (hyponatremia), amlodipine (edema), nifedipine (edema), lisinopril (cough) and now carvedilol (fatigue)      -will discontinue carvedilol and start metoprolol XL at 100 mg daiy; we will also increase spironolactone to 50 mg daily      Hyponatermia: Sodium in the low 130's but stable.  Asymptomatic.    -will evaluate with urine studies at her f/u visit in 2 months.  If she becomes frankly hyponatremic then we will see her sooner.  Suspect she has resest osmostat.        Follow-up: RTC in 6 months     HPI:   Josy John is a 56 year old woman with a history of hypertension (controlled on lisinopril and amlodipine) who was referred for nephrology consult by her PCP due to proteinuria. The patient reports when she was 5 y.o. She had recurrent UTI/pyelonephritis for approximately a year and finally had ureteral implantation to prevent suspected ureteral reflux. Since then, she has not had any significant history of UTI. She states she was diagnosed with hypertension many years ago. She was first started on hydrochlorothiazide but then switched to amlodipine and lisinopril. She previously was on 20 mg lisinopril once daily, which caused her to have significant muscle cramping, so this was decreased to 10 mg once daily but later returned to 20 mg daily.     She was seen by her PCP and noted to have proteinuria and referred to nephrology.  The patient does express concern for why these proteins are showing up in her urine. She notes she had occipital headaches when she wakes up in the morning last year and wonders if this is related to hormone levels or caffeine intake. More recenly she reports having migraines that she thinks may be related to undergoing menopause.  She denies any significant pain medication use, though she does use tylenol during arthritis flares. She also denies any history of kidney stones or diabetes.  She has no family history of kidney disease.  Her lisinopril remains at 20 mg daily and she continues on amlodipine at 2.5 mg dialy.  Her home BP varies and is sometimes in closer to 138/80's more recently.  She continues on a low salt diet and exercises regularly.            Interval History:  Mrs. John is currently on  lisinopril 20 mg BID with a home BP of 120//90.  She reports amlodpine din't help though continues at 2.5 mg at bedtime.  She reports labile BP especially during social events.  She also reports was celexa increasd a few years back and she has been on  lisinopril for years. Her daily fluid intake usually is 16 oz + carbonated 12 oz + another 16 oz.  She is feeling well with the exception of occasional fatigue which she is wondering if it is due to carvedilol.  Her symptoms did improve when she held it. We elected to start metoprolol instead and increase spironolactone.          Review of Systems:   Pertinent items are noted in HPI or as below, remainder of complete ROS is negative.      Active Medications:   Current Outpatient Medications   Medication Sig Dispense Refill     carvedilol (COREG) 25 MG tablet Take 1 tablet (25 mg) by mouth 2 times daily (with meals) 60 tablet 11     Cholecalciferol (VITAMIN D3 PO) Take 1,000 Units by mouth daily       citalopram (CELEXA) 20 MG tablet TAKE ONE TABLET BY MOUTH ONCE DAILY 90 tablet 1     losartan (COZAAR) 50 MG tablet Take 1 tablet (50 mg) by mouth 2 times daily 60 tablet 11     magnesium 250 MG tablet Take 1 tablet by mouth daily       spironolactone (ALDACTONE) 25 MG tablet Take 1 tablet (25 mg) by mouth daily 90 tablet 3        Allergies:   Zoloft      Past Medical History:  Generalized anxiety disorder   Hypertension, controlled  Microalbuminuria  Vitamin D deficiency     Past Surgical History:  Reimplantation of ureters    Family History:   No family history of kidney disease. Family hypertension (mother, brother, and father).      Social History:   The patient has never smoked. Denies alcohol use.     Physical Exam:  /81   Pulse 50   Temp 98  F (36.7  C)   Wt 82.1 kg (181 lb)   SpO2 96%   BMI 26.73 kg/m     GENERAL APPEARANCE: alert and no distress  EYES: nonicteric  HENT: mouth without ulcers or lesions  NECK: supple, no adenopathy  RESP: lungs  clear to auscultation   CV: regular rhythm, normal rate, no rub  ABDOMEN: soft, nontender, nondistended  Extremities: no edema  MS: no evidence of inflammation in joints, no muscle tenderness  SKIN: no concerning rash  NEURO: mentation intact and speech normal  PSYCH: affect normal/bright     Laboratory:  CMP  Recent Labs   Lab Test 12/19/22  1024 05/09/22  1136 04/25/22  1823 07/19/21  1713 06/08/21  1652 10/05/20  0822 07/02/20  1545 02/29/20  0910 10/26/16  1452 06/20/16  0854   * 130* 129* 132* 128* 131* 131* 130*   < > 138   POTASSIUM 4.6 4.2 4.6 3.7 4.3 4.2 3.9 4.5   < > 3.9   CHLORIDE 96* 97 97 99 97 99 99 98   < > 104   CO2 26 25 25 25 24 26 29 26   < > 27   ANIONGAP 9 8 7 8 7 6 3 6   < > 7   GLC 83 102* 98 94 82 80 86 98   < > 87   BUN 11.2 10 15 11 12 14 14 14   < > 9   CR 0.71 0.62 0.74 0.67 0.59 0.76 0.68 0.58   < > 0.71   GFRESTIMATED >90 >90 >90 >90 >90 88 >90 >90   < > 86   GFRESTBLACK  --   --   --   --  >90 >90 >90 >90   < > >90  African American GFR Calc     KRIS 9.1 9.0 9.4 9.1 9.1 9.3 9.1 9.1   < > 8.8   PHOS 3.6 3.6 4.1  --   --  3.7  --   --    < >  --    PROTTOTAL  --   --   --  8.2 8.5  --   --  8.7  --  8.4   ALBUMIN 4.4 4.1 4.3 4.0 4.1 4.1  --  4.1   < > 4.0   BILITOTAL  --   --   --  0.3 0.3  --   --  0.4  --  0.4   ALKPHOS  --   --   --  93 96  --   --  87  --  68   AST  --   --   --  23 23  --   --  25  --  22   ALT  --   --   --  24 28  --   --  32  --  24    < > = values in this interval not displayed.     CBC  Recent Labs   Lab Test 12/19/22  1024 04/25/22  1823 07/19/21  1714 06/08/21  1652 10/05/20  0822 02/29/20  0910   HGB 12.1 13.1 12.3 12.4 13.3 13.7   WBC  --   --  6.7 8.0 5.5 5.8   RBC  --   --  3.99 3.96 4.26 4.43   HCT  --   --  36.0 36.5 39.3 40.3   MCV  --   --  90 92 92 91   MCH  --   --  30.8 31.3 31.2 30.9   MCHC  --   --  34.2 34.0 33.8 34.0   RDW  --   --  11.8 11.6 12.1 12.1   PLT  --   --  341 336 316 328       URINE STUDIES  Recent Labs   Lab Test  10/05/20  0823 12/27/18  1433 12/22/17  1521   COLOR Yellow Straw Yellow   APPEARANCE Clear Clear Clear   URINEGLC Negative Negative Negative   URINEBILI Negative Negative Negative   URINEKETONE Negative Negative 15*   SG 1.020 1.008 1.015   UBLD Trace* Negative Small*   URINEPH 7.5* 7.0 6.5   PROTEIN 30* Negative Negative   UROBILINOGEN 0.2  --  0.2   NITRITE Negative Negative Negative   LEUKEST Negative Negative Negative   RBCU O - 2 1 O - 2   WBCU 0 - 5 <1 O - 2     Recent Labs   Lab Test 04/25/22  1829 06/08/21  1652 10/05/20  0823 07/02/20  1546 12/27/18  1433   UTPG 1.43* 0.62* 0.29* 0.58* 0.38*     PTH  Recent Labs   Lab Test 12/19/22  1024   PTHI 60     IRON STUDIES   Recent Labs   Lab Test 06/17/15  1550 10/28/14  1453 10/09/14  1530   IRON 80  --   --      --   --    IRONSAT 20  --   --    YUN 12 18 3*      All above labs were reviewed by me.   Sam Freire MD

## 2022-12-21 LAB
DEPRECATED CALCIDIOL+CALCIFEROL SERPL-MC: <39 UG/L (ref 20–75)
VITAMIN D2 SERPL-MCNC: <5 UG/L
VITAMIN D3 SERPL-MCNC: 34 UG/L

## 2023-01-10 DIAGNOSIS — F41.1 GENERALIZED ANXIETY DISORDER: ICD-10-CM

## 2023-01-12 RX ORDER — CITALOPRAM HYDROBROMIDE 20 MG/1
TABLET ORAL
Qty: 90 TABLET | Refills: 3 | Status: SHIPPED | OUTPATIENT
Start: 2023-01-12 | End: 2024-01-10

## 2023-01-12 NOTE — TELEPHONE ENCOUNTER
Routing refill request to provider for review/approval because:  Allergy Warning    Tiara WORLEY RN, BSN

## 2023-03-20 ENCOUNTER — OFFICE VISIT (OUTPATIENT)
Dept: PEDIATRICS | Facility: CLINIC | Age: 58
End: 2023-03-20
Payer: COMMERCIAL

## 2023-03-20 VITALS
DIASTOLIC BLOOD PRESSURE: 80 MMHG | OXYGEN SATURATION: 100 % | WEIGHT: 184.9 LBS | HEART RATE: 52 BPM | TEMPERATURE: 97.4 F | HEIGHT: 69 IN | SYSTOLIC BLOOD PRESSURE: 126 MMHG | BODY MASS INDEX: 27.39 KG/M2 | RESPIRATION RATE: 20 BRPM

## 2023-03-20 DIAGNOSIS — N18.1 CHRONIC KIDNEY DISEASE, STAGE 1: ICD-10-CM

## 2023-03-20 DIAGNOSIS — R06.89 ALTERED BREATHING PATTERN: ICD-10-CM

## 2023-03-20 DIAGNOSIS — R06.00 DYSPNEA, UNSPECIFIED TYPE: ICD-10-CM

## 2023-03-20 DIAGNOSIS — Z00.00 ROUTINE HISTORY AND PHYSICAL EXAMINATION OF ADULT: Primary | ICD-10-CM

## 2023-03-20 DIAGNOSIS — Z12.31 VISIT FOR SCREENING MAMMOGRAM: ICD-10-CM

## 2023-03-20 DIAGNOSIS — Z83.49 FAMILY HISTORY OF HYPOTHYROIDISM: ICD-10-CM

## 2023-03-20 DIAGNOSIS — F41.1 GENERALIZED ANXIETY DISORDER: ICD-10-CM

## 2023-03-20 DIAGNOSIS — R80.1 PERSISTENT PROTEINURIA: ICD-10-CM

## 2023-03-20 DIAGNOSIS — R10.13 EPIGASTRIC PAIN: ICD-10-CM

## 2023-03-20 DIAGNOSIS — I10 BENIGN HYPERTENSION: ICD-10-CM

## 2023-03-20 PROCEDURE — 99214 OFFICE O/P EST MOD 30 MIN: CPT | Mod: 25 | Performed by: PEDIATRICS

## 2023-03-20 PROCEDURE — 99396 PREV VISIT EST AGE 40-64: CPT | Performed by: PEDIATRICS

## 2023-03-20 SDOH — ECONOMIC STABILITY: INCOME INSECURITY: HOW HARD IS IT FOR YOU TO PAY FOR THE VERY BASICS LIKE FOOD, HOUSING, MEDICAL CARE, AND HEATING?: NOT VERY HARD

## 2023-03-20 SDOH — ECONOMIC STABILITY: TRANSPORTATION INSECURITY
IN THE PAST 12 MONTHS, HAS LACK OF TRANSPORTATION KEPT YOU FROM MEETINGS, WORK, OR FROM GETTING THINGS NEEDED FOR DAILY LIVING?: NO

## 2023-03-20 SDOH — HEALTH STABILITY: PHYSICAL HEALTH: ON AVERAGE, HOW MANY DAYS PER WEEK DO YOU ENGAGE IN MODERATE TO STRENUOUS EXERCISE (LIKE A BRISK WALK)?: 5 DAYS

## 2023-03-20 SDOH — HEALTH STABILITY: PHYSICAL HEALTH: ON AVERAGE, HOW MANY MINUTES DO YOU ENGAGE IN EXERCISE AT THIS LEVEL?: 50 MIN

## 2023-03-20 SDOH — ECONOMIC STABILITY: FOOD INSECURITY: WITHIN THE PAST 12 MONTHS, YOU WORRIED THAT YOUR FOOD WOULD RUN OUT BEFORE YOU GOT MONEY TO BUY MORE.: NEVER TRUE

## 2023-03-20 SDOH — ECONOMIC STABILITY: TRANSPORTATION INSECURITY
IN THE PAST 12 MONTHS, HAS THE LACK OF TRANSPORTATION KEPT YOU FROM MEDICAL APPOINTMENTS OR FROM GETTING MEDICATIONS?: NO

## 2023-03-20 SDOH — ECONOMIC STABILITY: FOOD INSECURITY: WITHIN THE PAST 12 MONTHS, THE FOOD YOU BOUGHT JUST DIDN'T LAST AND YOU DIDN'T HAVE MONEY TO GET MORE.: NEVER TRUE

## 2023-03-20 SDOH — ECONOMIC STABILITY: INCOME INSECURITY: IN THE LAST 12 MONTHS, WAS THERE A TIME WHEN YOU WERE NOT ABLE TO PAY THE MORTGAGE OR RENT ON TIME?: NO

## 2023-03-20 ASSESSMENT — SOCIAL DETERMINANTS OF HEALTH (SDOH)
IN A TYPICAL WEEK, HOW MANY TIMES DO YOU TALK ON THE PHONE WITH FAMILY, FRIENDS, OR NEIGHBORS?: THREE TIMES A WEEK
HOW OFTEN DO YOU ATTEND CHURCH OR RELIGIOUS SERVICES?: PATIENT DECLINED
HOW OFTEN DO YOU GET TOGETHER WITH FRIENDS OR RELATIVES?: ONCE A WEEK
DO YOU BELONG TO ANY CLUBS OR ORGANIZATIONS SUCH AS CHURCH GROUPS UNIONS, FRATERNAL OR ATHLETIC GROUPS, OR SCHOOL GROUPS?: NO

## 2023-03-20 ASSESSMENT — LIFESTYLE VARIABLES
AUDIT-C TOTAL SCORE: 1
HOW OFTEN DO YOU HAVE A DRINK CONTAINING ALCOHOL: MONTHLY OR LESS
HOW OFTEN DO YOU HAVE SIX OR MORE DRINKS ON ONE OCCASION: NEVER
HOW MANY STANDARD DRINKS CONTAINING ALCOHOL DO YOU HAVE ON A TYPICAL DAY: 1 OR 2
SKIP TO QUESTIONS 9-10: 1

## 2023-03-20 ASSESSMENT — ENCOUNTER SYMPTOMS
DIARRHEA: 0
ARTHRALGIAS: 0
HEMATURIA: 0
SORE THROAT: 0
WEAKNESS: 0
CHILLS: 0
ABDOMINAL PAIN: 0
DIZZINESS: 0
NAUSEA: 0
COUGH: 0
HEMATOCHEZIA: 0
JOINT SWELLING: 0
EYE PAIN: 0
MYALGIAS: 0
PALPITATIONS: 0
HEARTBURN: 0
SHORTNESS OF BREATH: 0
NERVOUS/ANXIOUS: 0
DYSURIA: 0
FEVER: 0
FREQUENCY: 0
HEADACHES: 0
CONSTIPATION: 0
BREAST MASS: 0
PARESTHESIAS: 0

## 2023-03-20 ASSESSMENT — PAIN SCALES - GENERAL: PAINLEVEL: NO PAIN (0)

## 2023-03-20 NOTE — PATIENT INSTRUCTIONS
? Switching back to a lower dose of carvedilol (6.25mg twice daily) instead of Toprol XL for weight benefit    Think about a different long acting calcium channel blocker than amlodipine (nifedipine)    I'll touch base with Dr Freire    Think about the Shingrix vaccine    Keep up your awesome exercise    Mammogram and labs - orders waiting    [facility] Radiology [phone] - schedule CT when you have time                  Preventive Health Recommendations  Female Ages 50 - 64    Yearly exam: See your health care provider every year in order to  Review health changes.   Discuss preventive care.    Review your medicines if your doctor has prescribed any.    Get a Pap test every three years (unless you have an abnormal result and your provider advises testing more often).  If you get Pap tests with HPV test, you only need to test every 5 years, unless you have an abnormal result.   You do not need a Pap test if your uterus was removed (hysterectomy) and you have not had cancer.  You should be tested each year for STDs (sexually transmitted diseases) if you're at risk.   Have a mammogram every 1 to 2 years.  Have a colonoscopy at age 50, or have a yearly FIT test (stool test). These exams screen for colon cancer.    Have a cholesterol test every 5 years, or more often if advised.  Have a diabetes test (fasting glucose) every three years. If you are at risk for diabetes, you should have this test more often.   If you are at risk for osteoporosis (brittle bone disease), think about having a bone density scan (DEXA).    Shots: Get a flu shot each year. Get a tetanus shot every 10 years.    Nutrition:   Eat at least 5 servings of fruits and vegetables each day.  Eat whole-grain bread, whole-wheat pasta and brown rice instead of white grains and rice.  Get adequate Calcium and Vitamin D.     Lifestyle  Exercise at least 150 minutes a week (30 minutes a day, 5 days a week). This will help you control  your weight and prevent disease.  Limit alcohol to one drink per day.  No smoking.   Wear sunscreen to prevent skin cancer.   See your dentist every six months for an exam and cleaning.  See your eye doctor every 1 to 2 years.

## 2023-03-20 NOTE — PROGRESS NOTES
SUBJECTIVE:   CC: Josy is an 58 year old who presents for preventive health visit.   Patient has been advised of split billing requirements and indicates understanding: Yes  Healthy Habits:     Getting at least 3 servings of Calcium per day:  Yes    Bi-annual eye exam:  Yes    Dental care twice a year:  Yes    Sleep apnea or symptoms of sleep apnea:  None    Diet:  Regular (no restrictions) and Low salt    Frequency of exercise:  4-5 days/week    Duration of exercise:  45-60 minutes    Taking medications regularly:  Yes    Medication side effects:  Other    PHQ-2 Total Score: 0    Additional concerns today:  Yes        Today's PHQ-2 Score:   PHQ-2 ( 1999 [employer]) 3/20/2023   Q1: Little interest or pleasure in doing things 0   Q2: Feeling down, depressed or hopeless 0   PHQ-2 Score 0   PHQ-2 Total Score (12-17 Years)- Positive if 3 or more points; Administer PHQ-A if positive -   Q1: Little interest or pleasure in doing things Not at all   Q2: Feeling down, depressed or hopeless Not at all   PHQ-2 Score 0           Social History     Tobacco Use     Smoking status: Never     Smokeless tobacco: Never   Substance Use Topics     Alcohol use: Yes     Comment: rare         Alcohol Use 3/20/2023   Prescreen: >3 drinks/day or >7 drinks/week? No       Reviewed orders with patient.  Reviewed health maintenance and updated orders accordingly - Yes  Labs reviewed in EPIC  BP Readings from Last 3 Encounters:   03/20/23 126/80   12/19/22 135/81   05/23/22 133/86    Wt Readings from Last 3 Encounters:   03/20/23 83.9 kg (184 lb 14.4 oz)   12/19/22 82.1 kg (181 lb)   05/23/22 80.7 kg (178 lb)                    Breast Cancer Screening:    Breast CA Risk Assessment (FHS-7) 3/20/2023   Do you have a family history of breast, colon, or ovarian cancer? No / Unknown         Mammogram Screening: Recommended mammography every 1-2 years with patient discussion and risk factor consideration  Pertinent mammograms are reviewed under the  imaging tab.    History of abnormal Pap smear: NO - age 30- 65 PAP every 3 years recommended  PAP / HPV Latest Ref Rng & Units 7/2/2020 5/14/2009 5/6/2008   PAP (Historical) - NIL NIL NIL   HPV16 NEG:Negative Negative - -   HPV18 NEG:Negative Negative - -   HRHPV NEG:Negative Negative - -     Reviewed and updated as needed this visit by clinical staff   Tobacco  Allergies  Meds     Fam Hx          Reviewed and updated as needed this visit by Provider         [facility] Hx           HTN - started taking 1/2 toprol XL (now 50mg) due to bradycardia.   Multiple medication trials under the care of Dr Freire.  Couldn't tolerate amlodipine due to swelling, hydrochlorothiazide due to bradycardia, lisinopril due to cough.     Struggling with bradycardia and weight gain on current dosing of Toprol XL - cut down on own.   Very strong family hx of hypertension.  No concern for WANDER.    ALICIA - on citalopram and doing really well on current formulation.  Symptoms well controlled, no side effects.    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   Tolerating losartan well.  Followed by nephrology.      [facility] for Ob/GYn care and pap smears - started vaginal estrogen recently to help with pelvic organ prolapsee- sees Dr Wheat.  Pap smear and pelvic ultrasound performed 3/12/23.       Has been having difficulty with positional chest tightness and dyspnea - particularly feeling tight near diaphragm with exercise or shortly after eating.  Can't do yoga as feels pressure under xiphoid process.  No left chest pain or pressure.   Feeling more abdominal bloating in upper abdomen.    Review of Systems   Constitutional: Negative for chills and fever.   HENT: Negative for congestion, ear pain, hearing loss and sore throat.    Eyes: Negative for pain and visual disturbance.   Respiratory: Negative for cough and shortness of breath.    Cardiovascular: Negative for chest pain, palpitations and  "peripheral edema.   Gastrointestinal: Negative for abdominal pain, constipation, diarrhea, heartburn, hematochezia and nausea.   Breasts:  Negative for tenderness, breast mass and discharge.   Genitourinary: Negative for dysuria, frequency, genital sores, hematuria, pelvic pain, urgency, vaginal bleeding and vaginal discharge.   Musculoskeletal: Negative for arthralgias, joint swelling and myalgias.   Skin: Negative for rash.   Neurological: Negative for dizziness, weakness, headaches and paresthesias.   Psychiatric/Behavioral: Negative for mood changes. The patient is not nervous/anxious.        OBJECTIVE:   /80 (BP Location: Right arm, Patient Position: Right side, Cuff Size: Adult Large)   Pulse 52   Temp 97.4  F (36.3  C) (Tympanic)   Resp 20   Ht 1.744 m (5' 8.66\")   Wt 83.9 kg (184 lb 14.4 oz)   SpO2 100%   BMI 27.58 kg/m     Wt Readings from Last 4 Encounters:   03/20/23 83.9 kg (184 lb 14.4 oz)   12/19/22 82.1 kg (181 lb)   05/23/22 80.7 kg (178 lb)   04/25/22 81.6 kg (180 lb)       Physical Exam  GENERAL: healthy, alert and no distress  EYES: Eyes grossly normal to inspection, PERRL and conjunctivae and sclerae normal  HENT: ear canals and TM's normal, nose and mouth without ulcers or lesions  NECK: no adenopathy, no asymmetry, masses, or scars and thyroid normal to palpation  RESP: lungs clear to auscultation - no rales, rhonchi or wheezes  CV: regular rate and rhythm, normal S1 S2, no S3 or S4, no murmur, click or rub, no peripheral edema and peripheral pulses strong  ABDOMEN: soft, +BS, tender in epigastric region, no rebound or guarding, no masses  MS: no gross musculoskeletal defects noted, no edema  SKIN: no suspicious lesions or rashes  NEURO: Normal strength and tone, mentation intact and speech normal  PSYCH: mentation appears normal, affect normal/bright    Diagnostic Test Results:  pending    ASSESSMENT/PLAN:       ICD-10-CM    1. Routine history and physical examination of adult  " "Z00.00 Lipid panel reflex to direct LDL Fasting     Hepatic panel (Albumin, ALT, AST, Bili, Alk Phos, TP)     TSH with free T4 reflex     MA Screen Bilateral w/Heraclio    Recent renal function labs reviewed.  Recent pap smear with Dr Jarret Dominguez ordered  Colonoscopy UTD      2. Family history of hypothyroidism  Z83.49 TSH with free T4 reflex      3. Chronic kidney disease, stage 1  N18.1 Lipid panel reflex to direct LDL Fasting      4. Benign hypertension  I10 Lipid panel reflex to direct LDL Fasting     Hepatic panel (Albumin, ALT, AST, Bili, Alk Phos, TP)    Will discuss with Dr Freire and [place] back with patient regarding plan to adjust antihypertensive regimen - struggling with weight gain and bradycardia on current regimen      5. Generalized anxiety disorder  F41.1 Well controlled, continue current medications        6. Persistent proteinuria  R80.1 Greatly appreciate care from our nephrology colleagues      7. Visit for screening mammogram  Z12.31 MA Screen Bilateral w/Heraclio      8. Altered breathing pattern  R06.89 CT Chest Abdomen w/o & w Contrast    Strange symptom complex - ? Upper abdominal mass or hiatal hernia contributing to symptoms.  Plan additional imaging.        9. Epigastric pain  R10.13 CT Chest Abdomen w/o & w Contrast      10. Dyspnea, unspecified type  R06.00 CT Chest Abdomen w/o & w Contrast              COUNSELING:  Reviewed preventive health counseling, as reflected in patient instructions      BMI:   Estimated body mass index is 27.58 kg/m  as calculated from the following:    Height as of this encounter: 1.744 m (5' 8.66\").    Weight as of this encounter: 83.9 kg (184 lb 14.4 oz).   Weight management plan: Discussed healthy diet and exercise guidelines      She reports that she has never smoked. She has never used smokeless tobacco.      Pamela Clark MD  [facility]  "

## 2023-03-27 ENCOUNTER — LAB (OUTPATIENT)
Dept: LAB | Facility: CLINIC | Age: 58
End: 2023-03-27
Payer: COMMERCIAL

## 2023-03-27 DIAGNOSIS — Z00.00 ROUTINE HISTORY AND PHYSICAL EXAMINATION OF ADULT: ICD-10-CM

## 2023-03-27 DIAGNOSIS — Z83.49 FAMILY HISTORY OF HYPOTHYROIDISM: ICD-10-CM

## 2023-03-27 DIAGNOSIS — N18.1 CHRONIC KIDNEY DISEASE, STAGE 1: ICD-10-CM

## 2023-03-27 DIAGNOSIS — I10 BENIGN HYPERTENSION: ICD-10-CM

## 2023-03-27 LAB
ALBUMIN SERPL BCG-MCNC: 4.6 G/DL (ref 3.5–5.2)
ALP SERPL-CCNC: 80 U/L (ref 35–104)
ALT SERPL W P-5'-P-CCNC: 18 U/L (ref 10–35)
AST SERPL W P-5'-P-CCNC: 31 U/L (ref 10–35)
BILIRUB DIRECT SERPL-MCNC: <0.2 MG/DL (ref 0–0.3)
BILIRUB SERPL-MCNC: 0.5 MG/DL
CHOLEST SERPL-MCNC: 235 MG/DL
HDLC SERPL-MCNC: 62 MG/DL
LDLC SERPL CALC-MCNC: 146 MG/DL
NONHDLC SERPL-MCNC: 173 MG/DL
PROT SERPL-MCNC: 8.2 G/DL (ref 6.4–8.3)
TRIGL SERPL-MCNC: 136 MG/DL
TSH SERPL DL<=0.005 MIU/L-ACNC: 1.24 UIU/ML (ref 0.3–4.2)

## 2023-03-27 PROCEDURE — 80076 HEPATIC FUNCTION PANEL: CPT | Performed by: INTERNAL MEDICINE

## 2023-03-27 PROCEDURE — 36415 COLL VENOUS BLD VENIPUNCTURE: CPT | Performed by: INTERNAL MEDICINE

## 2023-03-27 PROCEDURE — 80061 LIPID PANEL: CPT | Performed by: INTERNAL MEDICINE

## 2023-03-27 PROCEDURE — 84443 ASSAY THYROID STIM HORMONE: CPT | Performed by: INTERNAL MEDICINE

## 2023-03-28 ENCOUNTER — HOSPITAL ENCOUNTER (OUTPATIENT)
Dept: MAMMOGRAPHY | Facility: CLINIC | Age: 58
Discharge: HOME | End: 2023-03-28
Attending: PEDIATRICS | Admitting: PEDIATRICS
Payer: COMMERCIAL

## 2023-03-28 DIAGNOSIS — Z12.31 VISIT FOR SCREENING MAMMOGRAM: ICD-10-CM

## 2023-03-28 DIAGNOSIS — Z00.00 ROUTINE HISTORY AND PHYSICAL EXAMINATION OF ADULT: ICD-10-CM

## 2023-03-28 PROCEDURE — 77067 SCR MAMMO BI INCL CAD: CPT

## 2023-04-04 DIAGNOSIS — N18.1 CKD (CHRONIC KIDNEY DISEASE) STAGE 1, GFR 90 ML/MIN OR GREATER: Primary | ICD-10-CM

## 2023-04-24 ENCOUNTER — VIRTUAL VISIT (OUTPATIENT)
Dept: NEPHROLOGY | Facility: CLINIC | Age: 58
End: 2023-04-24
Attending: INTERNAL MEDICINE
Payer: COMMERCIAL

## 2023-04-24 VITALS
HEART RATE: 60 BPM | WEIGHT: 186 LBS | DIASTOLIC BLOOD PRESSURE: 76 MMHG | SYSTOLIC BLOOD PRESSURE: 128 MMHG | BODY MASS INDEX: 27.74 KG/M2

## 2023-04-24 DIAGNOSIS — I10 BENIGN HYPERTENSION: Primary | ICD-10-CM

## 2023-04-24 DIAGNOSIS — R80.9 MICROALBUMINURIA: ICD-10-CM

## 2023-04-24 PROCEDURE — 99214 OFFICE O/P EST MOD 30 MIN: CPT | Mod: VID | Performed by: INTERNAL MEDICINE

## 2023-04-24 ASSESSMENT — PAIN SCALES - GENERAL: PAINLEVEL: NO PAIN (0)

## 2023-04-24 NOTE — NURSING NOTE
Is the patient currently in the state of [place]? YES    Visit mode:VIDEO    If the visit is dropped, the patient can be reconnected by: VIDEO VISIT: Text to cell phone: [phone]    Will anyone else be joining the visit? NO      How would you like to obtain your AVS? MyChart    Are changes needed to the allergy or medication list? YES, PT is taking 50mg of metoprolol       Reason for visit: VIDEO VISIT    PT did not realize that she needed labs prior to visit.  She missed the reminder emails and texts for labs.    David Melchor

## 2023-04-24 NOTE — PATIENT INSTRUCTIONS
-Please decrease metoprolol XL to 25 mg daily  -Please increase spironolactone to 75  mg daily   -Please continue losartan 100 mg daily  -Please have your kidney function, electrolytes and urine albumin checked in 1-2 weeks

## 2023-04-24 NOTE — PROGRESS NOTES
Nephrology Clinic - Video Visit    Sam Freire MD  2023     Name: Josy John  MRN: [id number]  Age: 58 year old  : 1965  Referring provider: Samantha Mcdonald     Assessment and Plan:     CKD, stage 1/albuminuria: She has preserved kidney function with minimal albuminuria.   She has no microscopic hematuria or evidence of active urinary sediment.  I suspect she sustained some renal injury at an early age as a result of recurrent UTI/pyelonephritis form urinary reflux (that has since been corrected with ureteral reimplantation).  As a result, she likely has some mild scarring resulting in minimal albuminuria (452 mg/g in 2020 and now down to ~39 mg/g on losartan).      - renal ultrasound performed and essentially normal  - continue RAAS blockade for renoprotection/albuminuria and hypertension with losartan at 50 mg BID (lisinopril was discontinued due to possible dry cough)  - return to clinic in 6 months     Hypertension: Primary. Appears more near goal of at least <130/80 especially based on 24 ambulatory blood pressure monitoring.  She also had an excellent dipping pattern.  Regardless, she does have very high blood pressures at times that has led to an ER visit such that more aggressive control seems warranted.  She does have some intolerances to antihypertensive medications that has led to the discontinuation of thiazides (hyponatremia), amlodipine (edema), nifedipine (edema), lisinopril (cough) and now carvedilol (fatigue). We made some slight adjustments today.       -decrease metoprolol XL to 25 mg daily, increase spironolactone to 75  mg daily and continue losartan 100 mg daily  -will recheck kidney function, electrolytes and urine albumin in1-2 weeks    Hyponatermia: Sodium in the low 130's and even lower at times but stable.  Asymptomatic.    -will evaluate with urine studies at her f/u visit in 2 months.  If she becomes frankly hyponatremic then we will see her  sooner.  Suspect she has resest osmostat.        Follow-up: C in 6 months     HPI:   Josy John is a 58 year old woman with a history of hypertension (controlled on lisinopril and amlodipine) who was referred for nephrology consult by her PCP due to proteinuria. The patient reports when she was 5 y.o. She had recurrent UTI/pyelonephritis for approximately a year and finally had ureteral implantation to prevent suspected ureteral reflux. Since then, she has not had any significant history of UTI. She states she was diagnosed with hypertension many years ago. She was first started on hydrochlorothiazide but then switched to amlodipine and lisinopril. She previously was on 20 mg lisinopril once daily, which caused her to have significant muscle cramping, so this was decreased to 10 mg once daily but later returned to 20 mg daily.     She was seen by her PCP and noted to have proteinuria and referred to nephrology.  The patient does express concern for why these proteins are showing up in her urine. She notes she had occipital headaches when she wakes up in the morning last year and wonders if this is related to hormone levels or caffeine intake. More recenly she reports having migraines that she thinks may be related to undergoing menopause.  She denies any significant pain medication use, though she does use tylenol during arthritis flares. She also denies any history of kidney stones or diabetes.  She has no family history of kidney disease.  Her lisinopril remains at 20 mg daily and she continues on amlodipine at 2.5 mg dialy.  Her home BP varies and is sometimes in closer to 138/80's more recently.  She continues on a low salt diet and exercises regularly.            Interval History:  Mrs. John is currently on losartan 50 mg BID and tolerating it well.  BP remains somewhat labile.  .  She reports amlodpine din't help though continues at 2.5 mg at bedtime.  She is tolerating metoprolol XL which was started in  place of carvedilol (at a previous visit) which may have been causing fatigue. She continues to have large fluid intake.  Overall, she feels well.  We adjusted her antihypertensive regimen today. Otherwise, she feels well and no major changes were made.       Review of Systems:   Pertinent items are noted in HPI or as below, remainder of complete ROS is negative.      Active Medications:   Current Outpatient Medications   Medication Sig Dispense Refill     Cholecalciferol (VITAMIN D3 PO) Take 1,000 Units by mouth daily       citalopram (CELEXA) 20 MG tablet TAKE ONE TABLET BY MOUTH ONCE DAILY 90 tablet 3     losartan (COZAAR) 50 MG tablet Take 1 tablet (50 mg) by mouth 2 times daily 60 tablet 11     magnesium 250 MG tablet Take 1 tablet by mouth daily       metoprolol succinate ER (TOPROL XL) 100 MG 24 hr tablet Take 1 tablet (100 mg) by mouth daily 30 tablet 3     spironolactone (ALDACTONE) 25 MG tablet Take 2 tablets (50 mg) by mouth daily 90 tablet 3        Allergies:   Zoloft      Past Medical History:  Generalized anxiety disorder   Hypertension, controlled  Microalbuminuria  Vitamin D deficiency     Past Surgical History:  Reimplantation of ureters    Family History:   No family history of kidney disease. Family hypertension (mother, brother, and father).      Social History:   The patient has never smoked. Denies alcohol use.     Physical Exam:  /76   Pulse 60   Wt 84.4 kg (186 lb)   BMI 27.74 kg/m     Deferred as encounter was a video visit.     Laboratory:  CMP  Recent Labs   Lab Test 03/27/23  0935 12/19/22  1024 05/09/22  1136 04/25/22  1823 07/19/21  1713 07/19/21  1713 06/08/21  1652 10/05/20  0822 07/02/20  1545 02/29/20  0910   NA  --  131* 130* 129*  --  132* 128* 131* 131* 130*   POTASSIUM  --  4.6 4.2 4.6  --  3.7 4.3 4.2 3.9 4.5   CHLORIDE  --  96* 97 97  --  99 97 99 99 98   CO2  --  26 25 25  --  25 24 26 29 26   ANIONGAP  --  9 8 7  --  8 7 6 3 6   GLC  --  83 102* 98  --  94 82 80 86  98   BUN  --  11.2 10 15  --  11 12 14 14 14   CR  --  0.71 0.62 0.74  --  0.67 0.59 0.76 0.68 0.58   GFRESTIMATED  --  >90 >90 >90  --  >90 >90 88 >90 >90   GFRESTBLACK  --   --   --   --   --   --  >90 >90 >90 >90   KRIS  --  9.1 9.0 9.4  --  9.1 9.1 9.3 9.1 9.1   PHOS  --  3.6 3.6 4.1  --   --   --  3.7  --   --    PROTTOTAL 8.2  --   --   --   --  8.2 8.5  --   --  8.7   ALBUMIN 4.6 4.4 4.1 4.3   < > 4.0 4.1 4.1  --  4.1   BILITOTAL 0.5  --   --   --   --  0.3 0.3  --   --  0.4   ALKPHOS 80  --   --   --   --  93 96  --   --  87   AST 31  --   --   --   --  23 23  --   --  25   ALT 18  --   --   --   --  24 28  --   --  32    < > = values in this interval not displayed.     CBC  Recent Labs   Lab Test 12/19/22  1024 04/25/22  1823 07/19/21  1714 06/08/21  1652 10/05/20  0822 02/29/20  0910   HGB 12.1 13.1 12.3 12.4 13.3 13.7   WBC  --   --  6.7 8.0 5.5 5.8   RBC  --   --  3.99 3.96 4.26 4.43   HCT  --   --  36.0 36.5 39.3 40.3   MCV  --   --  90 92 92 91   MCH  --   --  30.8 31.3 31.2 30.9   MCHC  --   --  34.2 34.0 33.8 34.0   RDW  --   --  11.8 11.6 12.1 12.1   PLT  --   --  341 336 316 328       URINE STUDIES  Recent Labs   Lab Test 10/05/20  0823 12/27/18  1433 12/22/17  1521   COLOR Yellow Straw Yellow   APPEARANCE Clear Clear Clear   URINEGLC Negative Negative Negative   URINEBILI Negative Negative Negative   URINEKETONE Negative Negative 15*   SG 1.020 1.008 1.015   UBLD Trace* Negative Small*   URINEPH 7.5* 7.0 6.5   PROTEIN 30* Negative Negative   UROBILINOGEN 0.2  --  0.2   NITRITE Negative Negative Negative   LEUKEST Negative Negative Negative   RBCU O - 2 1 O - 2   WBCU 0 - 5 <1 O - 2     Recent Labs   Lab Test 04/25/22  1829 06/08/21  1652 10/05/20  0823 07/02/20  1546 12/27/18  1433   UTPG 1.43* 0.62* 0.29* 0.58* 0.38*     PTH  Recent Labs   Lab Test 12/19/22  1024   PTHI 60     IRON STUDIES   Recent Labs   Lab Test 06/17/15  1550   IRON 80      IRONSAT 20   YUN 12      All above labs were  reviewed by me.   Sam Freire MD  [phone]

## 2023-04-24 NOTE — LETTER
2023       RE: Josy John  [address] [place] [address]     Dear Colleague,    Thank you for referring your patient, Josy John, to the [facility] NEPHROLOGY CLINIC [place] at [facility]. Please see a copy of my visit note below.    Virtual Visit Details    Type of service:  Video Visit     Originating Location (pt. Location): Home  Distant Location (provider location):  Off-site  Platform used for Video Visit: Yamil Freire MD        Nephrology Clinic - Video Visit    Sam Freire MD  2023     Name: Josy John  MRN: [id number]  Age: 58 year old  : 1965  Referring provider: Samantha Mcdonald     Assessment and Plan:     CKD, stage 1/albuminuria: She has preserved kidney function with minimal albuminuria.   She has no microscopic hematuria or evidence of active urinary sediment.  I suspect she sustained some renal injury at an early age as a result of recurrent UTI/pyelonephritis form urinary reflux (that has since been corrected with ureteral reimplantation).  As a result, she likely has some mild scarring resulting in minimal albuminuria (452 mg/g in 2020 and now down to ~39 mg/g on losartan).      - renal ultrasound performed and essentially normal  - continue RAAS blockade for renoprotection/albuminuria and hypertension with losartan at 50 mg BID (lisinopril was discontinued due to possible dry cough)  - return to clinic in 6 months     Hypertension: Primary. Appears more near goal of at least <130/80 especially based on 24 ambulatory blood pressure monitoring.  She also had an excellent dipping pattern.  Regardless, she does have very high blood pressures at times that has led to an ER visit such that more aggressive control seems warranted.  She does have some intolerances to antihypertensive medications that has led to the discontinuation of thiazides (hyponatremia),  amlodipine (edema), nifedipine (edema), lisinopril (cough) and now carvedilol (fatigue). We made some slight adjustments today.       -decrease metoprolol XL to 25 mg daily, increase spironolactone to 75  mg daily and continue losartan 100 mg daily  -will recheck kidney function, electrolytes and urine albumin in1-2 weeks    Hyponatermia: Sodium in the low 130's and even lower at times but stable.  Asymptomatic.    -will evaluate with urine studies at her f/u visit in 2 months.  If she becomes frankly hyponatremic then we will see her sooner.  Suspect she has resest osmostat.        Follow-up: RTC in 6 months     HPI:   Josy John is a 58 year old woman with a history of hypertension (controlled on lisinopril and amlodipine) who was referred for nephrology consult by her PCP due to proteinuria. The patient reports when she was 5 y.o. She had recurrent UTI/pyelonephritis for approximately a year and finally had ureteral implantation to prevent suspected ureteral reflux. Since then, she has not had any significant history of UTI. She states she was diagnosed with hypertension many years ago. She was first started on hydrochlorothiazide but then switched to amlodipine and lisinopril. She previously was on 20 mg lisinopril once daily, which caused her to have significant muscle cramping, so this was decreased to 10 mg once daily but later returned to 20 mg daily.     She was seen by her PCP and noted to have proteinuria and referred to nephrology.  The patient does express concern for why these proteins are showing up in her urine. She notes she had occipital headaches when she wakes up in the morning last year and wonders if this is related to hormone levels or caffeine intake. More recenly she reports having migraines that she thinks may be related to undergoing menopause.  She denies any significant pain medication use, though she does use tylenol during arthritis flares. She also denies any history of kidney  stones or diabetes.  She has no family history of kidney disease.  Her lisinopril remains at 20 mg daily and she continues on amlodipine at 2.5 mg dialy.  Her home BP varies and is sometimes in closer to 138/80's more recently.  She continues on a low salt diet and exercises regularly.            Interval History:  Mrs. John is currently on losartan 50 mg BID and tolerating it well.  BP remains somewhat labile.  .  She reports amlodpine din't help though continues at 2.5 mg at bedtime.  She is tolerating metoprolol XL which was started in place of carvedilol (at a previous visit) which may have been causing fatigue. She continues to have large fluid intake.  Overall, she feels well.  We adjusted her antihypertensive regimen today. Otherwise, she feels well and no major changes were made.       Review of Systems:   Pertinent items are noted in HPI or as below, remainder of complete ROS is negative.      Active Medications:   Current Outpatient Medications   Medication Sig Dispense Refill    Cholecalciferol (VITAMIN D3 PO) Take 1,000 Units by mouth daily      citalopram (CELEXA) 20 MG tablet TAKE ONE TABLET BY MOUTH ONCE DAILY 90 tablet 3    losartan (COZAAR) 50 MG tablet Take 1 tablet (50 mg) by mouth 2 times daily 60 tablet 11    magnesium 250 MG tablet Take 1 tablet by mouth daily      metoprolol succinate ER (TOPROL XL) 100 MG 24 hr tablet Take 1 tablet (100 mg) by mouth daily 30 tablet 3    spironolactone (ALDACTONE) 25 MG tablet Take 2 tablets (50 mg) by mouth daily 90 tablet 3        Allergies:   Zoloft      Past Medical History:  Generalized anxiety disorder   Hypertension, controlled  Microalbuminuria  Vitamin D deficiency     Past Surgical History:  Reimplantation of ureters    Family History:   No family history of kidney disease. Family hypertension (mother, brother, and father).      Social History:   The patient has never smoked. Denies alcohol use.     Physical Exam:  /76   Pulse 60   Wt 84.4 kg  (186 lb)   BMI 27.74 kg/m     Deferred as encounter was a video visit.     Laboratory:  CMP  Recent Labs   Lab Test 03/27/23  0935 12/19/22  1024 05/09/22  1136 04/25/22 1823 07/19/21  1713 07/19/21  1713 06/08/21  1652 10/05/20  0822 07/02/20  1545 02/29/20  0910   NA  --  131* 130* 129*  --  132* 128* 131* 131* 130*   POTASSIUM  --  4.6 4.2 4.6  --  3.7 4.3 4.2 3.9 4.5   CHLORIDE  --  96* 97 97  --  99 97 99 99 98   CO2  --  26 25 25  --  25 24 26 29 26   ANIONGAP  --  9 8 7  --  8 7 6 3 6   GLC  --  83 102* 98  --  94 82 80 86 98   BUN  --  11.2 10 15  --  11 12 14 14 14   CR  --  0.71 0.62 0.74  --  0.67 0.59 0.76 0.68 0.58   GFRESTIMATED  --  >90 >90 >90  --  >90 >90 88 >90 >90   GFRESTBLACK  --   --   --   --   --   --  >90 >90 >90 >90   KRIS  --  9.1 9.0 9.4  --  9.1 9.1 9.3 9.1 9.1   PHOS  --  3.6 3.6 4.1  --   --   --  3.7  --   --    PROTTOTAL 8.2  --   --   --   --  8.2 8.5  --   --  8.7   ALBUMIN 4.6 4.4 4.1 4.3   < > 4.0 4.1 4.1  --  4.1   BILITOTAL 0.5  --   --   --   --  0.3 0.3  --   --  0.4   ALKPHOS 80  --   --   --   --  93 96  --   --  87   AST 31  --   --   --   --  23 23  --   --  25   ALT 18  --   --   --   --  24 28  --   --  32    < > = values in this interval not displayed.     CBC  Recent Labs   Lab Test 12/19/22  1024 04/25/22  1823 07/19/21  1714 06/08/21  1652 10/05/20  0822 02/29/20  0910   HGB 12.1 13.1 12.3 12.4 13.3 13.7   WBC  --   --  6.7 8.0 5.5 5.8   RBC  --   --  3.99 3.96 4.26 4.43   HCT  --   --  36.0 36.5 39.3 40.3   MCV  --   --  90 92 92 91   MCH  --   --  30.8 31.3 31.2 30.9   MCHC  --   --  34.2 34.0 33.8 34.0   RDW  --   --  11.8 11.6 12.1 12.1   PLT  --   --  341 336 316 328       URINE STUDIES  Recent Labs   Lab Test 10/05/20  0823 12/27/18  1433 12/22/17  1521   COLOR Yellow Straw Yellow   APPEARANCE Clear Clear Clear   URINEGLC Negative Negative Negative   URINEBILI Negative Negative Negative   URINEKETONE Negative Negative 15*   SG 1.020 1.008 1.015   UBLD Trace*  Negative Small*   URINEPH 7.5* 7.0 6.5   PROTEIN 30* Negative Negative   UROBILINOGEN 0.2  --  0.2   NITRITE Negative Negative Negative   LEUKEST Negative Negative Negative   RBCU O - 2 1 O - 2   WBCU 0 - 5 <1 O - 2     Recent Labs   Lab Test 04/25/22  1829 06/08/21  1652 10/05/20  0823 07/02/20  1546 12/27/18  1433   UTPG 1.43* 0.62* 0.29* 0.58* 0.38*     PTH  Recent Labs   Lab Test 12/19/22  1024   PTHI 60     IRON STUDIES   Recent Labs   Lab Test 06/17/15  1550   IRON 80      IRONSAT 20   YUN 12      All above labs were reviewed by me.   Sam Freire MD  [phone]

## 2023-04-27 DIAGNOSIS — I10 HYPERTENSION, ESSENTIAL: ICD-10-CM

## 2023-04-28 RX ORDER — METOPROLOL SUCCINATE 100 MG/1
100 TABLET, EXTENDED RELEASE ORAL DAILY
Qty: 30 TABLET | Refills: 3 | Status: SHIPPED | OUTPATIENT
Start: 2023-04-28 | End: 2024-01-09

## 2023-05-17 ENCOUNTER — LAB (OUTPATIENT)
Dept: LAB | Facility: CLINIC | Age: 58
End: 2023-05-17
Payer: COMMERCIAL

## 2023-05-17 DIAGNOSIS — N18.1 CKD (CHRONIC KIDNEY DISEASE) STAGE 1, GFR 90 ML/MIN OR GREATER: ICD-10-CM

## 2023-05-17 LAB — HGB BLD-MCNC: 12 G/DL (ref 11.7–15.7)

## 2023-05-17 PROCEDURE — 36415 COLL VENOUS BLD VENIPUNCTURE: CPT

## 2023-05-17 PROCEDURE — 80069 RENAL FUNCTION PANEL: CPT

## 2023-05-17 PROCEDURE — 82570 ASSAY OF URINE CREATININE: CPT

## 2023-05-17 PROCEDURE — 82043 UR ALBUMIN QUANTITATIVE: CPT

## 2023-05-17 PROCEDURE — 85018 HEMOGLOBIN: CPT

## 2023-05-18 LAB
ALBUMIN SERPL BCG-MCNC: 4.6 G/DL (ref 3.5–5.2)
ANION GAP SERPL CALCULATED.3IONS-SCNC: 11 MMOL/L (ref 7–15)
BUN SERPL-MCNC: 17.8 MG/DL (ref 6–20)
CALCIUM SERPL-MCNC: 9 MG/DL (ref 8.6–10)
CHLORIDE SERPL-SCNC: 92 MMOL/L (ref 98–107)
CREAT SERPL-MCNC: 0.78 MG/DL (ref 0.51–0.95)
CREAT UR-MCNC: 39.4 MG/DL
DEPRECATED HCO3 PLAS-SCNC: 24 MMOL/L (ref 22–29)
GFR SERPL CREATININE-BSD FRML MDRD: 88 ML/MIN/1.73M2
GLUCOSE SERPL-MCNC: 92 MG/DL (ref 70–99)
MICROALBUMIN UR-MCNC: 15.2 MG/L
MICROALBUMIN/CREAT UR: 38.58 MG/G CR (ref 0–25)
PHOSPHATE SERPL-MCNC: 4.3 MG/DL (ref 2.5–4.5)
POTASSIUM SERPL-SCNC: 5.1 MMOL/L (ref 3.4–5.3)
SODIUM SERPL-SCNC: 127 MMOL/L (ref 136–145)

## 2023-06-09 DIAGNOSIS — R80.9 ALBUMINURIA: ICD-10-CM

## 2023-06-09 DIAGNOSIS — I10 HYPERTENSION, ESSENTIAL: ICD-10-CM

## 2023-06-12 RX ORDER — LOSARTAN POTASSIUM 50 MG/1
TABLET ORAL
Qty: 60 TABLET | Refills: 11 | Status: SHIPPED | OUTPATIENT
Start: 2023-06-12 | End: 2024-01-09

## 2023-08-31 DIAGNOSIS — R80.9 ALBUMINURIA: ICD-10-CM

## 2023-08-31 DIAGNOSIS — I10 HYPERTENSION, ESSENTIAL: ICD-10-CM

## 2023-09-06 RX ORDER — SPIRONOLACTONE 25 MG/1
50 TABLET ORAL DAILY
Qty: 90 TABLET | Refills: 3 | Status: SHIPPED | OUTPATIENT
Start: 2023-09-06 | End: 2024-04-19

## 2023-10-01 ENCOUNTER — TRANSFERRED RECORDS (OUTPATIENT)
Dept: MULTI SPECIALTY CLINIC | Facility: CLINIC | Age: 58
End: 2023-10-01

## 2023-10-01 LAB — PAP SMEAR - HIM PATIENT REPORTED: NEGATIVE

## 2023-10-06 DIAGNOSIS — R80.9 ALBUMINURIA: Primary | ICD-10-CM

## 2023-10-06 DIAGNOSIS — N18.1 CKD (CHRONIC KIDNEY DISEASE) STAGE 1, GFR 90 ML/MIN OR GREATER: ICD-10-CM

## 2023-10-10 ENCOUNTER — LAB (OUTPATIENT)
Dept: LAB | Facility: CLINIC | Age: 58
End: 2023-10-10
Payer: COMMERCIAL

## 2023-10-10 DIAGNOSIS — N18.1 CKD (CHRONIC KIDNEY DISEASE) STAGE 1, GFR 90 ML/MIN OR GREATER: ICD-10-CM

## 2023-10-10 DIAGNOSIS — R80.9 ALBUMINURIA: ICD-10-CM

## 2023-10-10 PROCEDURE — 82570 ASSAY OF URINE CREATININE: CPT

## 2023-10-10 PROCEDURE — 36415 COLL VENOUS BLD VENIPUNCTURE: CPT

## 2023-10-10 PROCEDURE — 80069 RENAL FUNCTION PANEL: CPT

## 2023-10-10 PROCEDURE — 82043 UR ALBUMIN QUANTITATIVE: CPT

## 2023-10-11 ENCOUNTER — VIRTUAL VISIT (OUTPATIENT)
Dept: NEPHROLOGY | Facility: CLINIC | Age: 58
End: 2023-10-11
Payer: COMMERCIAL

## 2023-10-11 DIAGNOSIS — E87.1 HYPONATREMIA: ICD-10-CM

## 2023-10-11 DIAGNOSIS — R80.9 ALBUMINURIA: Primary | ICD-10-CM

## 2023-10-11 LAB
ALBUMIN SERPL BCG-MCNC: 4.6 G/DL (ref 3.5–5.2)
ANION GAP SERPL CALCULATED.3IONS-SCNC: 12 MMOL/L (ref 7–15)
BUN SERPL-MCNC: 18.9 MG/DL (ref 6–20)
CALCIUM SERPL-MCNC: 9.1 MG/DL (ref 8.6–10)
CHLORIDE SERPL-SCNC: 94 MMOL/L (ref 98–107)
CREAT SERPL-MCNC: 0.86 MG/DL (ref 0.51–0.95)
CREAT UR-MCNC: 35.2 MG/DL
DEPRECATED HCO3 PLAS-SCNC: 24 MMOL/L (ref 22–29)
EGFRCR SERPLBLD CKD-EPI 2021: 78 ML/MIN/1.73M2
GLUCOSE SERPL-MCNC: 95 MG/DL (ref 70–99)
MICROALBUMIN UR-MCNC: 27.3 MG/L
MICROALBUMIN/CREAT UR: 77.56 MG/G CR (ref 0–25)
PHOSPHATE SERPL-MCNC: 3.8 MG/DL (ref 2.5–4.5)
POTASSIUM SERPL-SCNC: 4.7 MMOL/L (ref 3.4–5.3)
SODIUM SERPL-SCNC: 130 MMOL/L (ref 135–145)

## 2023-10-11 PROCEDURE — 99214 OFFICE O/P EST MOD 30 MIN: CPT | Mod: VID

## 2023-10-11 NOTE — LETTER
10/11/2023       RE: Josy John  [address] [place] [address]     Dear Colleague,    Thank you for referring your patient, Josy John, to the [facility] NEPHROLOGY CLINIC [place] at [facility]. Please see a copy of my visit note below.    Virtual Visit Details    Type of service:  Video Visit     Originating Location (pt. Location): Home    Distant Location (provider location):  On-site  Platform used for Video Visit: [facility]    Nephrology Video Visit 10/11/23    Assessment and Plan:    HTN - Level of control unclear. Intermit home b/ps generally < 130/80 w/trace edema. Josy was at work and didn't have her pill bottles with her. She is feeling better following the changes she made in her regimen    - She believes she is currently taking Losartan 50 mg every day, Metoprolol XL 50 mg every day and Spironolactone 50 mg every day    - Her UACR christophe from last visit, likely from decrease in her Losartan dose    - Will have nursing verify what she is taking and what her b/ps are running. Ideally would want to max out Losartan, increase Spironolactone and discontinue Toprol.     - Creat today is stable at 0.8    2. Chronic hyponatremia - Na 130 and stable    3. Disposition - RTC 6 months for follow up w/labs prior    Assessment and plan was discussed with patient and she voiced her understanding and agreement.    Reason for Visit:  HTN/albuminuria    HPI:  Ms John is a 59 yo female with HTN, Albuminuria, chronic hyponatremia present today for routine HTN/albuminuria follow up. Last seen in clinic by Dr Freire 4/24/23. At that time her Toprol was decreased to 25 mg to make room to increase Spironolactone to 75 mg every day ( for albuminuria) and to continue Losartan 100 mg every day. In the interim patient's Metoprolol was decreased to 50 mg every day ( had not decreased to 25 mg as recommended last visit) and her HR was 48. She was not feeling well  on the regimen she was following so has taken steps to improve her diet with heart friendly items such as beets, has continued to exercise regularly but has not been able to lose weight. Currently she is taking Toprol XL 50 mg every day, Losartan 50 mg every day and Spironolactone 50 mg every day   Home b/ps < 130/80. She notes trace edema. HR now in the 60's. No CP/dyspnea  UACR 77.5 mg/gCr today from 38.58 mg/gCr in 5/23    ROS:   A comprehensive review of systems was obtained and negative, except as noted in the HPI or PMH.    Chronic Health Problems:    HTN  Albuminuria  Seasonal allergies  ALICIA  Vit D def  GERD  Migraine  Chronic hyponatremia    Family Hx:   Family History   Problem Relation Age of Onset     Hypertension Mother      Thyroid Disease Mother         goiter     Lipids Mother         70s     Hypertension Father      Thyroid Disease Father         hypothyroidism     Hypertension Brother      Connective Tissue Disorder Child         spondyloarthropathy     Personal Hx:   Social History     Tobacco Use     Smoking status: Never     Smokeless tobacco: Never   Substance Use Topics     Alcohol use: Yes     Comment: rare       Allergies:  Allergies   Allergen Reactions     Zoloft      extreme fatigue and nausea       Medications:  Current Outpatient Medications   Medication Sig     Cholecalciferol (VITAMIN D3 PO) Take 1,000 Units by mouth daily     citalopram (CELEXA) 20 MG tablet TAKE ONE TABLET BY MOUTH ONCE DAILY     losartan (COZAAR) 50 MG tablet TAKE ONE TABLET BY MOUTH TWICE A DAY     magnesium 250 MG tablet Take 1 tablet by mouth daily     metoprolol succinate ER (TOPROL XL) 100 MG 24 hr tablet TAKE 1 TABLET (100 MG) BY MOUTH DAILY     spironolactone (ALDACTONE) 25 MG tablet TAKE 2 TABLETS (50 MG) BY MOUTH DAILY     No current facility-administered medications for this visit.      Vitals:  There were no vitals taken for this visit.    Exam:  GEN: Pleasant female in NAD  RESP: Breathing is non  labored  NEURO: A/O    LABS:   CMP  Recent Labs   Lab Test 10/10/23  1629 05/17/23  1414 12/19/22  1024 05/09/22  1136 07/19/21  1713 06/08/21  1652 10/05/20  0822 07/02/20  1545 02/29/20  0910   * 127* 131* 130*   < > 128* 131* 131* 130*   POTASSIUM 4.7 5.1 4.6 4.2   < > 4.3 4.2 3.9 4.5   CHLORIDE 94* 92* 96* 97   < > 97 99 99 98   CO2 24 24 26 25   < > 24 26 29 26   ANIONGAP 12 11 9 8   < > 7 6 3 6   GLC 95 92 83 102*   < > 82 80 86 98   BUN 18.9 17.8 11.2 10   < > 12 14 14 14   CR 0.86 0.78 0.71 0.62   < > 0.59 0.76 0.68 0.58   GFRESTIMATED 78 88 >90 >90   < > >90 88 >90 >90   GFRESTBLACK  --   --   --   --   --  >90 >90 >90 >90   KRIS 9.1 9.0 9.1 9.0   < > 9.1 9.3 9.1 9.1    < > = values in this interval not displayed.     Recent Labs   Lab Test 03/27/23  0935 07/19/21 1713 06/08/21  1652 02/29/20  0910   BILITOTAL 0.5 0.3 0.3 0.4   ALKPHOS 80 93 96 87   ALT 18 24 28 32   AST 31 23 23 25     CBC  Recent Labs   Lab Test 05/17/23  1414 12/19/22  1024 04/25/22  1823 07/19/21  1714 06/08/21  1652 10/05/20  0822 02/29/20  0910   HGB 12.0 12.1 13.1 12.3 12.4 13.3 13.7   WBC  --   --   --  6.7 8.0 5.5 5.8   RBC  --   --   --  3.99 3.96 4.26 4.43   HCT  --   --   --  36.0 36.5 39.3 40.3   MCV  --   --   --  90 92 92 91   MCH  --   --   --  30.8 31.3 31.2 30.9   MCHC  --   --   --  34.2 34.0 33.8 34.0   RDW  --   --   --  11.8 11.6 12.1 12.1   PLT  --   --   --  341 336 316 328     URINE STUDIES  Recent Labs   Lab Test 10/05/20  0823 12/27/18  1433 12/22/17  1521   COLOR Yellow Straw Yellow   APPEARANCE Clear Clear Clear   URINEGLC Negative Negative Negative   URINEBILI Negative Negative Negative   URINEKETONE Negative Negative 15*   SG 1.020 1.008 1.015   UBLD Trace* Negative Small*   URINEPH 7.5* 7.0 6.5   PROTEIN 30* Negative Negative   UROBILINOGEN 0.2  --  0.2   NITRITE Negative Negative Negative   LEUKEST Negative Negative Negative   RBCU O - 2 1 O - 2   WBCU 0 - 5 <1 O - 2     Recent Labs   Lab Test  04/25/22  1829 06/08/21  1652 10/05/20  0823 07/02/20  1546   UTPG 1.43* 0.62* 0.29* 0.58*     PTH  Recent Labs   Lab Test 12/19/22  1024   PTHI 60     IRON STUDIES  No lab results found.    Mariah Renee NP        Again, thank you for allowing me to participate in the care of your patient.      Sincerely,    Mariah Renee NP

## 2023-10-11 NOTE — PROGRESS NOTES
Virtual Visit Details    Type of service:  Video Visit     Originating Location (pt. Location): Home    Distant Location (provider location):  On-site  Platform used for Video Visit: Yamil

## 2023-10-11 NOTE — NURSING NOTE
Is the patient currently in the state of [place]? YES    Visit mode:VIDEO    If the visit is dropped, the patient can be reconnected by: VIDEO VISIT: Text to cell phone:   Telephone Information:   Mobile [phone]       Will anyone else be joining the visit? NO  (If patient encounters technical issues they should call [phone]    How would you like to obtain your AVS? MyChart    Are changes needed to the allergy or medication list? No    Reason for visit: RECHECK    Kenton GRIMALDO

## 2023-10-12 NOTE — PROGRESS NOTES
Nephrology Video Visit 10/11/23    Assessment and Plan:    HTN - Level of control unclear. Intermit home b/ps generally < 130/80 w/trace edema. Josy was at work and didn't have her pill bottles with her. She is feeling better following the changes she made in her regimen    - She believes she is currently taking Losartan 50 mg every day, Metoprolol XL 50 mg every day and Spironolactone 50 mg every day    - Her UACR christophe from last visit, likely from decrease in her Losartan dose    - Will have nursing verify what she is taking and what her b/ps are running. Ideally would want to max out Losartan, increase Spironolactone and discontinue Toprol.     - Creat today is stable at 0.8    2. Chronic hyponatremia - Na 130 and stable    3. Disposition - RTC 6 months for follow up w/labs prior    Assessment and plan was discussed with patient and she voiced her understanding and agreement.    Reason for Visit:  HTN/albuminuria    HPI:  Ms John is a 59 yo female with HTN, Albuminuria, chronic hyponatremia present today for routine HTN/albuminuria follow up. Last seen in clinic by Dr Freire 4/24/23. At that time her Toprol was decreased to 25 mg to make room to increase Spironolactone to 75 mg every day ( for albuminuria) and to continue Losartan 100 mg every day. In the interim patient's Metoprolol was decreased to 50 mg every day ( had not decreased to 25 mg as recommended last visit) and her HR was 48. She was not feeling well on the regimen she was following so has taken steps to improve her diet with heart friendly items such as beets, has continued to exercise regularly but has not been able to lose weight. Currently she is taking Toprol XL 50 mg every day, Losartan 50 mg every day and Spironolactone 50 mg every day   Home b/ps < 130/80. She notes trace edema. HR now in the 60's. No CP/dyspnea  UACR 77.5 mg/gCr today from 38.58 mg/gCr in 5/23    ROS:   A comprehensive review of systems was obtained and negative, except  as noted in the HPI or PMH.    Chronic Health Problems:    HTN  Albuminuria  Seasonal allergies  ALICIA  Vit D def  GERD  Migraine  Chronic hyponatremia    Family Hx:   Family History   Problem Relation Age of Onset    Hypertension Mother     Thyroid Disease Mother         goiter    Lipids Mother         70s    Hypertension Father     Thyroid Disease Father         hypothyroidism    Hypertension Brother     Connective Tissue Disorder Child         spondyloarthropathy     Personal Hx:   Social History     Tobacco Use    Smoking status: Never    Smokeless tobacco: Never   Substance Use Topics    Alcohol use: Yes     Comment: rare       Allergies:  Allergies   Allergen Reactions    Zoloft      extreme fatigue and nausea       Medications:  Current Outpatient Medications   Medication Sig    Cholecalciferol (VITAMIN D3 PO) Take 1,000 Units by mouth daily    citalopram (CELEXA) 20 MG tablet TAKE ONE TABLET BY MOUTH ONCE DAILY    losartan (COZAAR) 50 MG tablet TAKE ONE TABLET BY MOUTH TWICE A DAY    magnesium 250 MG tablet Take 1 tablet by mouth daily    metoprolol succinate ER (TOPROL XL) 100 MG 24 hr tablet TAKE 1 TABLET (100 MG) BY MOUTH DAILY    spironolactone (ALDACTONE) 25 MG tablet TAKE 2 TABLETS (50 MG) BY MOUTH DAILY     No current facility-administered medications for this visit.      Vitals:  There were no vitals taken for this visit.    Exam:  GEN: Pleasant female in NAD  RESP: Breathing is non labored  NEURO: A/O    LABS:   CMP  Recent Labs   Lab Test 10/10/23  1629 05/17/23  1414 12/19/22  1024 05/09/22  1136 07/19/21  1713 06/08/21  1652 10/05/20  0822 07/02/20  1545 02/29/20  0910   * 127* 131* 130*   < > 128* 131* 131* 130*   POTASSIUM 4.7 5.1 4.6 4.2   < > 4.3 4.2 3.9 4.5   CHLORIDE 94* 92* 96* 97   < > 97 99 99 98   CO2 24 24 26 25   < > 24 26 29 26   ANIONGAP 12 11 9 8   < > 7 6 3 6   GLC 95 92 83 102*   < > 82 80 86 98   BUN 18.9 17.8 11.2 10   < > 12 14 14 14   CR 0.86 0.78 0.71 0.62   < > 0.59  0.76 0.68 0.58   GFRESTIMATED 78 88 >90 >90   < > >90 88 >90 >90   GFRESTBLACK  --   --   --   --   --  >90 >90 >90 >90   KRIS 9.1 9.0 9.1 9.0   < > 9.1 9.3 9.1 9.1    < > = values in this interval not displayed.     Recent Labs   Lab Test 03/27/23  0935 07/19/21 1713 06/08/21 1652 02/29/20  0910   BILITOTAL 0.5 0.3 0.3 0.4   ALKPHOS 80 93 96 87   ALT 18 24 28 32   AST 31 23 23 25     CBC  Recent Labs   Lab Test 05/17/23  1414 12/19/22  1024 04/25/22  1823 07/19/21  1714 06/08/21  1652 10/05/20  0822 02/29/20  0910   HGB 12.0 12.1 13.1 12.3 12.4 13.3 13.7   WBC  --   --   --  6.7 8.0 5.5 5.8   RBC  --   --   --  3.99 3.96 4.26 4.43   HCT  --   --   --  36.0 36.5 39.3 40.3   MCV  --   --   --  90 92 92 91   MCH  --   --   --  30.8 31.3 31.2 30.9   MCHC  --   --   --  34.2 34.0 33.8 34.0   RDW  --   --   --  11.8 11.6 12.1 12.1   PLT  --   --   --  341 336 316 328     URINE STUDIES  Recent Labs   Lab Test 10/05/20  0823 12/27/18  1433 12/22/17  1521   COLOR Yellow Straw Yellow   APPEARANCE Clear Clear Clear   URINEGLC Negative Negative Negative   URINEBILI Negative Negative Negative   URINEKETONE Negative Negative 15*   SG 1.020 1.008 1.015   UBLD Trace* Negative Small*   URINEPH 7.5* 7.0 6.5   PROTEIN 30* Negative Negative   UROBILINOGEN 0.2  --  0.2   NITRITE Negative Negative Negative   LEUKEST Negative Negative Negative   RBCU O - 2 1 O - 2   WBCU 0 - 5 <1 O - 2     Recent Labs   Lab Test 04/25/22  1829 06/08/21  1652 10/05/20  0823 07/02/20  1546   UTPG 1.43* 0.62* 0.29* 0.58*     PTH  Recent Labs   Lab Test 12/19/22  1024   PTHI 60     IRON STUDIES  No lab results found.    Mariah Renee, NP

## 2023-10-19 ENCOUNTER — TELEPHONE (OUTPATIENT)
Dept: NEPHROLOGY | Facility: CLINIC | Age: 58
End: 2023-10-19
Payer: COMMERCIAL

## 2023-10-19 NOTE — TELEPHONE ENCOUNTER
LVM & sent mychart // pt needs to schedule 6 month Return Neph with Dominga Víctor around 4.11.24 with labs prior // first attempt, AN 10.19.23

## 2023-10-23 ENCOUNTER — TELEPHONE (OUTPATIENT)
Dept: NEPHROLOGY | Facility: CLINIC | Age: 58
End: 2023-10-23
Payer: COMMERCIAL

## 2023-10-23 NOTE — TELEPHONE ENCOUNTER
Spoke to Josy regarding blood pressures/ heart rate this last week. Her BP's have been <130/ 75-82. States her heart rates have been in the 50's.  She has been taking:  Metoprolol 50mg daily in a.m.  Losartan 50mg twice a day  Spironolactone 12.5mg twice a day

## 2024-01-09 ENCOUNTER — MYC REFILL (OUTPATIENT)
Dept: NEPHROLOGY | Facility: CLINIC | Age: 59
End: 2024-01-09
Payer: COMMERCIAL

## 2024-01-09 DIAGNOSIS — R80.9 ALBUMINURIA: ICD-10-CM

## 2024-01-09 DIAGNOSIS — I10 HYPERTENSION, ESSENTIAL: ICD-10-CM

## 2024-01-09 RX ORDER — METOPROLOL SUCCINATE 100 MG/1
100 TABLET, EXTENDED RELEASE ORAL DAILY
Qty: 30 TABLET | Refills: 3 | Status: SHIPPED | OUTPATIENT
Start: 2024-01-09 | End: 2024-04-19

## 2024-01-09 RX ORDER — LOSARTAN POTASSIUM 50 MG/1
50 TABLET ORAL 2 TIMES DAILY
Qty: 180 TABLET | Refills: 11 | Status: SHIPPED | OUTPATIENT
Start: 2024-01-09

## 2024-01-10 DIAGNOSIS — F41.1 GENERALIZED ANXIETY DISORDER: ICD-10-CM

## 2024-01-11 RX ORDER — CITALOPRAM HYDROBROMIDE 20 MG/1
20 TABLET ORAL DAILY
Qty: 90 TABLET | Refills: 3 | Status: SHIPPED | OUTPATIENT
Start: 2024-01-11

## 2024-01-11 NOTE — TELEPHONE ENCOUNTER
Allergy warning, routed to PCP to review.    Hilda Martinez RN, BSN  [facility]

## 2024-04-16 ENCOUNTER — LAB (OUTPATIENT)
Dept: LAB | Facility: CLINIC | Age: 59
End: 2024-04-16
Payer: COMMERCIAL

## 2024-04-16 DIAGNOSIS — R80.9 ALBUMINURIA: ICD-10-CM

## 2024-04-16 DIAGNOSIS — E87.1 HYPONATREMIA: ICD-10-CM

## 2024-04-16 PROCEDURE — 36415 COLL VENOUS BLD VENIPUNCTURE: CPT

## 2024-04-16 PROCEDURE — 80069 RENAL FUNCTION PANEL: CPT

## 2024-04-17 LAB
ALBUMIN SERPL BCG-MCNC: 4.6 G/DL (ref 3.5–5.2)
ANION GAP SERPL CALCULATED.3IONS-SCNC: 14 MMOL/L (ref 7–15)
BUN SERPL-MCNC: 21.1 MG/DL (ref 8–23)
CALCIUM SERPL-MCNC: 9.4 MG/DL (ref 8.6–10)
CHLORIDE SERPL-SCNC: 94 MMOL/L (ref 98–107)
CREAT SERPL-MCNC: 0.86 MG/DL (ref 0.51–0.95)
DEPRECATED HCO3 PLAS-SCNC: 22 MMOL/L (ref 22–29)
EGFRCR SERPLBLD CKD-EPI 2021: 77 ML/MIN/1.73M2
GLUCOSE SERPL-MCNC: 83 MG/DL (ref 70–99)
PHOSPHATE SERPL-MCNC: 4.3 MG/DL (ref 2.5–4.5)
POTASSIUM SERPL-SCNC: 5.2 MMOL/L (ref 3.4–5.3)
SODIUM SERPL-SCNC: 130 MMOL/L (ref 135–145)

## 2024-04-19 ENCOUNTER — VIRTUAL VISIT (OUTPATIENT)
Dept: NEPHROLOGY | Facility: CLINIC | Age: 59
End: 2024-04-19
Payer: COMMERCIAL

## 2024-04-19 DIAGNOSIS — I10 HYPERTENSION, ESSENTIAL: ICD-10-CM

## 2024-04-19 DIAGNOSIS — R80.9 ALBUMINURIA: Primary | ICD-10-CM

## 2024-04-19 PROCEDURE — 99214 OFFICE O/P EST MOD 30 MIN: CPT | Mod: 95

## 2024-04-19 PROCEDURE — 82043 UR ALBUMIN QUANTITATIVE: CPT

## 2024-04-19 PROCEDURE — 82570 ASSAY OF URINE CREATININE: CPT

## 2024-04-19 PROCEDURE — 84300 ASSAY OF URINE SODIUM: CPT

## 2024-04-19 PROCEDURE — 83935 ASSAY OF URINE OSMOLALITY: CPT

## 2024-04-19 RX ORDER — METOPROLOL SUCCINATE 50 MG/1
50 TABLET, EXTENDED RELEASE ORAL DAILY
Qty: 90 TABLET | Refills: 3 | Status: SHIPPED | OUTPATIENT
Start: 2024-04-19 | End: 2024-04-19

## 2024-04-19 NOTE — LETTER
"4/19/2024       RE: Josy John  [address] [place] [address]     Dear Colleague,    Thank you for referring your patient, Josy John, to the [facility] [place] at [facility]. Please see a copy of my visit note below.    Virtual Visit Details    Type of service:  Video Visit     Originating Location (pt. Location): Home    Distant Location (provider location):  On-site  Platform used for Video Visit: [facility]    Nephrology Video Visit 4/19/24    Assessment and Plan:    HTN - Controlled w/minimal end of day edema per patient report. Her HR is ~ 50 on current dose of Metoprolol. Josy is concerned this is limiting her ability to get her HR up during exercise and burn calories. She has used Amlodipine in the past with edema.    - Current regimen:      Toprol XL 50 mg every day     Losartan 50 mg bid     Spironolactone 50 mg every day       Josy is willing to risk increased edema if we switch off Toprol and begin Nifedipine. She appears to be symptomatic of her bradycardia       Taper off Toprol over the next week and begin Nifedipine ER 30 mg every day       Continue daily b/ps and send my chart message in 2 wks with update    2. Renal function/albuminuria - Creat 0.8, UACR stable at 79 mg/gCr.    Etiology for her albuminuria per Dr Freire \"  I suspect she sustained some renal injury at an early age as a result of recurrent UTI/pyelonephritis form urinary reflux (that has since been corrected with ureteral reimplantation).  As a result, she likely has some mild scarring resulting in albuminuria that has remained well controlled when on RAAS blockade with lisinopril and BP under decent control\"   - Will continue Losartan and Spironolactone   - Blood pressure is controlled    3. Chronic hyponatremia - Na 130 and stable. Marcelo 40, Uosm 279 ( a little lower than previous). No edema except end of day. Suspect this is SIADH from her " selective serotonin reuptake inhibitor.     4. Electrolytes - K 5.2 on ARB/MRA   - Continue to monitor    5. Disposition - RTC 6 months for follow up w/labs prior    Assessment and plan was discussed with patient and she voiced her understanding and agreement.    Reason for Visit:  HTN/albuminuria    HPI:  Ms John is a 57 yo female with HTN, Albuminuria, chronic hyponatremia present today for routine HTN/albuminuria follow up.   Last seen in clinic by me 10/11/23.   Baseline creat 0.8    ROS:   Josy has ongoing concerns regarding inability to lose weight. She is wondering if Metoprolol is limiting her ability to lose weight given HR  Home b/ps 120-130/  She notes mild STEWART with walking long distances  No CP  She is menopausal. 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  Social History     Tobacco Use    Smoking status: Never    Smokeless tobacco: Never   Substance Use Topics    Alcohol use: Yes     Comment: rare       Allergies:  Allergies   Allergen Reactions    Zoloft      extreme fatigue and nausea       Medications:  Current Outpatient Medications   Medication Sig Dispense Refill    Cholecalciferol (VITAMIN D3 PO) Take 1,000 Units by mouth daily      citalopram (CELEXA) 20 MG tablet Take 1 tablet (20 mg) by mouth daily 90 tablet 3    losartan (COZAAR) 50 MG tablet Take 1 tablet (50 mg) by mouth 2 times daily 180 tablet 11    magnesium 250 MG tablet Take 1 tablet  by mouth daily      spironolactone (ALDACTONE) 25 MG tablet TAKE 2 TABLETS (50 MG) BY MOUTH DAILY 90 tablet 3     No current facility-administered medications for this visit.      Vitals:  There were no vitals taken for this visit.    Exam:  GEN: Pleasant female in NAD  RESP: Breathing is non labored  NEURO: A/O    LABS:   CMP  Recent Labs   Lab Test 04/16/24  1423 10/10/23  1629 05/17/23  1414 12/19/22  1024 07/19/21  1713 06/08/21  1652 10/05/20  0822 07/02/20  1545 02/29/20  0910   * 130* 127* 131*   < > 128* 131* 131* 130*   POTASSIUM 5.2 4.7 5.1 4.6   < > 4.3 4.2 3.9 4.5   CHLORIDE 94* 94* 92* 96*   < > 97 99 99 98   CO2 22 24 24 26   < > 24 26 29 26   ANIONGAP 14 12 11 9   < > 7 6 3 6   GLC 83 95 92 83   < > 82 80 86 98   BUN 21.1 18.9 17.8 11.2   < > 12 14 14 14   CR 0.86 0.86 0.78 0.71   < > 0.59 0.76 0.68 0.58   GFRESTIMATED 77 78 88 >90   < > >90 88 >90 >90   GFRESTBLACK  --   --   --   --   --  >90 >90 >90 >90   KRIS 9.4 9.1 9.0 9.1   < > 9.1 9.3 9.1 9.1    < > = values in this interval not displayed.     Recent Labs   Lab Test 03/27/23  0935 07/19/21  1713 06/08/21  1652 02/29/20  0910   BILITOTAL 0.5 0.3 0.3 0.4   ALKPHOS 80 93 96 87   ALT 18 24 28 32   AST 31 23 23 25     CBC  Recent Labs   Lab Test 05/17/23  1414 12/19/22  1024 04/25/22  1823 07/19/21  1714 06/08/21  1652 10/05/20  0822 02/29/20  0910   HGB 12.0 12.1 13.1 12.3 12.4 13.3 13.7   WBC  --   --   --  6.7 8.0 5.5 5.8   RBC  --   --   --  3.99 3.96 4.26 4.43   HCT  --   --   --  36.0 36.5 39.3 40.3   MCV  --   --   --  90 92 92 91   MCH  --   --   --  30.8 31.3 31.2 30.9   MCHC  --   --   --  34.2 34.0 33.8 34.0   RDW  --   --   --  11.8 11.6 12.1 12.1   PLT  --   --   --  341 336 316 328     URINE STUDIES  Recent Labs   Lab Test 10/05/20  0823 12/27/18  1433 12/22/17  1521   COLOR Yellow Straw Yellow   APPEARANCE Clear Clear Clear   URINEGLC Negative Negative Negative   URINEBILI Negative Negative Negative   URINEKETONE Negative  Negative 15*   SG 1.020 1.008 1.015   UBLD Trace* Negative Small*   URINEPH 7.5* 7.0 6.5   PROTEIN 30* Negative Negative   UROBILINOGEN 0.2  --  0.2   NITRITE Negative Negative Negative   LEUKEST Negative Negative Negative   RBCU O - 2 1 O - 2   WBCU 0 - 5 <1 O - 2     Recent Labs   Lab Test 04/25/22  1829 06/08/21  1652 10/05/20  0823 07/02/20  1546   UTPG 1.43* 0.62* 0.29* 0.58*     PTH  Recent Labs   Lab Test 12/19/22  1024   PTHI 60     IRON STUDIES  No lab results found.    Mariah Renee, NP

## 2024-04-19 NOTE — NURSING NOTE
Is the patient currently in the state of [place]? YES    Visit mode:VIDEO    If the visit is dropped, the patient can be reconnected by: VIDEO VISIT: Text to cell phone:   Telephone Information:   Mobile [phone]       Will anyone else be joining the visit? NO  (If patient encounters technical issues they should call [phone]    How would you like to obtain your AVS? MyChart    Are changes needed to the allergy or medication list? No    Are refills needed on medications prescribed by this physician? NO    Reason for visit: RECHECK    Kenton GRIMALDO

## 2024-04-19 NOTE — PATIENT INSTRUCTIONS
Taper off Toprol over the course of the week  Then begin Nifedipine ER 30 mg at bedtime  Check b/p daily  Send My Chart message in 2-3 wks with update on your b/ps

## 2024-04-20 LAB
CREAT UR-MCNC: 28.9 MG/DL
MICROALBUMIN UR-MCNC: 23.1 MG/L
MICROALBUMIN/CREAT UR: 79.93 MG/G CR (ref 0–25)
OSMOLALITY UR: 279 MMOL/KG (ref 100–1200)
SODIUM UR-SCNC: 40 MMOL/L

## 2024-04-21 RX ORDER — NIFEDIPINE 30 MG/1
30 TABLET, EXTENDED RELEASE ORAL DAILY
Qty: 90 TABLET | Refills: 2 | Status: SHIPPED | OUTPATIENT
Start: 2024-04-21

## 2024-04-21 RX ORDER — SPIRONOLACTONE 50 MG/1
50 TABLET, FILM COATED ORAL DAILY
Qty: 90 TABLET | Refills: 3 | Status: SHIPPED | OUTPATIENT
Start: 2024-04-21

## 2024-04-21 NOTE — PROGRESS NOTES
"Nephrology Video Visit 4/19/24    Assessment and Plan:    HTN - Controlled w/minimal end of day edema per patient report. Her HR is ~ 50 on current dose of Metoprolol. Josy is concerned this is limiting her ability to get her HR up during exercise and burn calories. She has used Amlodipine in the past with edema.    - Current regimen:      Toprol XL 50 mg every day     Losartan 50 mg bid     Spironolactone 50 mg every day       Josy is willing to risk increased edema if we switch off Toprol and begin Nifedipine. She appears to be symptomatic of her bradycardia       Taper off Toprol over the next week and begin Nifedipine ER 30 mg every day       Continue daily b/ps and send my chart message in 2 wks with update    2. Renal function/albuminuria - Creat 0.8, UACR stable at 79 mg/gCr.    Etiology for her albuminuria per Dr Freire \"  I suspect she sustained some renal injury at an early age as a result of recurrent UTI/pyelonephritis form urinary reflux (that has since been corrected with ureteral reimplantation).  As a result, she likely has some mild scarring resulting in albuminuria that has remained well controlled when on RAAS blockade with lisinopril and BP under decent control\"   - Will continue Losartan and Spironolactone   - Blood pressure is controlled    3. Chronic hyponatremia - Na 130 and stable. Marcelo 40, Uosm 279 ( a little lower than previous). No edema except end of day. Suspect this is SIADH from her selective serotonin reuptake inhibitor.     4. Electrolytes - K 5.2 on ARB/MRA   - Continue to monitor    5. Disposition - RTC 6 months for follow up w/labs prior    Assessment and plan was discussed with patient and she voiced her understanding and agreement.    Reason for Visit:  HTN/albuminuria    HPI:  Ms John is a 59 yo female with HTN, Albuminuria, chronic hyponatremia present today for routine HTN/albuminuria follow up.   Last seen in clinic by me 10/11/23.   Baseline creat 0.8    ROS:   Josy " has ongoing concerns regarding inability to lose weight. She is wondering if Metoprolol is limiting her ability to lose weight given HR  Home b/ps 120-130/  She notes mild STEWART with walking long distances  No CP  She is menopausal. 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  Social History     Tobacco Use    Smoking status: Never    Smokeless tobacco: Never   Substance Use Topics    Alcohol use: Yes     Comment: rare       Allergies:  Allergies   Allergen Reactions    Zoloft      extreme fatigue and nausea       Medications:  Current Outpatient Medications   Medication Sig Dispense Refill    Cholecalciferol (VITAMIN D3 PO) Take 1,000 Units by mouth daily      citalopram (CELEXA) 20 MG tablet Take 1 tablet (20 mg) by mouth daily 90 tablet 3    losartan (COZAAR) 50 MG tablet Take 1 tablet (50 mg) by mouth 2 times daily 180 tablet 11    magnesium 250 MG tablet Take 1 tablet by mouth daily      spironolactone (ALDACTONE) 25 MG tablet TAKE 2 TABLETS (50 MG) BY MOUTH DAILY 90 tablet 3     No current facility-administered medications for this visit.      Vitals:  There were no vitals taken for this visit.    Exam:  GEN: Pleasant female in NAD  RESP: Breathing is non labored  NEURO: A/O    LABS:   CMP  Recent Labs   Lab Test 04/16/24  1423 10/10/23  1629 05/17/23  1414 12/19/22  1024 07/19/21  1713 06/08/21  1652 10/05/20  0822 07/02/20  1545 02/29/20  0910   * 130*  127* 131*   < > 128* 131* 131* 130*   POTASSIUM 5.2 4.7 5.1 4.6   < > 4.3 4.2 3.9 4.5   CHLORIDE 94* 94* 92* 96*   < > 97 99 99 98   CO2 22 24 24 26   < > 24 26 29 26   ANIONGAP 14 12 11 9   < > 7 6 3 6   GLC 83 95 92 83   < > 82 80 86 98   BUN 21.1 18.9 17.8 11.2   < > 12 14 14 14   CR 0.86 0.86 0.78 0.71   < > 0.59 0.76 0.68 0.58   GFRESTIMATED 77 78 88 >90   < > >90 88 >90 >90   GFRESTBLACK  --   --   --   --   --  >90 >90 >90 >90   KRIS 9.4 9.1 9.0 9.1   < > 9.1 9.3 9.1 9.1    < > = values in this interval not displayed.     Recent Labs   Lab Test 03/27/23  0935 07/19/21 1713 06/08/21  1652 02/29/20  0910   BILITOTAL 0.5 0.3 0.3 0.4   ALKPHOS 80 93 96 87   ALT 18 24 28 32   AST 31 23 23 25     CBC  Recent Labs   Lab Test 05/17/23  1414 12/19/22  1024 04/25/22  1823 07/19/21  1714 06/08/21  1652 10/05/20  0822 02/29/20  0910   HGB 12.0 12.1 13.1 12.3 12.4 13.3 13.7   WBC  --   --   --  6.7 8.0 5.5 5.8   RBC  --   --   --  3.99 3.96 4.26 4.43   HCT  --   --   --  36.0 36.5 39.3 40.3   MCV  --   --   --  90 92 92 91   MCH  --   --   --  30.8 31.3 31.2 30.9   MCHC  --   --   --  34.2 34.0 33.8 34.0   RDW  --   --   --  11.8 11.6 12.1 12.1   PLT  --   --   --  341 336 316 328     URINE STUDIES  Recent Labs   Lab Test 10/05/20  0823 12/27/18  1433 12/22/17  1521   COLOR Yellow Straw Yellow   APPEARANCE Clear Clear Clear   URINEGLC Negative Negative Negative   URINEBILI Negative Negative Negative   URINEKETONE Negative Negative 15*   SG 1.020 1.008 1.015   UBLD Trace* Negative Small*   URINEPH 7.5* 7.0 6.5   PROTEIN 30* Negative Negative   UROBILINOGEN 0.2  --  0.2   NITRITE Negative Negative Negative   LEUKEST Negative Negative Negative   RBCU O - 2 1 O - 2   WBCU 0 - 5 <1 O - 2     Recent Labs   Lab Test 04/25/22  1829 06/08/21  1652 10/05/20  0823 07/02/20  1546   UTPG 1.43* 0.62* 0.29* 0.58*     PTH  Recent Labs   Lab Test 12/19/22  1024   PTHI 60     IRON STUDIES  No lab results found.    Mariah Renee,  NP

## 2024-04-25 ENCOUNTER — TELEPHONE (OUTPATIENT)
Dept: NEPHROLOGY | Facility: CLINIC | Age: 59
End: 2024-04-25
Payer: COMMERCIAL

## 2024-04-25 NOTE — TELEPHONE ENCOUNTER
Patient confirmed scheduled appointment:     Date: 10/23   Time: 2:30 PM  Visit type: Return nephrology - Virtual visit  Provider: Dominga Renee  Location: [facility]  Testing/imaging: [facility] on 10/16 @ 2:30 PM  Additonal Notes:

## 2024-05-14 ENCOUNTER — HOSPITAL ENCOUNTER (OUTPATIENT)
Dept: MAMMOGRAPHY | Facility: CLINIC | Age: 59
Discharge: HOME | End: 2024-05-14
Attending: PEDIATRICS | Admitting: PEDIATRICS
Payer: COMMERCIAL

## 2024-05-14 DIAGNOSIS — Z12.31 VISIT FOR SCREENING MAMMOGRAM: ICD-10-CM

## 2024-05-14 PROCEDURE — 77063 BREAST TOMOSYNTHESIS BI: CPT

## 2024-06-02 ENCOUNTER — HEALTH MAINTENANCE LETTER (OUTPATIENT)
Age: 59
End: 2024-06-02

## 2024-06-20 NOTE — DISCHARGE INSTRUCTIONS
06/20/24                            Sebastian Reyes Perez  [address] [place] [id number]    To Whom It May Concern:    This is to certify Praveendiana Reyes Perez was evaluated with Haroon Stanley MD on 06/20/24 and Dad, Jonathan Reyes accompanied him at his visit.     RESTRICTIONS: NONE            Electronically signed by:  Haroon Stanley MD  [facility]  [address] [address] [place] [id number]  Dept Phone: [phone]        Discharge Instructions  Headache    You were seen today for a headache. Headaches may be caused by many different things such as muscle tension, sinus inflammation, anxiety and stress, having too little sleep, too much alcohol, some medical conditions or injury. You may have a migraine, which is caused by changes in the blood vessels in your head.  At this time your provider does not find that your headache is a sign of anything dangerous or life-threatening.  However, sometimes the signs of serious illness do not show up right away.      Generally, every Emergency Department visit should have a follow-up clinic visit with either a primary or a specialty clinic/provider. Please follow-up as instructed by your emergency provider today.    Return to the Emergency Department if:  You get a new fever of 100.4 F or higher.  Your headache gets much worse.  You get a stiff neck with your headache.  You get a new headache that is significantly different or worse than headaches you have had before.  You are vomiting (throwing up) and cannot keep food or water down.  You have blurry or double vision or other problems with your eyes.  You have a new weakness on one side of your body.  You have difficulty with balance which is new.  You or your family thinks you are confused.  You have a seizure.    What can I do to help myself?  Pain medications - You may take a pain medication such as Tylenol  (acetaminophen), Advil , Motrin  (ibuprofen) or Aleve  (naproxen).  Take a pain reliever as soon as you notice symptoms.  Starting medications as soon as you start to have symptoms may lessen the amount of pain you have.  Relaxing in a quiet, dark room may help.  Get enough sleep and eat meals regularly.  You may need to watch for certain foods or other things which may trigger your headaches.  Keeping a journal of your headaches and possible triggers may help you and your primary provider to identify things which you should avoid which  may be causing your headaches.  If you were given a prescription for medicine here today, be sure to read all of the information (including the package insert) that comes with your prescription.  This will include important information about the medicine, its side effects, and any warnings that you need to know about.  The pharmacist who fills the prescription can provide more information and answer questions you may have about the medicine.  If you have questions or concerns that the pharmacist cannot address, please call or return to the Emergency Department.   Remember that you can always come back to the Emergency Department if you are not able to see your regular provider in the amount of time listed above, if you get any new symptoms, or if there is anything that worries you.     Cimzia Pregnancy And Lactation Text: This medication crosses the placenta but can be considered safe in certain situations. Cimzia may be excreted in breast milk.

## 2024-09-09 ENCOUNTER — E-VISIT (OUTPATIENT)
Dept: PEDIATRICS | Facility: CLINIC | Age: 59
End: 2024-09-09
Payer: COMMERCIAL

## 2024-09-09 DIAGNOSIS — R21 RASH AND NONSPECIFIC SKIN ERUPTION: Primary | ICD-10-CM

## 2024-09-09 DIAGNOSIS — T63.441A ALLERGIC REACTION TO BEE STING: ICD-10-CM

## 2024-09-09 PROCEDURE — 99421 OL DIG E/M SVC 5-10 MIN: CPT | Performed by: PEDIATRICS

## 2024-09-10 ENCOUNTER — TELEPHONE (OUTPATIENT)
Dept: PEDIATRICS | Facility: CLINIC | Age: 59
End: 2024-09-10
Payer: COMMERCIAL

## 2024-09-10 RX ORDER — PREDNISONE 10 MG/1
TABLET ORAL
Qty: 20 TABLET | Refills: 0 | Status: SHIPPED | OUTPATIENT
Start: 2024-09-10

## 2024-09-10 RX ORDER — EPINEPHRINE 0.3 MG/.3ML
0.3 INJECTION SUBCUTANEOUS PRN
Qty: 2 EACH | Refills: 1 | Status: SHIPPED | OUTPATIENT
Start: 2024-09-10

## 2024-09-10 NOTE — LETTER
September 12, 2024      Josy John  [address] [place] [address]        Dear Josy,       We care about your health and have reviewed your health plan including your medical conditions, medications, and lab results.  Based on this review, it is recommended that you follow up regarding the following health topic(s):  -Wellness (Physical) Visit     We recommend you take the following action(s):  -schedule a WELLNESS (Physical) APPOINTMENT.  We will perform the following labs: Lipids.     Please call us at the [facility] - [phone] (or use [facility]) to address the above recommendations.     Thank you for trusting [facility] and we appreciate the opportunity to serve you.  We look forward to supporting your healthcare needs in the future.    Healthy Regards,    Your Health Care Team  [facility]

## 2024-09-10 NOTE — TELEPHONE ENCOUNTER
LVM. Per provider, schedule preventative prior to year end. Okay to use a [facility].     Josefina Johnson on 9/10/2024 at 1:38 PM

## 2024-10-11 ENCOUNTER — PATIENT OUTREACH (OUTPATIENT)
Dept: CARE COORDINATION | Facility: CLINIC | Age: 59
End: 2024-10-11
Payer: COMMERCIAL

## 2024-10-16 ENCOUNTER — LAB (OUTPATIENT)
Dept: LAB | Facility: CLINIC | Age: 59
End: 2024-10-16
Payer: COMMERCIAL

## 2024-10-16 DIAGNOSIS — R80.9 ALBUMINURIA: ICD-10-CM

## 2024-10-16 DIAGNOSIS — I10 HYPERTENSION, ESSENTIAL: ICD-10-CM

## 2024-10-16 LAB
ALBUMIN SERPL BCG-MCNC: 4.5 G/DL (ref 3.5–5.2)
ANION GAP SERPL CALCULATED.3IONS-SCNC: 11 MMOL/L (ref 7–15)
BUN SERPL-MCNC: 17 MG/DL (ref 8–23)
CALCIUM SERPL-MCNC: 9.6 MG/DL (ref 8.8–10.4)
CHLORIDE SERPL-SCNC: 90 MMOL/L (ref 98–107)
CREAT SERPL-MCNC: 0.84 MG/DL (ref 0.51–0.95)
EGFRCR SERPLBLD CKD-EPI 2021: 80 ML/MIN/1.73M2
GLUCOSE SERPL-MCNC: 93 MG/DL (ref 70–99)
HCO3 SERPL-SCNC: 23 MMOL/L (ref 22–29)
PHOSPHATE SERPL-MCNC: 4.4 MG/DL (ref 2.5–4.5)
POTASSIUM SERPL-SCNC: 4.9 MMOL/L (ref 3.4–5.3)
SODIUM SERPL-SCNC: 124 MMOL/L (ref 135–145)

## 2024-10-16 PROCEDURE — 82570 ASSAY OF URINE CREATININE: CPT

## 2024-10-16 PROCEDURE — 80069 RENAL FUNCTION PANEL: CPT

## 2024-10-16 PROCEDURE — 82043 UR ALBUMIN QUANTITATIVE: CPT

## 2024-10-16 PROCEDURE — 36415 COLL VENOUS BLD VENIPUNCTURE: CPT

## 2024-10-17 LAB
CREAT UR-MCNC: 28.2 MG/DL
MICROALBUMIN UR-MCNC: 33.1 MG/L
MICROALBUMIN/CREAT UR: 117.38 MG/G CR (ref 0–25)

## 2024-10-23 ENCOUNTER — VIRTUAL VISIT (OUTPATIENT)
Dept: NEPHROLOGY | Facility: CLINIC | Age: 59
End: 2024-10-23
Payer: COMMERCIAL

## 2024-10-23 VITALS — HEIGHT: 69 IN | WEIGHT: 188 LBS | BODY MASS INDEX: 27.85 KG/M2

## 2024-10-23 DIAGNOSIS — I10 HYPERTENSION, ESSENTIAL: Primary | ICD-10-CM

## 2024-10-23 DIAGNOSIS — E87.1 HYPONATREMIA: ICD-10-CM

## 2024-10-23 DIAGNOSIS — R80.9 ALBUMINURIA: ICD-10-CM

## 2024-10-23 PROCEDURE — 99442 PR PHYSICIAN TELEPHONE EVALUATION 11-20 MIN: CPT | Mod: 93

## 2024-10-23 ASSESSMENT — PAIN SCALES - GENERAL: PAINLEVEL_OUTOF10: NO PAIN (0)

## 2024-10-23 NOTE — LETTER
"10/23/2024       RE: Josy John  [address] [place] [address]     Dear Colleague,    Thank you for referring your patient, Josy John, to the [facility] NEPHROLOGY [facility]IC [place] at [facility]. Please see a copy of my visit note below.    Virtual Visit Details    Type of service:  Video Visit     Originating Location (pt. Location): Home    Distant Location (provider location):  On-site  Platform used for Video Visit: [facility]    Nephrology Telephone Visit 10/23/24    Assessment and Plan:    HTN - Currently uncontrolled w/o edema. B/P had been well controlled after switching from Toprol to Nifedipine. Her exercise endurance improved but she did predictably develop mild edema so she discontinued the Nifedipine. For a while b/ps remained controlled but more recently have risen into the 140-160/ range.    - Current regimen:      Nifedipine ER 30 mg every day ( not taking)     Losartan 50 mg bid     Spironolactone 50 mg every day       Josy is willing to resume Nifedipine because she is happy with how she feels off Toprol.        Continue daily b/ps and RN will call for update next week    2. Renal function/albuminuria - Creat 0.8, UACR up to 117.3 mg/gCr from 79 mg/gCr in setting of uncontrolled HTN.    Etiology for her albuminuria per Dr Freire \"  I suspect she sustained some renal injury at an early age as a result of recurrent UTI/pyelonephritis form urinary reflux (that has since been corrected with ureteral reimplantation).  As a result, she likely has some mild scarring resulting in albuminuria that has remained well controlled when on RAAS blockade with lisinopril and BP under decent control\"   - Will continue Losartan and Spironolactone   - Resume Nifedipine   - Reviewed the importance of good b/p control    3. Chronic hyponatremia - Na 124 today from previous low 130's. Not drinking excessive fluids but not eating much protein. No edema " except end of day. Suspect this is SIADH from her selective serotonin reuptake inhibitor.    - Increase dietary protein   - Recheck renal panel, urine Na and Urine Osm in 2 wks    4. Electrolytes - K 4.9 on ARB/MRA   - Continue to monitor    5. Disposition - RTC 6 months for follow up w/labs prior    Assessment and plan was discussed with patient and she voiced her understanding and agreement.    Reason for Visit:  HTN/albuminuria/hyponatremia    HPI:  Ms John is a 60 yo female with HTN, Albuminuria, chronic hyponatremia present today for routine HTN/albuminuria follow up.   Last seen in clinic by me 4/19/24.   Toprol XL 50 mg every day switched to Nifedipine ER 30 mg every day due to c/o exercise intolerance/geovanny/fatigue  Baseline creat 0.8    ROS:   Joys reports that b/ps were well controlled after switching from Toprol to Nifedipine. Her exercise endurance improved but she did predictably develop mild edema so she discontinued the Nifedipine. For a while b/ps remained controlled but more recently have risen into the 140-160/ range.   No CP  She is menopausal.   No GI concerns  No voiding issues  Energy level is good and walks the dog daily  Appetite good but doesn't eat much protein. Trying to increase protein in her diet  Josy notes stress issues around her parents. 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  Social History     Tobacco Use     Smoking status: Never     Smokeless tobacco: Never   Substance Use Topics     Alcohol  "use: Yes     Comment: rare       Allergies:  Allergies   Allergen Reactions     Zoloft      extreme fatigue and nausea       Medications:  Current Outpatient Medications   Medication Sig Dispense Refill     Cholecalciferol (VITAMIN D3 PO) Take 1,000 Units by mouth daily       citalopram (CELEXA) 20 MG tablet Take 1 tablet (20 mg) by mouth daily 90 tablet 3     EPINEPHrine (ANY BX GENERIC EQUIV) 0.3 MG/0.3ML injection 2-pack Inject 0.3 mLs (0.3 mg) into the muscle as needed for anaphylaxis. May repeat one time in 5-15 minutes if response to initial dose is inadequate. 2 each 1     losartan (COZAAR) 50 MG tablet Take 1 tablet (50 mg) by mouth 2 times daily 180 tablet 11     magnesium 250 MG tablet Take 1 tablet by mouth daily       NIFEdipine ER OSMOTIC (PROCARDIA XL) 30 MG 24 hr tablet Take 1 tablet (30 mg) by mouth daily 90 tablet 2     spironolactone (ALDACTONE) 50 MG tablet Take 1 tablet (50 mg) by mouth daily 90 tablet 3     No current facility-administered medications for this visit.      Vitals:  Ht 1.753 m (5' 9\")   Wt 85.3 kg (188 lb)   BMI 27.76 kg/m      Exam:  No exam performed    LABS:   CMP  Recent Labs   Lab Test 10/16/24  1421 04/16/24  1423 10/10/23  1629 05/17/23  1414 07/19/21  1713 06/08/21  1652 10/05/20  0822 07/02/20  1545 02/29/20  0910   * 130* 130* 127*   < > 128* 131* 131* 130*   POTASSIUM 4.9 5.2 4.7 5.1   < > 4.3 4.2 3.9 4.5   CHLORIDE 90* 94* 94* 92*   < > 97 99 99 98   CO2 23 22 24 24   < > 24 26 29 26   ANIONGAP 11 14 12 11   < > 7 6 3 6   GLC 93 83 95 92   < > 82 80 86 98   BUN 17.0 21.1 18.9 17.8   < > 12 14 14 14   CR 0.84 0.86 0.86 0.78   < > 0.59 0.76 0.68 0.58   GFRESTIMATED 80 77 78 88   < > >90 88 >90 >90   GFRESTBLACK  --   --   --   --   --  >90 >90 >90 >90   KRIS 9.6 9.4 9.1 9.0   < > 9.1 9.3 9.1 9.1    < > = values in this interval not displayed.     Recent Labs   Lab Test 03/27/23  0935 07/19/21  1713 06/08/21  1652 02/29/20  0910   BILITOTAL 0.5 0.3 0.3 0.4 "   ALKPHOS 80 93 96 87   ALT 18 24 28 32   AST 31 23 23 25     CBC  Recent Labs   Lab Test 05/17/23  1414 12/19/22  1024 04/25/22  1823 07/19/21  1714 06/08/21  1652 10/05/20  0822 02/29/20  0910   HGB 12.0 12.1 13.1 12.3 12.4 13.3 13.7   WBC  --   --   --  6.7 8.0 5.5 5.8   RBC  --   --   --  3.99 3.96 4.26 4.43   HCT  --   --   --  36.0 36.5 39.3 40.3   MCV  --   --   --  90 92 92 91   MCH  --   --   --  30.8 31.3 31.2 30.9   MCHC  --   --   --  34.2 34.0 33.8 34.0   RDW  --   --   --  11.8 11.6 12.1 12.1   PLT  --   --   --  341 336 316 328     URINE STUDIES  Recent Labs   Lab Test 10/05/20  0823 12/27/18  1433 12/22/17  1521   COLOR Yellow Straw Yellow   APPEARANCE Clear Clear Clear   URINEGLC Negative Negative Negative   URINEBILI Negative Negative Negative   URINEKETONE Negative Negative 15*   SG 1.020 1.008 1.015   UBLD Trace* Negative Small*   URINEPH 7.5* 7.0 6.5   PROTEIN 30* Negative Negative   UROBILINOGEN 0.2  --  0.2   NITRITE Negative Negative Negative   LEUKEST Negative Negative Negative   RBCU O - 2 1 O - 2   WBCU 0 - 5 <1 O - 2     Recent Labs   Lab Test 04/25/22  1829 06/08/21  1652 10/05/20  0823 07/02/20  1546   UTPG 1.43* 0.62* 0.29* 0.58*     PTH  Recent Labs   Lab Test 12/19/22  1024   PTHI 60     IRON STUDIES  No lab results found.    Duration of call: 20 min    Mariah Renee NP        Again, thank you for allowing me to participate in the care of your patient.      Sincerely,    Mariah Renee NP

## 2024-10-24 ENCOUNTER — TELEPHONE (OUTPATIENT)
Dept: NEPHROLOGY | Facility: CLINIC | Age: 59
End: 2024-10-24
Payer: COMMERCIAL

## 2024-10-24 NOTE — TELEPHONE ENCOUNTER
Left Voicemail (1st Attempt) and Sent Mychart (1st Attempt) for the patient to call back and schedule the following:    Appointment type: Return Nephrology  Provider: Dominga Renee  Return date: around 4/21/2025  Specialty phone number: [phone]  Additional appointment(s) needed: Labs prior  Additonal Notes: 6 month follow up

## 2024-10-24 NOTE — PROGRESS NOTES
"Nephrology Telephone Visit 10/23/24    Assessment and Plan:    HTN - Currently uncontrolled w/o edema. B/P had been well controlled after switching from Toprol to Nifedipine. Her exercise endurance improved but she did predictably develop mild edema so she discontinued the Nifedipine. For a while b/ps remained controlled but more recently have risen into the 140-160/ range.    - Current regimen:      Nifedipine ER 30 mg every day ( not taking)     Losartan 50 mg bid     Spironolactone 50 mg every day       Josy is willing to resume Nifedipine because she is happy with how she feels off Toprol.        Continue daily b/ps and RN will call for update next week    2. Renal function/albuminuria - Creat 0.8, UACR up to 117.3 mg/gCr from 79 mg/gCr in setting of uncontrolled HTN.    Etiology for her albuminuria per Dr Freire \"  I suspect she sustained some renal injury at an early age as a result of recurrent UTI/pyelonephritis form urinary reflux (that has since been corrected with ureteral reimplantation).  As a result, she likely has some mild scarring resulting in albuminuria that has remained well controlled when on RAAS blockade with lisinopril and BP under decent control\"   - Will continue Losartan and Spironolactone   - Resume Nifedipine   - Reviewed the importance of good b/p control    3. Chronic hyponatremia - Na 124 today from previous low 130's. Not drinking excessive fluids but not eating much protein. No edema except end of day. Suspect this is SIADH from her selective serotonin reuptake inhibitor.    - Increase dietary protein   - Recheck renal panel, urine Na and Urine Osm in 2 wks    4. Electrolytes - K 4.9 on ARB/MRA   - Continue to monitor    5. Disposition - RTC 6 months for follow up w/labs prior    Assessment and plan was discussed with patient and she voiced her understanding and agreement.    Reason for Visit:  HTN/albuminuria/hyponatremia    HPI:  Ms John is a 58 yo female with HTN, Albuminuria, " chronic hyponatremia present today for routine HTN/albuminuria follow up.   Last seen in clinic by me 4/19/24.   Toprol XL 50 mg every day switched to Nifedipine ER 30 mg every day due to c/o exercise intolerance/geovanny/fatigue  Baseline creat 0.8    ROS:   Josy reports that b/ps were well controlled after switching from Toprol to Nifedipine. Her exercise endurance improved but she did predictably develop mild edema so she discontinued the Nifedipine. For a while b/ps remained controlled but more recently have risen into the 140-160/ range.   No CP  She is menopausal.   No GI concerns  No voiding issues  Energy level is good and walks the dog daily  Appetite good but doesn't eat much protein. Trying to increase protein in her diet  Josy notes stress issues around her parents. 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  Social History     Tobacco Use    Smoking status: Never    Smokeless tobacco: Never   Substance Use Topics    Alcohol use: Yes     Comment: rare       Allergies:  Allergies   Allergen Reactions    Zoloft      extreme fatigue and nausea       Medications:  Current Outpatient Medications   Medication Sig Dispense Refill    Cholecalciferol (VITAMIN D3 PO) Take 1,000 Units by mouth daily      citalopram (CELEXA) 20 MG tablet Take 1 tablet (20 mg) by mouth daily 90 tablet 3    EPINEPHrine (ANY BX GENERIC EQUIV) 0.3 MG/0.3ML injection 2-pack Inject 0.3 mLs (0.3 mg) into the muscle as needed for anaphylaxis. May repeat one time in 5-15  "minutes if response to initial dose is inadequate. 2 each 1    losartan (COZAAR) 50 MG tablet Take 1 tablet (50 mg) by mouth 2 times daily 180 tablet 11    magnesium 250 MG tablet Take 1 tablet by mouth daily      NIFEdipine ER OSMOTIC (PROCARDIA XL) 30 MG 24 hr tablet Take 1 tablet (30 mg) by mouth daily 90 tablet 2    spironolactone (ALDACTONE) 50 MG tablet Take 1 tablet (50 mg) by mouth daily 90 tablet 3     No current facility-administered medications for this visit.      Vitals:  Ht 1.753 m (5' 9\")   Wt 85.3 kg (188 lb)   BMI 27.76 kg/m      Exam:  No exam performed    LABS:   CMP  Recent Labs   Lab Test 10/16/24  1421 04/16/24  1423 10/10/23  1629 05/17/23  1414 07/19/21  1713 06/08/21  1652 10/05/20  0822 07/02/20  1545 02/29/20  0910   * 130* 130* 127*   < > 128* 131* 131* 130*   POTASSIUM 4.9 5.2 4.7 5.1   < > 4.3 4.2 3.9 4.5   CHLORIDE 90* 94* 94* 92*   < > 97 99 99 98   CO2 23 22 24 24   < > 24 26 29 26   ANIONGAP 11 14 12 11   < > 7 6 3 6   GLC 93 83 95 92   < > 82 80 86 98   BUN 17.0 21.1 18.9 17.8   < > 12 14 14 14   CR 0.84 0.86 0.86 0.78   < > 0.59 0.76 0.68 0.58   GFRESTIMATED 80 77 78 88   < > >90 88 >90 >90   GFRESTBLACK  --   --   --   --   --  >90 >90 >90 >90   KRIS 9.6 9.4 9.1 9.0   < > 9.1 9.3 9.1 9.1    < > = values in this interval not displayed.     Recent Labs   Lab Test 03/27/23  0935 07/19/21  1713 06/08/21  1652 02/29/20  0910   BILITOTAL 0.5 0.3 0.3 0.4   ALKPHOS 80 93 96 87   ALT 18 24 28 32   AST 31 23 23 25     CBC  Recent Labs   Lab Test 05/17/23  1414 12/19/22  1024 04/25/22  1823 07/19/21  1714 06/08/21  1652 10/05/20  0822 02/29/20  0910   HGB 12.0 12.1 13.1 12.3 12.4 13.3 13.7   WBC  --   --   --  6.7 8.0 5.5 5.8   RBC  --   --   --  3.99 3.96 4.26 4.43   HCT  --   --   --  36.0 36.5 39.3 40.3   MCV  --   --   --  90 92 92 91   MCH  --   --   --  30.8 31.3 31.2 30.9   MCHC  --   --   --  34.2 34.0 33.8 34.0   RDW  --   --   --  11.8 11.6 12.1 12.1   PLT  --   --   --  " 341 336 316 328     URINE STUDIES  Recent Labs   Lab Test 10/05/20  0823 12/27/18  1433 12/22/17  1521   COLOR Yellow Straw Yellow   APPEARANCE Clear Clear Clear   URINEGLC Negative Negative Negative   URINEBILI Negative Negative Negative   URINEKETONE Negative Negative 15*   SG 1.020 1.008 1.015   UBLD Trace* Negative Small*   URINEPH 7.5* 7.0 6.5   PROTEIN 30* Negative Negative   UROBILINOGEN 0.2  --  0.2   NITRITE Negative Negative Negative   LEUKEST Negative Negative Negative   RBCU O - 2 1 O - 2   WBCU 0 - 5 <1 O - 2     Recent Labs   Lab Test 04/25/22  1829 06/08/21  1652 10/05/20  0823 07/02/20  1546   UTPG 1.43* 0.62* 0.29* 0.58*     PTH  Recent Labs   Lab Test 12/19/22  1024   PTHI 60     IRON STUDIES  No lab results found.    Duration of call: 20 min    Mariah Renee NP

## 2024-12-02 ENCOUNTER — TRANSFERRED RECORDS (OUTPATIENT)
Dept: HEALTH INFORMATION MANAGEMENT | Facility: CLINIC | Age: 59
End: 2024-12-02
Payer: COMMERCIAL

## 2024-12-16 ENCOUNTER — OFFICE VISIT (OUTPATIENT)
Dept: NEPHROLOGY | Facility: CLINIC | Age: 59
End: 2024-12-16
Payer: COMMERCIAL

## 2024-12-16 ENCOUNTER — LAB (OUTPATIENT)
Dept: LAB | Facility: CLINIC | Age: 59
End: 2024-12-16
Payer: COMMERCIAL

## 2024-12-16 VITALS
WEIGHT: 184 LBS | DIASTOLIC BLOOD PRESSURE: 81 MMHG | BODY MASS INDEX: 27.25 KG/M2 | SYSTOLIC BLOOD PRESSURE: 126 MMHG | HEART RATE: 69 BPM | TEMPERATURE: 98.4 F | OXYGEN SATURATION: 96 % | HEIGHT: 69 IN

## 2024-12-16 DIAGNOSIS — E87.1 HYPONATREMIA: ICD-10-CM

## 2024-12-16 DIAGNOSIS — R80.9 ALBUMINURIA: ICD-10-CM

## 2024-12-16 DIAGNOSIS — R60.9 EDEMA, UNSPECIFIED TYPE: Primary | ICD-10-CM

## 2024-12-16 DIAGNOSIS — I10 HYPERTENSION, ESSENTIAL: ICD-10-CM

## 2024-12-16 LAB
ALBUMIN SERPL BCG-MCNC: 4.6 G/DL (ref 3.5–5.2)
ANION GAP SERPL CALCULATED.3IONS-SCNC: 11 MMOL/L (ref 7–15)
BUN SERPL-MCNC: 18.7 MG/DL (ref 8–23)
CALCIUM SERPL-MCNC: 9.4 MG/DL (ref 8.8–10.4)
CHLORIDE SERPL-SCNC: 101 MMOL/L (ref 98–107)
CREAT SERPL-MCNC: 0.9 MG/DL (ref 0.51–0.95)
CREAT UR-MCNC: 68.7 MG/DL
EGFRCR SERPLBLD CKD-EPI 2021: 73 ML/MIN/1.73M2
GLUCOSE SERPL-MCNC: 92 MG/DL (ref 70–99)
HCO3 SERPL-SCNC: 24 MMOL/L (ref 22–29)
MICROALBUMIN UR-MCNC: 67.3 MG/L
MICROALBUMIN/CREAT UR: 97.96 MG/G CR (ref 0–25)
OSMOLALITY UR: 397 MMOL/KG (ref 100–1200)
PHOSPHATE SERPL-MCNC: 3.6 MG/DL (ref 2.5–4.5)
POTASSIUM SERPL-SCNC: 4.3 MMOL/L (ref 3.4–5.3)
SODIUM SERPL-SCNC: 136 MMOL/L (ref 135–145)
SODIUM UR-SCNC: 35 MMOL/L

## 2024-12-16 PROCEDURE — 84300 ASSAY OF URINE SODIUM: CPT

## 2024-12-16 PROCEDURE — 82570 ASSAY OF URINE CREATININE: CPT

## 2024-12-16 PROCEDURE — 80069 RENAL FUNCTION PANEL: CPT

## 2024-12-16 PROCEDURE — 82043 UR ALBUMIN QUANTITATIVE: CPT

## 2024-12-16 PROCEDURE — 99213 OFFICE O/P EST LOW 20 MIN: CPT

## 2024-12-16 PROCEDURE — 83935 ASSAY OF URINE OSMOLALITY: CPT

## 2024-12-16 PROCEDURE — 99214 OFFICE O/P EST MOD 30 MIN: CPT

## 2024-12-16 PROCEDURE — 36415 COLL VENOUS BLD VENIPUNCTURE: CPT

## 2024-12-16 ASSESSMENT — PAIN SCALES - GENERAL: PAINLEVEL_OUTOF10: NO PAIN (0)

## 2024-12-16 NOTE — LETTER
"12/16/2024       RE: Josy John  [address] [place] [address]     Dear Colleague,    Thank you for referring your patient, Josy John, to the [facility] [place] at [facility]. Please see a copy of my visit note below.    Nephrology Clinic Visit 12/16/24    Assessment and Plan:    HTN - Well controlled w/minimal edema. Clinic b/ps 121-132/81-86. HR 69 . Weight is 184# from 188# last visit. We restarted Nifedipine ER 30 mg q HS at our last visit in October. She had discontinued it due to mild edema prior to that visit. Bps had risen to 140-160/ range. Now with much better control and trace edema per my exam. She has been intolerant of BB and other CCB so options limited to second line therapy. We discussed this today        Labs had not resulted at the time of our visit    - Current regimen:      Nifedipine ER 30 mg q HS     Losartan 50 mg bid     Spironolactone 50 mg every day       - Na looks good today but given recurrent hyponatremia I'm hesitant to increase her Spironolactone. However, we could use a low dose loop for intermit edema, such as Furosemide 20 mg every day prn for edema    2. Renal function/albuminuria - Creat 0.9, UACR improved to 97 mg/gCr from 117.3 mg/gCr last visit with improvement in blood pressures.     Etiology for her albuminuria per Dr Freire \"  I suspect she sustained some renal injury at an early age as a result of recurrent UTI/pyelonephritis form urinary reflux (that has since been corrected with ureteral reimplantation).  As a result, she likely has some mild scarring resulting in albuminuria that has remained well controlled when on RAAS blockade and BP under decent control\"   - Will continue Losartan and Spironolactone   - Blood pressure controlled   - Next option for treating her albuminuria would be SGLT2i    3. Chronic hyponatremia - Na 136 today. Was 124 last visit. Usually in the low " 130's so clearly there is an element of excessive fluid intake or poor protein intake given nothing else has changed in her med regimen. Only has trace edema. No pain/nausea. Has been on Citalopram for years and just started on Spironolactone 2022.  Marcelo and Uosm are normal    4. Electrolytes - K 4.3 on ARB/MRA   - Continue to monitor    5. Disposition - RTC 4/18/25 for follow up w/labs prior    Assessment and plan was discussed with patient and she voiced her understanding and agreement.    Reason for Visit:  HTN/albuminuria/hyponatremia    HPI:  Ms Jhon is a 58 yo female with HTN, Albuminuria, chronic hyponatremia present today for concerns regarding edema  Last seen in clinic by me 10/23/24.   Nifedipine ER 30 mg every day restarted   Baseline creat 0.8    ROS:   Josy presents today with concerns regarding feeling bloated, edematous. She notes legs feel tight, rings feel tight  She also reports facial flushing in the morning upon arising and just a sensation that she doesn't feel right   Still taking her b/p regimen as prescribed with good b/p control.   No dyspnea  Not consuming fluids excessively  Has been on Citalopram for years  Was started on Spironolactone around 5/22 primarily to address her albuminuria   Exercising regularly and continues to have good exercise endurance off beta blockade  She is post menopausal.   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  Social History     Tobacco Use     Smoking status: Never  "    Smokeless tobacco: Never   Substance Use Topics     Alcohol use: Yes     Comment: rare       Allergies:  Allergies   Allergen Reactions     Zoloft      extreme fatigue and nausea       Medications:  Current Outpatient Medications   Medication Sig Dispense Refill     Cholecalciferol (VITAMIN D3 PO) Take 1,000 Units by mouth daily       citalopram (CELEXA) 20 MG tablet Take 1 tablet (20 mg) by mouth daily 90 tablet 3     EPINEPHrine (ANY BX GENERIC EQUIV) 0.3 MG/0.3ML injection 2-pack Inject 0.3 mLs (0.3 mg) into the muscle as needed for anaphylaxis. May repeat one time in 5-15 minutes if response to initial dose is inadequate. 2 each 1     losartan (COZAAR) 50 MG tablet Take 1 tablet (50 mg) by mouth 2 times daily 180 tablet 11     magnesium 250 MG tablet Take 1 tablet by mouth daily       NIFEdipine ER OSMOTIC (PROCARDIA XL) 30 MG 24 hr tablet Take 1 tablet (30 mg) by mouth daily 90 tablet 2     spironolactone (ALDACTONE) 50 MG tablet Take 1 tablet (50 mg) by mouth daily 90 tablet 3     No current facility-administered medications for this visit.      Vitals:  /81   Pulse 69   Temp 98.4  F (36.9  C) (Oral)   Ht 1.753 m (5' 9\")   Wt 83.5 kg (184 lb)   SpO2 96%   BMI 27.17 kg/m      Exam:  GEN: Pleasant female in NAD  CARDIAC: RRR  LUNGS CTA  ABDOMEN: Non distended  EXT: Trace edema  NEURO A/O    LABS:   CMP  Recent Labs   Lab Test 12/16/24  1122 10/16/24  1421 04/16/24  1423 10/10/23  1629 07/19/21  1713 06/08/21  1652 10/05/20  0822 07/02/20  1545 02/29/20  0910    124* 130* 130*   < > 128* 131* 131* 130*   POTASSIUM 4.3 4.9 5.2 4.7   < > 4.3 4.2 3.9 4.5   CHLORIDE 101 90* 94* 94*   < > 97 99 99 98   CO2 24 23 22 24   < > 24 26 29 26   ANIONGAP 11 11 14 12   < > 7 6 3 6   GLC 92 93 83 95   < > 82 80 86 98   BUN 18.7 17.0 21.1 18.9   < > 12 14 14 14   CR 0.90 0.84 0.86 0.86   < > 0.59 0.76 0.68 0.58   GFRESTIMATED 73 80 77 78   < > >90 88 >90 >90   GFRESTBLACK  --   --   --   --   --  >90 >90 " >90 >90   KRIS 9.4 9.6 9.4 9.1   < > 9.1 9.3 9.1 9.1    < > = values in this interval not displayed.     Recent Labs   Lab Test 03/27/23  0935 07/19/21  1713 06/08/21  1652 02/29/20  0910   BILITOTAL 0.5 0.3 0.3 0.4   ALKPHOS 80 93 96 87   ALT 18 24 28 32   AST 31 23 23 25     CBC  Recent Labs   Lab Test 05/17/23  1414 12/19/22  1024 04/25/22  1823 07/19/21  1714 06/08/21  1652 10/05/20  0822 02/29/20  0910   HGB 12.0 12.1 13.1 12.3 12.4 13.3 13.7   WBC  --   --   --  6.7 8.0 5.5 5.8   RBC  --   --   --  3.99 3.96 4.26 4.43   HCT  --   --   --  36.0 36.5 39.3 40.3   MCV  --   --   --  90 92 92 91   MCH  --   --   --  30.8 31.3 31.2 30.9   MCHC  --   --   --  34.2 34.0 33.8 34.0   RDW  --   --   --  11.8 11.6 12.1 12.1   PLT  --   --   --  341 336 316 328     URINE STUDIES  Recent Labs   Lab Test 10/05/20  0823 12/27/18  1433 12/22/17  1521   COLOR Yellow Straw Yellow   APPEARANCE Clear Clear Clear   URINEGLC Negative Negative Negative   URINEBILI Negative Negative Negative   URINEKETONE Negative Negative 15*   SG 1.020 1.008 1.015   UBLD Trace* Negative Small*   URINEPH 7.5* 7.0 6.5   PROTEIN 30* Negative Negative   UROBILINOGEN 0.2  --  0.2   NITRITE Negative Negative Negative   LEUKEST Negative Negative Negative   RBCU O - 2 1 O - 2   WBCU 0 - 5 <1 O - 2     Recent Labs   Lab Test 04/25/22  1829 06/08/21  1652 10/05/20  0823 07/02/20  1546   UTPG 1.43* 0.62* 0.29* 0.58*     PTH  Recent Labs   Lab Test 12/19/22  1024   PTHI 60     IRON STUDIES  No lab results found.    Mariah Renee NP        Again, thank you for allowing me to participate in the care of your patient.      Sincerely,    Mariah Renee NP

## 2024-12-16 NOTE — NURSING NOTE
"Chief Complaint   Patient presents with    RECHECK     Follow Up     /81   Pulse 69   Temp 98.4  F (36.9  C) (Oral)   Ht 1.753 m (5' 9\")   Wt 83.5 kg (184 lb)   SpO2 96%   BMI 27.17 kg/m    Inessa Torres on 12/16/2024 at 1:32 PM    "

## 2024-12-17 ENCOUNTER — PATIENT OUTREACH (OUTPATIENT)
Dept: NEPHROLOGY | Facility: CLINIC | Age: 59
End: 2024-12-17
Payer: COMMERCIAL

## 2024-12-17 RX ORDER — FUROSEMIDE 20 MG/1
20 TABLET ORAL DAILY PRN
Qty: 30 TABLET | Refills: 2 | Status: SHIPPED | OUTPATIENT
Start: 2024-12-17 | End: 2025-12-17

## 2024-12-17 NOTE — PROGRESS NOTES
"Nephrology Clinic Visit 12/16/24    Assessment and Plan:    HTN - Well controlled w/minimal edema. Clinic b/ps 121-132/81-86. HR 69 . Weight is 184# from 188# last visit. We restarted Nifedipine ER 30 mg q HS at our last visit in October. She had discontinued it due to mild edema prior to that visit. Bps had risen to 140-160/ range. Now with much better control and trace edema per my exam. She has been intolerant of BB and other CCB so options limited to second line therapy. We discussed this today        Labs had not resulted at the time of our visit    - Current regimen:      Nifedipine ER 30 mg q HS     Losartan 50 mg bid     Spironolactone 50 mg every day       - Na looks good today but given recurrent hyponatremia I'm hesitant to increase her Spironolactone. However, we could use a low dose loop for intermit edema, such as Furosemide 20 mg every day prn for edema    2. Renal function/albuminuria - Creat 0.9, UACR improved to 97 mg/gCr from 117.3 mg/gCr last visit with improvement in blood pressures.     Etiology for her albuminuria per Dr Freire \"  I suspect she sustained some renal injury at an early age as a result of recurrent UTI/pyelonephritis form urinary reflux (that has since been corrected with ureteral reimplantation).  As a result, she likely has some mild scarring resulting in albuminuria that has remained well controlled when on RAAS blockade and BP under decent control\"   - Will continue Losartan and Spironolactone   - Blood pressure controlled   - Next option for treating her albuminuria would be SGLT2i    3. Chronic hyponatremia - Na 136 today. Was 124 last visit. Usually in the low 130's so clearly there is an element of excessive fluid intake or poor protein intake given nothing else has changed in her med regimen. Only has trace edema. No pain/nausea. Has been on Citalopram for years and just started on Spironolactone 2022.  Marcelo and Uosm are normal    4. Electrolytes - K 4.3 on ARB/MRA   - " Continue to monitor    5. Disposition - RTC 4/18/25 for follow up w/labs prior    Assessment and plan was discussed with patient and she voiced her understanding and agreement.    Reason for Visit:  HTN/albuminuria/hyponatremia    HPI:  Ms John is a 60 yo female with HTN, Albuminuria, chronic hyponatremia present today for concerns regarding edema  Last seen in clinic by me 10/23/24.   Nifedipine ER 30 mg every day restarted   Baseline creat 0.8    ROS:   Josy presents today with concerns regarding feeling bloated, edematous. She notes legs feel tight, rings feel tight  She also reports facial flushing in the morning upon arising and just a sensation that she doesn't feel right   Still taking her b/p regimen as prescribed with good b/p control.   No dyspnea  Not consuming fluids excessively  Has been on Citalopram for years  Was started on Spironolactone around 5/22 primarily to address her albuminuria   Exercising regularly and continues to have good exercise endurance off beta blockade  She is post menopausal.   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  Social History     Tobacco Use    Smoking status: Never    Smokeless tobacco: Never   Substance Use Topics    Alcohol use: Yes     Comment: rare       Allergies:  Allergies   Allergen Reactions    Zoloft      extreme fatigue and nausea       Medications:  Current Outpatient Medications   Medication Sig Dispense Refill    Cholecalciferol (VITAMIN D3 PO) Take 1,000 Units by mouth  "daily      citalopram (CELEXA) 20 MG tablet Take 1 tablet (20 mg) by mouth daily 90 tablet 3    EPINEPHrine (ANY BX GENERIC EQUIV) 0.3 MG/0.3ML injection 2-pack Inject 0.3 mLs (0.3 mg) into the muscle as needed for anaphylaxis. May repeat one time in 5-15 minutes if response to initial dose is inadequate. 2 each 1    losartan (COZAAR) 50 MG tablet Take 1 tablet (50 mg) by mouth 2 times daily 180 tablet 11    magnesium 250 MG tablet Take 1 tablet by mouth daily      NIFEdipine ER OSMOTIC (PROCARDIA XL) 30 MG 24 hr tablet Take 1 tablet (30 mg) by mouth daily 90 tablet 2    spironolactone (ALDACTONE) 50 MG tablet Take 1 tablet (50 mg) by mouth daily 90 tablet 3     No current facility-administered medications for this visit.      Vitals:  /81   Pulse 69   Temp 98.4  F (36.9  C) (Oral)   Ht 1.753 m (5' 9\")   Wt 83.5 kg (184 lb)   SpO2 96%   BMI 27.17 kg/m      Exam:  GEN: Pleasant female in NAD  CARDIAC: RRR  LUNGS CTA  ABDOMEN: Non distended  EXT: Trace edema  NEURO A/O    LABS:   CMP  Recent Labs   Lab Test 12/16/24  1122 10/16/24  1421 04/16/24  1423 10/10/23  1629 07/19/21  1713 06/08/21  1652 10/05/20  0822 07/02/20  1545 02/29/20  0910    124* 130* 130*   < > 128* 131* 131* 130*   POTASSIUM 4.3 4.9 5.2 4.7   < > 4.3 4.2 3.9 4.5   CHLORIDE 101 90* 94* 94*   < > 97 99 99 98   CO2 24 23 22 24   < > 24 26 29 26   ANIONGAP 11 11 14 12   < > 7 6 3 6   GLC 92 93 83 95   < > 82 80 86 98   BUN 18.7 17.0 21.1 18.9   < > 12 14 14 14   CR 0.90 0.84 0.86 0.86   < > 0.59 0.76 0.68 0.58   GFRESTIMATED 73 80 77 78   < > >90 88 >90 >90   GFRESTBLACK  --   --   --   --   --  >90 >90 >90 >90   KRIS 9.4 9.6 9.4 9.1   < > 9.1 9.3 9.1 9.1    < > = values in this interval not displayed.     Recent Labs   Lab Test 03/27/23  0935 07/19/21  1713 06/08/21  1652 02/29/20  0910   BILITOTAL 0.5 0.3 0.3 0.4   ALKPHOS 80 93 96 87   ALT 18 24 28 32   AST 31 23 23 25     CBC  Recent Labs   Lab Test 05/17/23  1414 12/19/22  1024 " 04/25/22  1823 07/19/21  1714 06/08/21  1652 10/05/20  0822 02/29/20  0910   HGB 12.0 12.1 13.1 12.3 12.4 13.3 13.7   WBC  --   --   --  6.7 8.0 5.5 5.8   RBC  --   --   --  3.99 3.96 4.26 4.43   HCT  --   --   --  36.0 36.5 39.3 40.3   MCV  --   --   --  90 92 92 91   MCH  --   --   --  30.8 31.3 31.2 30.9   MCHC  --   --   --  34.2 34.0 33.8 34.0   RDW  --   --   --  11.8 11.6 12.1 12.1   PLT  --   --   --  341 336 316 328     URINE STUDIES  Recent Labs   Lab Test 10/05/20  0823 12/27/18  1433 12/22/17  1521   COLOR Yellow Straw Yellow   APPEARANCE Clear Clear Clear   URINEGLC Negative Negative Negative   URINEBILI Negative Negative Negative   URINEKETONE Negative Negative 15*   SG 1.020 1.008 1.015   UBLD Trace* Negative Small*   URINEPH 7.5* 7.0 6.5   PROTEIN 30* Negative Negative   UROBILINOGEN 0.2  --  0.2   NITRITE Negative Negative Negative   LEUKEST Negative Negative Negative   RBCU O - 2 1 O - 2   WBCU 0 - 5 <1 O - 2     Recent Labs   Lab Test 04/25/22  1829 06/08/21  1652 10/05/20  0823 07/02/20  1546   UTPG 1.43* 0.62* 0.29* 0.58*     PTH  Recent Labs   Lab Test 12/19/22  1024   PTHI 60     IRON STUDIES  No lab results found.    Mariah Renee, NP

## 2024-12-17 NOTE — PROGRESS NOTES
Mariah Renee, Winsome Bravo RN Becky can you let Josy know her labs looked great! I went ahead and sent an Rx for Lasix 20 mg every day prn for edema. She should take this in addition to her other meds if she has edema.  She should continue to monitor her b/ps closely while taking it to ensure it doesn't cause hypotension. She should repeat a renal panel in one month to make sure K and Na are OK.  Thanks  Dominga

## 2025-01-22 DIAGNOSIS — F41.1 GENERALIZED ANXIETY DISORDER: ICD-10-CM

## 2025-01-22 RX ORDER — CITALOPRAM HYDROBROMIDE 20 MG/1
20 TABLET ORAL DAILY
Qty: 90 TABLET | Refills: 3 | Status: SHIPPED | OUTPATIENT
Start: 2025-01-22

## 2025-01-28 ENCOUNTER — PATIENT OUTREACH (OUTPATIENT)
Dept: NEPHROLOGY | Facility: CLINIC | Age: 60
End: 2025-01-28
Payer: COMMERCIAL

## 2025-01-29 DIAGNOSIS — R80.9 ALBUMINURIA: ICD-10-CM

## 2025-01-29 DIAGNOSIS — I10 HYPERTENSION, ESSENTIAL: ICD-10-CM

## 2025-01-30 RX ORDER — LOSARTAN POTASSIUM 50 MG/1
50 TABLET ORAL 2 TIMES DAILY
Qty: 180 TABLET | Refills: 3 | Status: SHIPPED | OUTPATIENT
Start: 2025-01-30

## 2025-01-30 NOTE — TELEPHONE ENCOUNTER
Nephrology Note: Medication Refill Request    Medication Refill Request:     Medication/Dose/Frequency: losartan  Preferred Pharmacy: [facility] [place]  Provider:      Dominga Renee                 SITUATION/BACKROUND:                 Last office visit: 12.16.2024        Future office visit: April 2025     ASSESSMENT:     Neph Assessments:    Recent Labs:  CBC Results:  Recent Labs   Lab Test 05/17/23  1414 04/25/22  1823 07/19/21  1714   WBC  --   --  6.7   RBC  --   --  3.99   HGB 12.0   < > 12.3   HCT  --   --  36.0   MCV  --   --  90   MCH  --   --  30.8   MCHC  --   --  34.2   RDW  --   --  11.8   PLT  --   --  341    < > = values in this interval not displayed.     Last Renal Panel:  Sodium   Date Value Ref Range Status   12/16/2024 136 135 - 145 mmol/L Final   06/08/2021 128 (L) 133 - 144 mmol/L Final     Potassium   Date Value Ref Range Status   12/16/2024 4.3 3.4 - 5.3 mmol/L Final   05/09/2022 4.2 3.4 - 5.3 mmol/L Final   06/08/2021 4.3 3.4 - 5.3 mmol/L Final     Chloride   Date Value Ref Range Status   12/16/2024 101 98 - 107 mmol/L Final   05/09/2022 97 94 - 109 mmol/L Final   06/08/2021 97 94 - 109 mmol/L Final     Carbon Dioxide   Date Value Ref Range Status   06/08/2021 24 20 - 32 mmol/L Final     Carbon Dioxide (CO2)   Date Value Ref Range Status   12/16/2024 24 22 - 29 mmol/L Final   05/09/2022 25 20 - 32 mmol/L Final     Anion Gap   Date Value Ref Range Status   12/16/2024 11 7 - 15 mmol/L Final   05/09/2022 8 3 - 14 mmol/L Final   06/08/2021 7 3 - 14 mmol/L Final     Glucose   Date Value Ref Range Status   12/16/2024 92 70 - 99 mg/dL Final   05/09/2022 102 (H) 70 - 99 mg/dL Final   06/08/2021 82 70 - 99 mg/dL Final     Urea Nitrogen   Date Value Ref Range Status   12/16/2024 18.7 8.0 - 23.0 mg/dL Final   05/09/2022 10 7 - 30 mg/dL Final   06/08/2021 12 7 - 30 mg/dL Final     Creatinine   Date Value Ref Range Status   12/16/2024 0.90 0.51 - 0.95 mg/dL Final   06/08/2021 0.59 0.52 - 1.04 mg/dL Final      GFR Estimate   Date Value Ref Range Status   12/16/2024 73 >60 mL/min/1.73m2 Final     Comment:     eGFR calculated using 2021 CKD-EPI equation.   06/08/2021 >90 >60 mL/min/[1.73_m2] Final     Comment:     Non  GFR Calc  Starting 12/18/2018, serum creatinine based estimated GFR (eGFR) will be   calculated using the Chronic Kidney Disease Epidemiology Collaboration   (CKD-EPI) equation.       Calcium   Date Value Ref Range Status   12/16/2024 9.4 8.8 - 10.4 mg/dL Final     Comment:     Reference intervals for this test were updated on 7/16/2024 to reflect our healthy population more accurately. There may be differences in the flagging of prior results with similar values performed with this method. Those prior results can be interpreted in the context of the updated reference intervals.   06/08/2021 9.1 8.5 - 10.1 mg/dL Final     Phosphorus   Date Value Ref Range Status   12/16/2024 3.6 2.5 - 4.5 mg/dL Final   10/05/2020 3.7 2.5 - 4.5 mg/dL Final     Albumin   Date Value Ref Range Status   12/16/2024 4.6 3.5 - 5.2 g/dL Final   05/09/2022 4.1 3.4 - 5.0 g/dL Final   06/08/2021 4.1 3.4 - 5.0 g/dL Final       Current Medication List:   Current Outpatient Medications (Antihypertensive, Cardiovascular, Diuretics, Beta blockers, Calcium blockers, Anticoagulants)   Medication Sig    furosemide (LASIX) 20 MG tablet Take 1 tablet (20 mg) by mouth daily as needed (for edema).    losartan (COZAAR) 50 MG tablet Take 1 tablet (50 mg) by mouth 2 times daily.    NIFEdipine ER OSMOTIC (PROCARDIA XL) 30 MG 24 hr tablet Take 1 tablet (30 mg) by mouth daily    spironolactone (ALDACTONE) 50 MG tablet Take 1 tablet (50 mg) by mouth daily     Current Outpatient Medications (Other)   Medication Sig    Cholecalciferol (VITAMIN D3 PO) Take 1,000 Units by mouth daily    citalopram (CELEXA) 20 MG tablet TAKE ONE TABLET BY MOUTH ONCE DAILY    EPINEPHrine (ANY BX GENERIC EQUIV) 0.3 MG/0.3ML injection 2-pack Inject 0.3 mLs (0.3  mg) into the muscle as needed for anaphylaxis. May repeat one time in 5-15 minutes if response to initial dose is inadequate.    magnesium 250 MG tablet Take 1 tablet by mouth daily       Has patient had kidney transplant in the prior 1 year: no.   Has patient been seen the last 12 months: Yes.  Associated labs reviewed for medication: Yes    PLAN:     Medication refilled per protocol: Yes    MICHAEL JAMES RN

## 2025-02-03 ENCOUNTER — LAB (OUTPATIENT)
Dept: LAB | Facility: CLINIC | Age: 60
End: 2025-02-03
Payer: COMMERCIAL

## 2025-02-03 DIAGNOSIS — I10 HYPERTENSION, ESSENTIAL: ICD-10-CM

## 2025-02-03 DIAGNOSIS — R80.9 ALBUMINURIA: ICD-10-CM

## 2025-02-03 DIAGNOSIS — E87.1 HYPONATREMIA: ICD-10-CM

## 2025-02-03 LAB
ALBUMIN SERPL BCG-MCNC: 4.6 G/DL (ref 3.5–5.2)
ANION GAP SERPL CALCULATED.3IONS-SCNC: 12 MMOL/L (ref 7–15)
BUN SERPL-MCNC: 19.7 MG/DL (ref 8–23)
CALCIUM SERPL-MCNC: 9.5 MG/DL (ref 8.8–10.4)
CHLORIDE SERPL-SCNC: 95 MMOL/L (ref 98–107)
CREAT SERPL-MCNC: 0.94 MG/DL (ref 0.51–0.95)
CREAT UR-MCNC: 47.4 MG/DL
EGFRCR SERPLBLD CKD-EPI 2021: 70 ML/MIN/1.73M2
GLUCOSE SERPL-MCNC: 117 MG/DL (ref 70–99)
HCO3 SERPL-SCNC: 25 MMOL/L (ref 22–29)
MICROALBUMIN UR-MCNC: 29 MG/L
MICROALBUMIN/CREAT UR: 61.18 MG/G CR (ref 0–25)
PHOSPHATE SERPL-MCNC: 3.7 MG/DL (ref 2.5–4.5)
POTASSIUM SERPL-SCNC: 4.2 MMOL/L (ref 3.4–5.3)
SODIUM SERPL-SCNC: 132 MMOL/L (ref 135–145)
SODIUM UR-SCNC: 47 MMOL/L

## 2025-02-03 PROCEDURE — 83935 ASSAY OF URINE OSMOLALITY: CPT

## 2025-02-03 PROCEDURE — 82570 ASSAY OF URINE CREATININE: CPT

## 2025-02-03 PROCEDURE — 80069 RENAL FUNCTION PANEL: CPT

## 2025-02-03 PROCEDURE — 82043 UR ALBUMIN QUANTITATIVE: CPT

## 2025-02-03 PROCEDURE — 84300 ASSAY OF URINE SODIUM: CPT

## 2025-02-03 PROCEDURE — 36415 COLL VENOUS BLD VENIPUNCTURE: CPT

## 2025-02-04 LAB — OSMOLALITY UR: 325 MMOL/KG (ref 100–1200)

## 2025-02-05 ENCOUNTER — MYC REFILL (OUTPATIENT)
Dept: NEPHROLOGY | Facility: CLINIC | Age: 60
End: 2025-02-05
Payer: COMMERCIAL

## 2025-02-05 DIAGNOSIS — I10 HYPERTENSION, ESSENTIAL: ICD-10-CM

## 2025-02-06 RX ORDER — NIFEDIPINE 30 MG/1
30 TABLET, EXTENDED RELEASE ORAL DAILY
Qty: 90 TABLET | Refills: 3 | Status: SHIPPED | OUTPATIENT
Start: 2025-02-06

## 2025-02-06 NOTE — TELEPHONE ENCOUNTER
Nephrology Note: Medication Refill Request    Medication Refill Request:     Medication/Dose/Frequency: nifedipine  Preferred Pharmacy: [facility] [place]   Provider:   Dominga Renee                        SITUATION/BACKROUND:                 Last office visit: Dec 2024        Future office visit: April 2025     ASSESSMENT:     Neph Assessments:    Recent Labs:  CBC Results:  Recent Labs   Lab Test 05/17/23  1414 04/25/22  1823 07/19/21  1714   WBC  --   --  6.7   RBC  --   --  3.99   HGB 12.0   < > 12.3   HCT  --   --  36.0   MCV  --   --  90   MCH  --   --  30.8   MCHC  --   --  34.2   RDW  --   --  11.8   PLT  --   --  341    < > = values in this interval not displayed.     Last Renal Panel:  Sodium   Date Value Ref Range Status   02/03/2025 132 (L) 135 - 145 mmol/L Final   06/08/2021 128 (L) 133 - 144 mmol/L Final     Potassium   Date Value Ref Range Status   02/03/2025 4.2 3.4 - 5.3 mmol/L Final   05/09/2022 4.2 3.4 - 5.3 mmol/L Final   06/08/2021 4.3 3.4 - 5.3 mmol/L Final     Chloride   Date Value Ref Range Status   02/03/2025 95 (L) 98 - 107 mmol/L Final   05/09/2022 97 94 - 109 mmol/L Final   06/08/2021 97 94 - 109 mmol/L Final     Carbon Dioxide   Date Value Ref Range Status   06/08/2021 24 20 - 32 mmol/L Final     Carbon Dioxide (CO2)   Date Value Ref Range Status   02/03/2025 25 22 - 29 mmol/L Final   05/09/2022 25 20 - 32 mmol/L Final     Anion Gap   Date Value Ref Range Status   02/03/2025 12 7 - 15 mmol/L Final   05/09/2022 8 3 - 14 mmol/L Final   06/08/2021 7 3 - 14 mmol/L Final     Glucose   Date Value Ref Range Status   02/03/2025 117 (H) 70 - 99 mg/dL Final   05/09/2022 102 (H) 70 - 99 mg/dL Final   06/08/2021 82 70 - 99 mg/dL Final     Urea Nitrogen   Date Value Ref Range Status   02/03/2025 19.7 8.0 - 23.0 mg/dL Final   05/09/2022 10 7 - 30 mg/dL Final   06/08/2021 12 7 - 30 mg/dL Final     Creatinine   Date Value Ref Range Status   02/03/2025 0.94 0.51 - 0.95 mg/dL Final   06/08/2021 0.59 0.52 - 1.04  mg/dL Final     GFR Estimate   Date Value Ref Range Status   02/03/2025 70 >60 mL/min/1.73m2 Final     Comment:     eGFR calculated using 2021 CKD-EPI equation.   06/08/2021 >90 >60 mL/min/[1.73_m2] Final     Comment:     Non  GFR Calc  Starting 12/18/2018, serum creatinine based estimated GFR (eGFR) will be   calculated using the Chronic Kidney Disease Epidemiology Collaboration   (CKD-EPI) equation.       Calcium   Date Value Ref Range Status   02/03/2025 9.5 8.8 - 10.4 mg/dL Final   06/08/2021 9.1 8.5 - 10.1 mg/dL Final     Phosphorus   Date Value Ref Range Status   02/03/2025 3.7 2.5 - 4.5 mg/dL Final   10/05/2020 3.7 2.5 - 4.5 mg/dL Final     Albumin   Date Value Ref Range Status   02/03/2025 4.6 3.5 - 5.2 g/dL Final   05/09/2022 4.1 3.4 - 5.0 g/dL Final   06/08/2021 4.1 3.4 - 5.0 g/dL Final       Current Medication List:   Current Outpatient Medications (Antihypertensive, Cardiovascular, Diuretics, Beta blockers, Calcium blockers, Anticoagulants)   Medication Sig    NIFEdipine ER OSMOTIC (PROCARDIA XL) 30 MG 24 hr tablet Take 1 tablet (30 mg) by mouth daily.    furosemide (LASIX) 20 MG tablet Take 1 tablet (20 mg) by mouth daily as needed (for edema).    losartan (COZAAR) 50 MG tablet Take 1 tablet (50 mg) by mouth 2 times daily.    spironolactone (ALDACTONE) 50 MG tablet Take 1 tablet (50 mg) by mouth daily     Current Outpatient Medications (Other)   Medication Sig    Cholecalciferol (VITAMIN D3 PO) Take 1,000 Units by mouth daily    citalopram (CELEXA) 20 MG tablet TAKE ONE TABLET BY MOUTH ONCE DAILY    EPINEPHrine (ANY BX GENERIC EQUIV) 0.3 MG/0.3ML injection 2-pack Inject 0.3 mLs (0.3 mg) into the muscle as needed for anaphylaxis. May repeat one time in 5-15 minutes if response to initial dose is inadequate.    magnesium 250 MG tablet Take 1 tablet by mouth daily       Has patient had kidney transplant in the prior 1 year: no.   Has patient been seen the last 12 months: Yes.  Associated  labs reviewed for medication: N/A    PLAN:     Medication refilled per protocol: Yes    MICHAEL JAMES RN

## 2025-04-14 ENCOUNTER — LAB (OUTPATIENT)
Dept: LAB | Facility: CLINIC | Age: 60
End: 2025-04-14
Payer: COMMERCIAL

## 2025-04-14 DIAGNOSIS — R80.9 ALBUMINURIA: ICD-10-CM

## 2025-04-14 DIAGNOSIS — E87.1 HYPONATREMIA: ICD-10-CM

## 2025-04-14 DIAGNOSIS — I10 HYPERTENSION, ESSENTIAL: ICD-10-CM

## 2025-04-14 LAB
ALBUMIN SERPL BCG-MCNC: 4.8 G/DL (ref 3.5–5.2)
ANION GAP SERPL CALCULATED.3IONS-SCNC: 10 MMOL/L (ref 7–15)
BUN SERPL-MCNC: 19.1 MG/DL (ref 8–23)
CALCIUM SERPL-MCNC: 9.6 MG/DL (ref 8.8–10.4)
CHLORIDE SERPL-SCNC: 92 MMOL/L (ref 98–107)
CREAT SERPL-MCNC: 0.92 MG/DL (ref 0.51–0.95)
EGFRCR SERPLBLD CKD-EPI 2021: 71 ML/MIN/1.73M2
GLUCOSE SERPL-MCNC: 91 MG/DL (ref 70–99)
HCO3 SERPL-SCNC: 25 MMOL/L (ref 22–29)
OSMOLALITY UR: 269 MMOL/KG (ref 100–1200)
PHOSPHATE SERPL-MCNC: 4.4 MG/DL (ref 2.5–4.5)
POTASSIUM SERPL-SCNC: 4.8 MMOL/L (ref 3.4–5.3)
SODIUM SERPL-SCNC: 127 MMOL/L (ref 135–145)

## 2025-04-14 PROCEDURE — 36415 COLL VENOUS BLD VENIPUNCTURE: CPT

## 2025-04-14 PROCEDURE — 80069 RENAL FUNCTION PANEL: CPT

## 2025-04-14 PROCEDURE — 82570 ASSAY OF URINE CREATININE: CPT

## 2025-04-14 PROCEDURE — 84300 ASSAY OF URINE SODIUM: CPT

## 2025-04-14 PROCEDURE — 82043 UR ALBUMIN QUANTITATIVE: CPT

## 2025-04-14 PROCEDURE — 83935 ASSAY OF URINE OSMOLALITY: CPT

## 2025-04-15 LAB
CREAT UR-MCNC: 27.5 MG/DL
MICROALBUMIN UR-MCNC: 23.4 MG/L
MICROALBUMIN/CREAT UR: 85.09 MG/G CR (ref 0–25)
SODIUM UR-SCNC: 51 MMOL/L

## 2025-04-17 ENCOUNTER — MYC MEDICAL ADVICE (OUTPATIENT)
Dept: NEPHROLOGY | Facility: CLINIC | Age: 60
End: 2025-04-17
Payer: COMMERCIAL

## 2025-04-18 ENCOUNTER — VIRTUAL VISIT (OUTPATIENT)
Dept: NEPHROLOGY | Facility: CLINIC | Age: 60
End: 2025-04-18
Payer: COMMERCIAL

## 2025-04-18 DIAGNOSIS — I10 HYPERTENSION, ESSENTIAL: Primary | ICD-10-CM

## 2025-04-18 NOTE — LETTER
"4/18/2025       RE: Josy John  [address] [place] [address]     Dear Colleague,    Thank you for referring your patient, Josy John, to the [facility]OLOG[facility] [place] at [facility]. Please see a copy of my visit note below.    Virtual Visit Details    Type of service:  Video Visit   Start 1430  End 1450    Originating Location (pt. Location): Home    Distant Location (provider location):  On-site  Platform used for Video Visit: [facility]      Nephrology Clinic Visit 4/18/25    Assessment and Plan:    HTN - Borderline controlled w/resolution of edema since discontinuing Nifedipine. Home b/ps reported as 130's/80. No current weight reported but was 184# ( 12/24) from 188# previous visit. Josy has not been tolerant of CCB or BB due to side effects so options limited to second line of therapy. She would like to continue to work on weight loss at this time which seems reasonable.     - Current regimen:      Losartan 50 mg bid     Spironolactone 50 mg every day       - Continue current regimen       - Contact me for elevated b/ps so we can meet sooner and make adjustments    2. Renal function/albuminuria - Creat 0.9, UACR improved to 61 mg/gCr ( 2/25) from 97 mg/gCr from 117.3 mg/gCr with improvement in blood pressures but now back up to 85 mg/gCr with use of NSAIDs and borderline b/ps.      Etiology for her albuminuria per Dr Freire \"  I suspect she sustained some renal injury at an early age as a result of recurrent UTI/pyelonephritis form urinary reflux (that has since been corrected with ureteral reimplantation).  As a result, she likely has some mild scarring resulting in albuminuria that has remained well controlled when on RAAS blockade and BP under decent control\"   - Will continue Losartan and Spironolactone   - Limit NSAID use   - Next option for treating her albuminuria would be SGLT2i but unclear if insurance would cover    3. " Chronic hyponatremia - Na 127 on 4/14/25 in setting of pain/po oral intake/diuretics/nausea. Usually in the low 130's. No edema per patient report.   Dental pain is improving as is her po intake.  Has been on Citalopram for years and on Spironolactone since 2022.  Marcelo and Uosm are normal   - Recheck renal panel 1 month    4. Electrolytes - K 4.8 on ARB/MRA   - Continue to monitor    5. Disposition - RTC 6 months for follow up w/labs prior    Assessment and plan was discussed with patient and she voiced her understanding and agreement.    Reason for Visit:  HTN/albuminuria/hyponatremia    HPI:  Ms John is a 59 yo female with HTN, Albuminuria, chronic hyponatremia present today for HTN follow up.   Last seen in clinic by me 12/16/24.   Furosemide 20 mg every day prn for edema started. Has not been using since she stopped the Nifedipine  Baseline creat 0.8    ROS:   Josy reports being off Nifedipine due to side effects ( edema/bloating). Edema has resolved.   Home b/ps 130's/80  Has been using NSAIDs due to dental pain. Was using Ibuprofen 400 mg several times per day, now tapering off. With the dental pain was unable to take po intake normally ,was nauseated and in pain   Had current labs drawn during her dental pain episode   Denies CP/dyspnea  Has been on Citalopram for years  Was started on Spironolactone around 5/22 primarily to address her albuminuria   Exercising regularly and continues to have good exercise endurance off beta blockade  She is post menopausal.   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  Social History     Tobacco Use     Smoking status: Never     Smokeless tobacco: Never   Substance Use Topics     Alcohol use: Yes     Comment: rare       Allergies:  Allergies   Allergen Reactions     Zoloft      extreme fatigue and nausea       Medications:  Current Outpatient Medications   Medication Sig Dispense Refill     Cholecalciferol (VITAMIN D3 PO) Take 1,000 Units by mouth daily       citalopram (CELEXA) 20 MG tablet TAKE ONE TABLET BY MOUTH ONCE DAILY 90 tablet 3     EPINEPHrine (ANY BX GENERIC EQUIV) 0.3 MG/0.3ML injection 2-pack Inject 0.3 mLs (0.3 mg) into the muscle as needed for anaphylaxis. May repeat one time in 5-15 minutes if response to initial dose is inadequate. 2 each 1     losartan (COZAAR) 50 MG tablet Take 1 tablet (50 mg) by mouth 2 times daily. 180 tablet 3     magnesium 250 MG tablet Take 1 tablet by mouth daily       spironolactone (ALDACTONE) 50 MG tablet Take 1 tablet (50 mg) by mouth daily 90 tablet 3     No current facility-administered medications for this visit.      Vitals:  There were no vitals taken for this visit.    Exam:  GEN: Pleasant female in NAD  LUNGS: Breathing is non labored  NEURO A/O    LABS:   CMP  Recent Labs   Lab Test 04/14/25  1417 02/03/25  1449 12/16/24  1122 10/16/24  1421 07/19/21  1713 06/08/21  1652 10/05/20  0822 07/02/20  1545 02/29/20  0910   * 132* 136 124*   < > 128* 131* 131* 130*   POTASSIUM 4.8 4.2 4.3 4.9   < > 4.3 4.2 3.9 4.5   CHLORIDE 92* 95* 101 90*   < > 97 99 99 98   CO2 25 25 24 23   < > 24 26 29 26   ANIONGAP 10 12 11 11   < > 7 6 3 6   GLC 91 117* 92 93   < > 82 80 86 98   BUN 19.1 19.7 18.7 17.0   < > 12 14 14 14   CR 0.92 0.94 0.90 0.84   < > 0.59 0.76 0.68 0.58   GFRESTIMATED 71 70 73 80   < > >90 88 >90 >90   GFRESTBLACK  --   --   --   --   --  >90 >90 >90 >90   KRIS 9.6 9.5 9.4 9.6   < > 9.1 9.3 9.1 9.1    < > = values in this interval not  displayed.     Recent Labs   Lab Test 03/27/23  0935 07/19/21  1713 06/08/21  1652 02/29/20  0910   BILITOTAL 0.5 0.3 0.3 0.4   ALKPHOS 80 93 96 87   ALT 18 24 28 32   AST 31 23 23 25     CBC  Recent Labs   Lab Test 05/17/23  1414 12/19/22  1024 04/25/22  1823 07/19/21  1714 06/08/21  1652 10/05/20  0822 02/29/20  0910   HGB 12.0 12.1 13.1 12.3 12.4 13.3 13.7   WBC  --   --   --  6.7 8.0 5.5 5.8   RBC  --   --   --  3.99 3.96 4.26 4.43   HCT  --   --   --  36.0 36.5 39.3 40.3   MCV  --   --   --  90 92 92 91   MCH  --   --   --  30.8 31.3 31.2 30.9   MCHC  --   --   --  34.2 34.0 33.8 34.0   RDW  --   --   --  11.8 11.6 12.1 12.1   PLT  --   --   --  341 336 316 328     URINE STUDIES  Recent Labs   Lab Test 10/05/20  0823 12/27/18  1433 12/22/17  1521   COLOR Yellow Straw Yellow   APPEARANCE Clear Clear Clear   URINEGLC Negative Negative Negative   URINEBILI Negative Negative Negative   URINEKETONE Negative Negative 15*   SG 1.020 1.008 1.015   UBLD Trace* Negative Small*   URINEPH 7.5* 7.0 6.5   PROTEIN 30* Negative Negative   UROBILINOGEN 0.2  --  0.2   NITRITE Negative Negative Negative   LEUKEST Negative Negative Negative   RBCU O - 2 1 O - 2   WBCU 0 - 5 <1 O - 2     Recent Labs   Lab Test 04/25/22  1829 06/08/21  1652 10/05/20  0823 07/02/20  1546   UTPG 1.43* 0.62* 0.29* 0.58*     PTH  Recent Labs   Lab Test 12/19/22  1024   PTHI 60     IRON STUDIES  No lab results found.    Mariah Renee, NP        Again, thank you for allowing me to participate in the care of your patient.      Sincerely,    Mariah Renee, NP

## 2025-04-18 NOTE — NURSING NOTE
Current patient location: [address] [place] [address] [address] [place] [address]    Is the patient currently in the state of [place]? YES    Visit mode: VIDEO    If the visit is dropped, the patient can be reconnected by:VIDEO VISIT: Text to cell phone:   Telephone Information:   Mobile [phone]       Will anyone else be joining the visit? NO  (If patient encounters technical issues they should call [phone])    Are changes needed to the allergy or medication list? No    Are refills needed on medications prescribed by this physician? NO    Rooming Documentation:  Questionnaire(s) completed    Reason for visit: RECHECK    Toya FLOWERSF

## 2025-04-18 NOTE — PATIENT INSTRUCTIONS
Discontinue Lasix  Repeat renal panel one month  Continue to work on b/p control with exercise and dietary mod  Return to clinic if b/ps are elevated ( SBP > 140/ for b/p med adjustment)

## 2025-04-18 NOTE — PROGRESS NOTES
Virtual Visit Details    Type of service:  Video Visit   Start 1430  End 1450    Originating Location (pt. Location): Home    Distant Location (provider location):  On-site  Platform used for Video Visit: Yamil

## 2025-04-19 NOTE — PROGRESS NOTES
"Nephrology Clinic Visit 4/18/25    Assessment and Plan:    HTN - Borderline controlled w/resolution of edema since discontinuing Nifedipine. Home b/ps reported as 130's/80. No current weight reported but was 184# ( 12/24) from 188# previous visit. Josy has not been tolerant of CCB or BB due to side effects so options limited to second line of therapy. She would like to continue to work on weight loss at this time which seems reasonable.     - Current regimen:      Losartan 50 mg bid     Spironolactone 50 mg every day       - Continue current regimen       - Contact me for elevated b/ps so we can meet sooner and make adjustments    2. Renal function/albuminuria - Creat 0.9, UACR improved to 61 mg/gCr ( 2/25) from 97 mg/gCr from 117.3 mg/gCr with improvement in blood pressures but now back up to 85 mg/gCr with use of NSAIDs and borderline b/ps.      Etiology for her albuminuria per Dr Freire \"  I suspect she sustained some renal injury at an early age as a result of recurrent UTI/pyelonephritis form urinary reflux (that has since been corrected with ureteral reimplantation).  As a result, she likely has some mild scarring resulting in albuminuria that has remained well controlled when on RAAS blockade and BP under decent control\"   - Will continue Losartan and Spironolactone   - Limit NSAID use   - Next option for treating her albuminuria would be SGLT2i but unclear if insurance would cover    3. Chronic hyponatremia - Na 127 on 4/14/25 in setting of pain/po oral intake/diuretics/nausea. Usually in the low 130's. No edema per patient report.   Dental pain is improving as is her po intake.  Has been on Citalopram for years and on Spironolactone since 2022.  Marcelo and Uosm are normal   - Recheck renal panel 1 month    4. Electrolytes - K 4.8 on ARB/MRA   - Continue to monitor    5. Disposition - RTC 6 months for follow up w/labs prior    Assessment and plan was discussed with patient and she voiced her understanding " and agreement.    Reason for Visit:  HTN/albuminuria/hyponatremia    HPI:  Ms John is a 59 yo female with HTN, Albuminuria, chronic hyponatremia present today for HTN follow up.   Last seen in clinic by me 12/16/24.   Furosemide 20 mg every day prn for edema started. Has not been using since she stopped the Nifedipine  Baseline creat 0.8    ROS:   Josy reports being off Nifedipine due to side effects ( edema/bloating). Edema has resolved.   Home b/ps 130's/80  Has been using NSAIDs due to dental pain. Was using Ibuprofen 400 mg several times per day, now tapering off. With the dental pain was unable to take po intake normally ,was nauseated and in pain   Had current labs drawn during her dental pain episode   Denies CP/dyspnea  Has been on Citalopram for years  Was started on Spironolactone around 5/22 primarily to address her albuminuria   Exercising regularly and continues to have good exercise endurance off beta blockade  She is post menopausal.   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  Social History     Tobacco Use    Smoking status: Never    Smokeless tobacco: Never   Substance Use Topics    Alcohol use: Yes     Comment: rare       Allergies:  Allergies   Allergen Reactions    Zoloft      extreme fatigue and nausea       Medications:  Current Outpatient Medications   Medication Sig Dispense Refill    Cholecalciferol (VITAMIN D3 PO) Take 1,000 Units by mouth daily      citalopram (CELEXA) 20 MG tablet TAKE ONE TABLET BY  MOUTH ONCE DAILY 90 tablet 3    EPINEPHrine (ANY BX GENERIC EQUIV) 0.3 MG/0.3ML injection 2-pack Inject 0.3 mLs (0.3 mg) into the muscle as needed for anaphylaxis. May repeat one time in 5-15 minutes if response to initial dose is inadequate. 2 each 1    losartan (COZAAR) 50 MG tablet Take 1 tablet (50 mg) by mouth 2 times daily. 180 tablet 3    magnesium 250 MG tablet Take 1 tablet by mouth daily      spironolactone (ALDACTONE) 50 MG tablet Take 1 tablet (50 mg) by mouth daily 90 tablet 3     No current facility-administered medications for this visit.      Vitals:  There were no vitals taken for this visit.    Exam:  GEN: Pleasant female in NAD  LUNGS: Breathing is non labored  NEURO A/O    LABS:   CMP  Recent Labs   Lab Test 04/14/25  1417 02/03/25  1449 12/16/24  1122 10/16/24  1421 07/19/21  1713 06/08/21  1652 10/05/20  0822 07/02/20  1545 02/29/20  0910   * 132* 136 124*   < > 128* 131* 131* 130*   POTASSIUM 4.8 4.2 4.3 4.9   < > 4.3 4.2 3.9 4.5   CHLORIDE 92* 95* 101 90*   < > 97 99 99 98   CO2 25 25 24 23   < > 24 26 29 26   ANIONGAP 10 12 11 11   < > 7 6 3 6   GLC 91 117* 92 93   < > 82 80 86 98   BUN 19.1 19.7 18.7 17.0   < > 12 14 14 14   CR 0.92 0.94 0.90 0.84   < > 0.59 0.76 0.68 0.58   GFRESTIMATED 71 70 73 80   < > >90 88 >90 >90   GFRESTBLACK  --   --   --   --   --  >90 >90 >90 >90   KRIS 9.6 9.5 9.4 9.6   < > 9.1 9.3 9.1 9.1    < > = values in this interval not displayed.     Recent Labs   Lab Test 03/27/23  0935 07/19/21  1713 06/08/21  1652 02/29/20  0910   BILITOTAL 0.5 0.3 0.3 0.4   ALKPHOS 80 93 96 87   ALT 18 24 28 32   AST 31 23 23 25     CBC  Recent Labs   Lab Test 05/17/23  1414 12/19/22  1024 04/25/22  1823 07/19/21  1714 06/08/21  1652 10/05/20  0822 02/29/20  0910   HGB 12.0 12.1 13.1 12.3 12.4 13.3 13.7   WBC  --   --   --  6.7 8.0 5.5 5.8   RBC  --   --   --  3.99 3.96 4.26 4.43   HCT  --   --   --  36.0 36.5 39.3 40.3   MCV  --   --   --  90 92 92 91   MCH  --   --   --  30.8  31.3 31.2 30.9   MCHC  --   --   --  34.2 34.0 33.8 34.0   RDW  --   --   --  11.8 11.6 12.1 12.1   PLT  --   --   --  341 336 316 328     URINE STUDIES  Recent Labs   Lab Test 10/05/20  0823 12/27/18  1433 12/22/17  1521   COLOR Yellow Straw Yellow   APPEARANCE Clear Clear Clear   URINEGLC Negative Negative Negative   URINEBILI Negative Negative Negative   URINEKETONE Negative Negative 15*   SG 1.020 1.008 1.015   UBLD Trace* Negative Small*   URINEPH 7.5* 7.0 6.5   PROTEIN 30* Negative Negative   UROBILINOGEN 0.2  --  0.2   NITRITE Negative Negative Negative   LEUKEST Negative Negative Negative   RBCU O - 2 1 O - 2   WBCU 0 - 5 <1 O - 2     Recent Labs   Lab Test 04/25/22  1829 06/08/21  1652 10/05/20  0823 07/02/20  1546   UTPG 1.43* 0.62* 0.29* 0.58*     PTH  Recent Labs   Lab Test 12/19/22  1024   PTHI 60     IRON STUDIES  No lab results found.    Mariah Renee, NP

## 2025-04-28 ENCOUNTER — OFFICE VISIT (OUTPATIENT)
Dept: PEDIATRICS | Facility: CLINIC | Age: 60
End: 2025-04-28
Payer: COMMERCIAL

## 2025-04-28 VITALS
SYSTOLIC BLOOD PRESSURE: 121 MMHG | OXYGEN SATURATION: 97 % | TEMPERATURE: 97.5 F | RESPIRATION RATE: 20 BRPM | WEIGHT: 183.4 LBS | BODY MASS INDEX: 27.16 KG/M2 | HEIGHT: 69 IN | DIASTOLIC BLOOD PRESSURE: 83 MMHG | HEART RATE: 67 BPM

## 2025-04-28 DIAGNOSIS — Z12.31 VISIT FOR SCREENING MAMMOGRAM: ICD-10-CM

## 2025-04-28 DIAGNOSIS — N18.1 CHRONIC KIDNEY DISEASE, STAGE 1: ICD-10-CM

## 2025-04-28 DIAGNOSIS — F41.1 GENERALIZED ANXIETY DISORDER: ICD-10-CM

## 2025-04-28 DIAGNOSIS — T63.441A ALLERGIC REACTION TO BEE STING: ICD-10-CM

## 2025-04-28 DIAGNOSIS — Z83.49 FAMILY HISTORY OF HYPOTHYROIDISM: ICD-10-CM

## 2025-04-28 DIAGNOSIS — R80.9 MICROALBUMINURIA: ICD-10-CM

## 2025-04-28 DIAGNOSIS — Z12.4 CERVICAL CANCER SCREENING: ICD-10-CM

## 2025-04-28 DIAGNOSIS — I10 HYPERTENSION, ESSENTIAL: ICD-10-CM

## 2025-04-28 DIAGNOSIS — I10 BENIGN HYPERTENSION: ICD-10-CM

## 2025-04-28 DIAGNOSIS — R80.9 ALBUMINURIA: ICD-10-CM

## 2025-04-28 DIAGNOSIS — Z12.11 SPECIAL SCREENING FOR MALIGNANT NEOPLASMS, COLON: ICD-10-CM

## 2025-04-28 DIAGNOSIS — Z00.00 ROUTINE GENERAL MEDICAL EXAMINATION AT A HEALTH CARE FACILITY: Primary | ICD-10-CM

## 2025-04-28 PROCEDURE — G2211 COMPLEX E/M VISIT ADD ON: HCPCS | Performed by: PEDIATRICS

## 2025-04-28 PROCEDURE — 3074F SYST BP LT 130 MM HG: CPT | Performed by: PEDIATRICS

## 2025-04-28 PROCEDURE — 99396 PREV VISIT EST AGE 40-64: CPT | Performed by: PEDIATRICS

## 2025-04-28 PROCEDURE — 3079F DIAST BP 80-89 MM HG: CPT | Performed by: PEDIATRICS

## 2025-04-28 PROCEDURE — 99213 OFFICE O/P EST LOW 20 MIN: CPT | Mod: 25 | Performed by: PEDIATRICS

## 2025-04-28 RX ORDER — CITALOPRAM HYDROBROMIDE 20 MG/1
20 TABLET ORAL DAILY
Qty: 90 TABLET | Refills: 3 | Status: SHIPPED | OUTPATIENT
Start: 2025-04-28

## 2025-04-28 RX ORDER — EPINEPHRINE 0.3 MG/.3ML
0.3 INJECTION SUBCUTANEOUS PRN
Qty: 2 EACH | Refills: 1 | Status: SHIPPED | OUTPATIENT
Start: 2025-04-28

## 2025-04-28 RX ORDER — SPIRONOLACTONE 50 MG/1
50 TABLET, FILM COATED ORAL DAILY
Qty: 90 TABLET | Refills: 3 | Status: SHIPPED | OUTPATIENT
Start: 2025-04-28

## 2025-04-28 SDOH — HEALTH STABILITY: PHYSICAL HEALTH: ON AVERAGE, HOW MANY DAYS PER WEEK DO YOU ENGAGE IN MODERATE TO STRENUOUS EXERCISE (LIKE A BRISK WALK)?: 5 DAYS

## 2025-04-28 SDOH — HEALTH STABILITY: PHYSICAL HEALTH: ON AVERAGE, HOW MANY MINUTES DO YOU ENGAGE IN EXERCISE AT THIS LEVEL?: 30 MIN

## 2025-04-28 ASSESSMENT — SOCIAL DETERMINANTS OF HEALTH (SDOH): HOW OFTEN DO YOU GET TOGETHER WITH FRIENDS OR RELATIVES?: ONCE A WEEK

## 2025-04-28 NOTE — PROGRESS NOTES
"Preventive Care Visit  [facility] JULIET Clark MD, Internal Medicine - Pediatrics  Apr 28, 2025      Assessment & Plan       ICD-10-CM    1. Routine general medical examination at a health care facility  Z00.00 PRIMARY CARE FOLLOW-UP SCHEDULING     Colonoscopy Screening  Referral     Lipid panel reflex to direct LDL Fasting     Comprehensive metabolic panel (BMP + Alb, Alk Phos, ALT, AST, Total. Bili, TP)     TSH with free T4 reflex     CBC with platelets      2. Benign hypertension  I10 Well controlled, continue current medications  Follows with nephrology      3. Microalbuminuria  R80.9 On ARB, following with nephrology      4. Generalized anxiety disorder  F41.1 citalopram (CELEXA) 20 MG tablet  Well controlled, continue current medications        5. Chronic kidney disease, stage 1  N18.1       6. Family history of hypothyroidism  Z83.49 Repeat labs      7. Cervical cancer screening  Z12.4 Gets through [facility] - last pap in 2023       8. Special screening for malignant neoplasms, colon  Z12.11 Colonoscopy Screening  Referral      9. Visit for screening mammogram  Z12.31 MA Screen Bilateral w/Heraclio      10. Allergic reaction to bee sting  T63.441A EPINEPHrine (ANY BX GENERIC EQUIV) 0.3 MG/0.3ML injection 2-pack            The longitudinal plan of care for the diagnosis(es)/condition(s) as documented were addressed during this visit. Due to the added complexity in care, I will continue to support Josy in the subsequent management and with ongoing continuity of care.     BMI  Estimated body mass index is 27.08 kg/m  as calculated from the following:    Height as of this encounter: 1.753 m (5' 9\").    Weight as of this encounter: 83.2 kg (183 lb 6.4 oz).   Weight management plan: Discussed healthy diet and exercise guidelines    Counseling  Appropriate preventive services were addressed with this patient via screening, questionnaire, or discussion as " appropriate        Subjective   Josy is a 60 year old, presenting for the following:  Physical        4/28/2025     2:21 PM   Additional Questions   Roomed by miss   Accompanied by self         4/28/2025     2:21 PM   Patient Reported Additional Medications   Patient reports taking the following new medications no          HPI    Advance Care Planning    Patient states has Health Care Directive and will send to [facility].        4/28/2025   General Health   How would you rate your overall physical health? Good   Feel stress (tense, anxious, or unable to sleep) Not at all         4/28/2025   Nutrition   Three or more servings of calcium each day? Yes   Diet: Low salt   How many servings of fruit and vegetables per day? 4 or more   How many sweetened beverages each day? 0-1         4/28/2025   Exercise   Days per week of moderate/strenous exercise 5 days   Average minutes spent exercising at this level 30 min         4/28/2025   Social Factors   Frequency of gathering with friends or relatives Once a week   Worry food won't last until get money to buy more No   Food not last or not have enough money for food? No   Do you have housing? (Housing is defined as stable permanent housing and does not include staying outside in a car, in a tent, in an abandoned building, in an overnight shelter, or couch-surfing.) Yes   Are you worried about losing your housing? No   Lack of transportation? No   Unable to get utilities (heat,electricity)? No         4/28/2025   Fall Risk   Fallen 2 or more times in the past year? No   Trouble with walking or balance? No          4/28/2025   Dental   Dentist two times every year? Yes         Today's PHQ-2 Score:       4/28/2025     2:21 PM   PHQ-2 ( 1999 [employer])   Q1: Little interest or pleasure in doing things 0   Q2: Feeling down, depressed or hopeless 0   PHQ-2 Score 0    Q1: Little interest or pleasure in doing things Not at all   Q2: Feeling down, depressed or hopeless Not at all    PHQ-2 Score 0       Patient-reported           4/28/2025   Substance Use   Alcohol more than 3/day or more than 7/wk No   Do you use any other substances recreationally? No     Social History     Tobacco Use    Smoking status: Never    Smokeless tobacco: Never   Vaping Use    Vaping status: Never Used   Substance Use Topics    Alcohol use: Yes     Comment: rare    Drug use: No         5/14/2024   LAST FHS-7 RESULTS   1st degree relative breast or ovarian cancer No     Mammogram Screening - Mammogram every 1-2 years updated in Health Maintenance based on mutual decision making        4/28/2025   STI Screening   New sexual partner(s) since last STI/HIV test? No     History of abnormal Pap smear: No - age 30-64 HPV with reflex Pap every 5 years recommended        Latest Ref Rng & Units 7/2/2020     3:58 PM 7/2/2020     3:26 PM 5/14/2009    12:00 AM   PAP / HPV   PAP (Historical)   NIL  NIL    HPV 16 DNA NEG^Negative Negative      HPV 18 DNA NEG^Negative Negative      Other HR HPV NEG^Negative Negative        ASCVD Risk   The 10-year ASCVD risk score (Jojo [place], et al., 2019) is: 4.1%    Values used to calculate the score:      Age: 60 years      Sex: Female      Is Non- : No      Diabetic: No      Tobacco smoker: No      Systolic Blood Pressure: 121 mmHg      Is BP treated: Yes      HDL Cholesterol: 62 mg/dL      Total Cholesterol: 235 mg/dL        Reviewed and updated as needed this visit by Provider                      HTN - has tried multiple medications to treat this under the care of her nephrology team.  Side effects from many medications.  Currently on losartan and spironolactone.      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    Getting walks in 5-7 days per week - can walk many miles    Seeing derm intermittently - no new  "concerns    Pap smear at [facility] in 2023       ROS: 10 point ROS neg other than the symptoms noted above in the HPI.       Objective    Exam  /83 (BP Location: Right arm, Patient Position: Sitting, Cuff Size: Adult Regular)   Pulse 67   Temp 97.5  F (36.4  C) (Temporal)   Resp 20   Ht 1.753 m (5' 9\")   Wt 83.2 kg (183 lb 6.4 oz)   SpO2 97%   BMI 27.08 kg/m     Estimated body mass index is 27.08 kg/m  as calculated from the following:    Height as of this encounter: 1.753 m (5' 9\").    Weight as of this encounter: 83.2 kg (183 lb 6.4 oz).  Wt Readings from Last 4 Encounters:   04/28/25 83.2 kg (183 lb 6.4 oz)   12/16/24 83.5 kg (184 lb)   10/23/24 85.3 kg (188 lb)   04/24/23 84.4 kg (186 lb)         Physical Exam  GENERAL: alert and no distress  EYES: Eyes grossly normal to inspection, PERRL and conjunctivae and sclerae normal  HENT: ear canals and TM's normal, nose and mouth without ulcers or lesions  NECK: no adenopathy, no asymmetry, masses, or scars  RESP: lungs clear to auscultation - no rales, rhonchi or wheezes  CV: regular rate and rhythm, normal S1 S2, no S3 or S4, no murmur, click or rub, no peripheral edema  ABDOMEN: soft, nontender, no hepatosplenomegaly, no masses and bowel sounds normal  MS: no gross musculoskeletal defects noted, no edema  SKIN: no suspicious lesions or rashes  NEURO: Normal strength and tone, mentation intact and speech normal  PSYCH: mentation appears normal, affect normal/bright        Signed Electronically by: Pamela Clark MD    "

## 2025-04-28 NOTE — PATIENT INSTRUCTIONS
Make an appointment in the pharmacy for Shingles and Pneumonia vaccines    We'll help you schedule a fasting lab visit    Expect a call to schedule your colonoscopy    Keep meds the same    Mammogram - can get after 5/14          Patient Education   Preventive Care Advice   This is general advice given by our system to help you stay healthy. However, your care team may have specific advice just for you. Please talk to your care team about your preventive care needs.  Nutrition  Eat 5 or more servings of fruits and vegetables each day.  Try wheat bread, brown rice and whole grain pasta (instead of white bread, rice, and pasta).  Get enough calcium and vitamin D. Check the label on foods and aim for 100% of the RDA (recommended daily allowance).  Lifestyle  Exercise at least 150 minutes each week  (30 minutes a day, 5 days a week).  Do muscle strengthening activities 2 days a week. These help control your weight and prevent disease.  No smoking.  Wear sunscreen to prevent skin cancer.  Have a dental exam and cleaning every 6 months.  Yearly exams  See your health care team every year to talk about:  Any changes in your health.  Any medicines your care team has prescribed.  Preventive care, family planning, and ways to prevent chronic diseases.  Shots (vaccines)   HPV shots (up to age 26), if you've never had them before.  Hepatitis B shots (up to age 59), if you've never had them before.  COVID-19 shot: Get this shot when it's due.  Flu shot: Get a flu shot every year.  Tetanus shot: Get a tetanus shot every 10 years.  Pneumococcal, hepatitis A, and RSV shots: Ask your care team if you need these based on your risk.  Shingles shot (for age 50 and up)  General health tests  Diabetes screening:  Starting at age 35, Get screened for diabetes at least every 3 years.  If you are younger than age 35, ask your care team if you should be screened for diabetes.  Cholesterol test: At age 39, start having a cholesterol test  every 5 years, or more often if advised.  Bone density scan (DEXA): At age 50, ask your care team if you should have this scan for osteoporosis (brittle bones).  Hepatitis C: Get tested at least once in your life.  STIs (sexually transmitted infections)  Before age 24: Ask your care team if you should be screened for STIs.  After age 24: Get screened for STIs if you're at risk. You are at risk for STIs (including HIV) if:  You are sexually active with more than one person.  You don't use condoms every time.  You or a partner was diagnosed with a sexually transmitted infection.  If you are at risk for HIV, ask about PrEP medicine to prevent HIV.  Get tested for HIV at least once in your life, whether you are at risk for HIV or not.  Cancer screening tests  Cervical cancer screening: If you have a cervix, begin getting regular cervical cancer screening tests starting at age 21.  Breast cancer scan (mammogram): If you've ever had breasts, begin having regular mammograms starting at age 40. This is a scan to check for breast cancer.  Colon cancer screening: It is important to start screening for colon cancer at age 45.  Have a colonoscopy test every 10 years (or more often if you're at risk) Or, ask your provider about stool tests like a FIT test every year or Cologuard test every 3 years.  To learn more about your testing options, visit:   .  For help making a decision, visit:   https://bit.ly/xb92643.  Prostate cancer screening test: If you have a prostate, ask your care team if a prostate cancer screening test (PSA) at age 55 is right for you.  Lung cancer screening: If you are a current or former smoker ages 50 to 80, ask your care team if ongoing lung cancer screenings are right for you.  For informational purposes only. Not to replace the advice of your health care provider. Copyright   2023 [employer]. All rights reserved. Clinically reviewed by the [facility] Transitions Program. [employer]  060492 - REV 01/24.

## 2025-04-28 NOTE — TELEPHONE ENCOUNTER
Nephrology Note: Medication Refill Request    Medication Refill Request:     Medication/Dose/Frequency: spironolactone  [facility]  Provider:  Dominga Renee                         SITUATION/BACKROUND:                 Last office visit: April 2025        Future office visit: 10/22/2025     ASSESSMENT:     Neph Assessments:    Recent Labs:  CBC Results:  Recent Labs   Lab Test 05/17/23  1414 04/25/22  1823 07/19/21  1714   WBC  --   --  6.7   RBC  --   --  3.99   HGB 12.0   < > 12.3   HCT  --   --  36.0   MCV  --   --  90   MCH  --   --  30.8   MCHC  --   --  34.2   RDW  --   --  11.8   PLT  --   --  341    < > = values in this interval not displayed.     Last Renal Panel:  Sodium   Date Value Ref Range Status   04/14/2025 127 (L) 135 - 145 mmol/L Final   06/08/2021 128 (L) 133 - 144 mmol/L Final     Potassium   Date Value Ref Range Status   04/14/2025 4.8 3.4 - 5.3 mmol/L Final   05/09/2022 4.2 3.4 - 5.3 mmol/L Final   06/08/2021 4.3 3.4 - 5.3 mmol/L Final     Chloride   Date Value Ref Range Status   04/14/2025 92 (L) 98 - 107 mmol/L Final   05/09/2022 97 94 - 109 mmol/L Final   06/08/2021 97 94 - 109 mmol/L Final     Carbon Dioxide   Date Value Ref Range Status   06/08/2021 24 20 - 32 mmol/L Final     Carbon Dioxide (CO2)   Date Value Ref Range Status   04/14/2025 25 22 - 29 mmol/L Final   05/09/2022 25 20 - 32 mmol/L Final     Anion Gap   Date Value Ref Range Status   04/14/2025 10 7 - 15 mmol/L Final   05/09/2022 8 3 - 14 mmol/L Final   06/08/2021 7 3 - 14 mmol/L Final     Glucose   Date Value Ref Range Status   04/14/2025 91 70 - 99 mg/dL Final   05/09/2022 102 (H) 70 - 99 mg/dL Final   06/08/2021 82 70 - 99 mg/dL Final     Urea Nitrogen   Date Value Ref Range Status   04/14/2025 19.1 8.0 - 23.0 mg/dL Final   05/09/2022 10 7 - 30 mg/dL Final   06/08/2021 12 7 - 30 mg/dL Final     Creatinine   Date Value Ref Range Status   04/14/2025 0.92 0.51 - 0.95 mg/dL Final   06/08/2021 0.59 0.52 - 1.04  mg/dL Final     GFR Estimate   Date Value Ref Range Status   04/14/2025 71 >60 mL/min/1.73m2 Final     Comment:     eGFR calculated using 2021 CKD-EPI equation.   06/08/2021 >90 >60 mL/min/[1.73_m2] Final     Comment:     Non  GFR Calc  Starting 12/18/2018, serum creatinine based estimated GFR (eGFR) will be   calculated using the Chronic Kidney Disease Epidemiology Collaboration   (CKD-EPI) equation.       Calcium   Date Value Ref Range Status   04/14/2025 9.6 8.8 - 10.4 mg/dL Final   06/08/2021 9.1 8.5 - 10.1 mg/dL Final     Phosphorus   Date Value Ref Range Status   04/14/2025 4.4 2.5 - 4.5 mg/dL Final   10/05/2020 3.7 2.5 - 4.5 mg/dL Final     Albumin   Date Value Ref Range Status   04/14/2025 4.8 3.5 - 5.2 g/dL Final   05/09/2022 4.1 3.4 - 5.0 g/dL Final   06/08/2021 4.1 3.4 - 5.0 g/dL Final       Current Medication List:   Current Outpatient Medications (Antihypertensive, Cardiovascular, Diuretics, Beta blockers, Calcium blockers, Anticoagulants)   Medication Sig    spironolactone (ALDACTONE) 50 MG tablet Take 1 tablet (50 mg) by mouth daily.    losartan (COZAAR) 50 MG tablet Take 1 tablet (50 mg) by mouth 2 times daily.     Current Outpatient Medications (Other)   Medication Sig    Cholecalciferol (VITAMIN D3 PO) Take 1,000 Units by mouth daily    citalopram (CELEXA) 20 MG tablet Take 1 tablet (20 mg) by mouth daily.    EPINEPHrine (ANY BX GENERIC EQUIV) 0.3 MG/0.3ML injection 2-pack Inject 0.3 mLs (0.3 mg) into the muscle as needed for anaphylaxis. May repeat one time in 5-15 minutes if response to initial dose is inadequate.    magnesium 250 MG tablet Take 1 tablet by mouth daily       Has patient had kidney transplant in the prior 1 year: no.   Has patient been seen the last 12 months: Yes.  Associated labs reviewed for medication: Yes    PLAN:     Medication refilled per protocol: Yes    MICHAEL JAMES RN

## 2025-04-29 ENCOUNTER — PATIENT OUTREACH (OUTPATIENT)
Dept: CARE COORDINATION | Facility: CLINIC | Age: 60
End: 2025-04-29
Payer: COMMERCIAL

## 2025-05-05 ENCOUNTER — LAB (OUTPATIENT)
Dept: LAB | Facility: CLINIC | Age: 60
End: 2025-05-05
Payer: COMMERCIAL

## 2025-05-05 DIAGNOSIS — Z00.00 ROUTINE GENERAL MEDICAL EXAMINATION AT A HEALTH CARE FACILITY: ICD-10-CM

## 2025-05-05 LAB
ALBUMIN SERPL BCG-MCNC: 4.5 G/DL (ref 3.5–5.2)
ALP SERPL-CCNC: 94 U/L (ref 40–150)
ALT SERPL W P-5'-P-CCNC: 14 U/L (ref 0–50)
ANION GAP SERPL CALCULATED.3IONS-SCNC: 10 MMOL/L (ref 7–15)
AST SERPL W P-5'-P-CCNC: 29 U/L (ref 0–45)
BILIRUB SERPL-MCNC: 0.4 MG/DL
BUN SERPL-MCNC: 17.7 MG/DL (ref 8–23)
CALCIUM SERPL-MCNC: 9.3 MG/DL (ref 8.8–10.4)
CHLORIDE SERPL-SCNC: 98 MMOL/L (ref 98–107)
CHOLEST SERPL-MCNC: 199 MG/DL
CREAT SERPL-MCNC: 0.91 MG/DL (ref 0.51–0.95)
EGFRCR SERPLBLD CKD-EPI 2021: 72 ML/MIN/1.73M2
ERYTHROCYTE [DISTWIDTH] IN BLOOD BY AUTOMATED COUNT: 11.9 % (ref 10–15)
FASTING STATUS PATIENT QL REPORTED: YES
FASTING STATUS PATIENT QL REPORTED: YES
GLUCOSE SERPL-MCNC: 91 MG/DL (ref 70–99)
HCO3 SERPL-SCNC: 25 MMOL/L (ref 22–29)
HCT VFR BLD AUTO: 36.7 % (ref 35–47)
HDLC SERPL-MCNC: 52 MG/DL
HGB BLD-MCNC: 12.4 G/DL (ref 11.7–15.7)
LDLC SERPL CALC-MCNC: 133 MG/DL
MCH RBC QN AUTO: 31.2 PG (ref 26.5–33)
MCHC RBC AUTO-ENTMCNC: 33.8 G/DL (ref 31.5–36.5)
MCV RBC AUTO: 92 FL (ref 78–100)
NONHDLC SERPL-MCNC: 147 MG/DL
PLATELET # BLD AUTO: 322 10E3/UL (ref 150–450)
POTASSIUM SERPL-SCNC: 4.7 MMOL/L (ref 3.4–5.3)
PROT SERPL-MCNC: 7.6 G/DL (ref 6.4–8.3)
RBC # BLD AUTO: 3.98 10E6/UL (ref 3.8–5.2)
SODIUM SERPL-SCNC: 133 MMOL/L (ref 135–145)
TRIGL SERPL-MCNC: 69 MG/DL
TSH SERPL DL<=0.005 MIU/L-ACNC: 1.17 UIU/ML (ref 0.3–4.2)
WBC # BLD AUTO: 5.9 10E3/UL (ref 4–11)

## 2025-05-05 PROCEDURE — 84443 ASSAY THYROID STIM HORMONE: CPT

## 2025-05-05 PROCEDURE — 85027 COMPLETE CBC AUTOMATED: CPT

## 2025-05-05 PROCEDURE — 36415 COLL VENOUS BLD VENIPUNCTURE: CPT

## 2025-05-05 PROCEDURE — 80053 COMPREHEN METABOLIC PANEL: CPT

## 2025-05-05 PROCEDURE — 80061 LIPID PANEL: CPT

## 2025-05-06 ENCOUNTER — TELEPHONE (OUTPATIENT)
Dept: GASTROENTEROLOGY | Facility: CLINIC | Age: 60
End: 2025-05-06
Payer: COMMERCIAL

## 2025-05-06 DIAGNOSIS — Z12.11 SPECIAL SCREENING FOR MALIGNANT NEOPLASMS, COLON: Primary | ICD-10-CM

## 2025-05-06 RX ORDER — BISACODYL 5 MG
TABLET, DELAYED RELEASE (ENTERIC COATED) ORAL
Qty: 4 TABLET | Refills: 0 | Status: SHIPPED | OUTPATIENT
Start: 2025-05-06

## 2025-05-06 NOTE — TELEPHONE ENCOUNTER
"Endoscopy Scheduling Screen    Caller: patient    Have you had any respiratory illness or flu-like symptoms in the last 10 days?  No    What is your communication preference for Instructions and/or Bowel Prep?   Heidit    What insurance is in the chart?  Other:  [facility] [employer]    Ordering/Referring Provider: ISSA WIGGINS    (If ordering provider performs procedure, schedule with ordering provider unless otherwise instructed. )    BMI: Estimated body mass index is 27.08 kg/m  as calculated from the following:    Height as of 4/28/25: 1.753 m (5' 9\").    Weight as of 4/28/25: 83.2 kg (183 lb 6.4 oz).     Sedation Ordered  moderate sedation.   If patient BMI > 50 do not schedule in [facility].    If patient BMI > 45 do not schedule at [facility].    Are you taking methadone or Suboxone?  NO, No RN review required.    Have you been diagnosed and are being treated for severe PTSD or severe anxiety?  NO, No RN review required.    Are you taking any prescription medications for pain 3 or more times per week?   NO, No RN review required.    Do you have a history of malignant hyperthermia?  No    (Females) Are you currently pregnant?   No     Have you been diagnosed or told you have pulmonary hypertension?   No    Do you have an LVAD?  No    Have you been told you have moderate to severe sleep apnea?  No.    Have you been told you have COPD, asthma, or any other lung disease?  No    Has your doctor ordered any cardiac tests like echo, angiogram, stress test, ablation, or EKG, that you have not completed yet?  No    Do you  have a history of any heart conditions?  No     Have you ever had or are you waiting for an organ transplant?  No. Continue scheduling, no site restrictions.    Have you had a stroke or transient ischemic attack (TIA aka \"mini stroke\") in the last 2 years?   No.    Have you been diagnosed with or been told you have cirrhosis of the liver?   No.    Are you currently on dialysis?   No    Do you need assistance " "transferring?   No    BMI: Estimated body mass index is 27.08 kg/m  as calculated from the following:    Height as of 4/28/25: 1.753 m (5' 9\").    Weight as of 4/28/25: 83.2 kg (183 lb 6.4 oz).     Is patients BMI > 40 and scheduling location [facility]?  No    Do you take an injectable or oral medication for weight loss or diabetes (excluding insulin)?  No    Do you take the medication Naltrexone?  No    Do you take blood thinners?  No       Prep   Are you currently on dialysis or do you have chronic kidney disease?  No    Do you have a diagnosis of diabetes?  No    Do you have a diagnosis of cystic fibrosis (CF)?  No    On a regular basis do you go 3 -5 days between bowel movements?  No    BMI > 40?  No    Preferred Pharmacy:    [facility] [place] - [place] - [address]   [address]   [address]  [place] [id number]  Phone: [phone] Fax: [phone]      Final Scheduling Details     Procedure scheduled  Colonoscopy    Surgeon:  KASHIF per order     Date of procedure:  5/27/25     Pre-OP / PAC:   No - Not required for this site.    Location  [facility] - Patient preference.    Sedation   Moderate Sedation - Per order.      Patient Reminders:   You will receive a call from a Nurse to review instructions and health history.  This assessment must be completed prior to your procedure.  Failure to complete the Nurse assessment may result in the procedure being cancelled.      On the day of your procedure, please designate an adult(s) who can drive you home stay with you for the next 24 hours. The medicines used in the exam will make you sleepy. You will not be able to drive.      You cannot take public transportation, ride share services, or non-medical taxi service without a responsible caregiver.  Medical transport services are allowed with the requirement that a responsible caregiver will receive you at your destination.  We require that drivers and caregivers are confirmed prior to your " procedure.

## 2025-05-06 NOTE — TELEPHONE ENCOUNTER
Pre visit planning completed.      Procedure details:    Patient scheduled for Colonoscopy on 05.27.2025.     Arrival time: 0645. Procedure time 0730    Facility location: [facility]; [address] [place] [address]. Check in location: Main entrance, door #1 on the North side of the building under roundabout awning. DO NOT GO TO SURGERY/ED ENTRANCE.     Sedation type: Conscious sedation     Pre op exam needed? No.    Indication for procedure:   Routine general medical examination at a health care facility   Special screening for malignant neoplasms, colon         Chart review:     Electronic implanted devices? No    Recent diagnosis of diverticulitis within the last 6 weeks? No      Medication review:    Diabetic? No    Anticoagulants? No    Weight loss medication/injectable? No GLP-1 medication per patient's medication list. Nursing to verify with pre-assessment call.    Other medication HOLDING recommendations:  N/A      Prep for procedure:     Bowel prep recommendation: Standard Golytely. Bowel prep sent to [facility] [place] - [address]   Due to: CKD noted    Procedure information and instructions sent via [employer]         Janice Townsend RN  Endoscopy Procedure Pre Assessment   [phone] option 3

## 2025-05-07 NOTE — TELEPHONE ENCOUNTER
Attempted to contact patient in order to complete pre assessment questions.     No answer. Left message to return call to [phone] option 3.    Callback communication sent via [facility].    Lesli Gaytan LPN

## 2025-05-08 NOTE — TELEPHONE ENCOUNTER
Pre assessment completed for upcoming procedure.   (Please see previous telephone encounter notes for complete details)    Patient returned call.       Procedure details:    Arrival time and facility location reviewed.    Pre op exam needed? No.    Designated  policy reviewed. Instructed to have someone stay 6  hours post procedure.       Medication review:    Medications reviewed. Please see supporting documentation below. Holding recommendations discussed (if applicable).       Prep for procedure:     Procedure prep instructions reviewed.        Any additional information needed:  N/A      Patient verbalized understanding and had no questions or concerns at this time.      Alissa Moctezuma RN  Endoscopy Procedure Pre Assessment   [phone] option 3

## 2025-05-16 ENCOUNTER — HOSPITAL ENCOUNTER (OUTPATIENT)
Dept: MAMMOGRAPHY | Facility: CLINIC | Age: 60
Discharge: HOME | End: 2025-05-16
Attending: PEDIATRICS | Admitting: PEDIATRICS
Payer: COMMERCIAL

## 2025-05-16 DIAGNOSIS — Z12.31 VISIT FOR SCREENING MAMMOGRAM: ICD-10-CM

## 2025-05-16 PROCEDURE — 77063 BREAST TOMOSYNTHESIS BI: CPT

## 2025-05-27 ENCOUNTER — HOSPITAL ENCOUNTER (OUTPATIENT)
Facility: CLINIC | Age: 60
Discharge: HOME | End: 2025-05-27
Attending: COLON & RECTAL SURGERY | Admitting: COLON & RECTAL SURGERY
Payer: COMMERCIAL

## 2025-05-27 VITALS
SYSTOLIC BLOOD PRESSURE: 120 MMHG | BODY MASS INDEX: 27.4 KG/M2 | TEMPERATURE: 98.1 F | RESPIRATION RATE: 16 BRPM | OXYGEN SATURATION: 98 % | DIASTOLIC BLOOD PRESSURE: 80 MMHG | WEIGHT: 185 LBS | HEART RATE: 60 BPM | HEIGHT: 69 IN

## 2025-05-27 LAB — COLONOSCOPY: NORMAL

## 2025-05-27 PROCEDURE — 250N000011 HC RX IP 250 OP 636: Mod: JZ | Performed by: COLON & RECTAL SURGERY

## 2025-05-27 PROCEDURE — G0121 COLON CA SCRN NOT HI RSK IND: HCPCS | Performed by: COLON & RECTAL SURGERY

## 2025-05-27 PROCEDURE — 99153 MOD SED SAME PHYS/QHP EA: CPT | Performed by: COLON & RECTAL SURGERY

## 2025-05-27 PROCEDURE — G0500 MOD SEDAT ENDO SERVICE >5YRS: HCPCS | Performed by: COLON & RECTAL SURGERY

## 2025-05-27 RX ORDER — SODIUM CHLORIDE, SODIUM LACTATE, POTASSIUM CHLORIDE, CALCIUM CHLORIDE 600; 310; 30; 20 MG/100ML; MG/100ML; MG/100ML; MG/100ML
INJECTION, SOLUTION INTRAVENOUS CONTINUOUS
Status: DISCONTINUED | OUTPATIENT
Start: 2025-05-27 | End: 2025-05-27 | Stop reason: HOSPADM

## 2025-05-27 RX ORDER — PROCHLORPERAZINE MALEATE 10 MG
10 TABLET ORAL EVERY 6 HOURS PRN
Status: DISCONTINUED | OUTPATIENT
Start: 2025-05-27 | End: 2025-05-27 | Stop reason: HOSPADM

## 2025-05-27 RX ORDER — FENTANYL CITRATE 50 UG/ML
25-100 INJECTION, SOLUTION INTRAMUSCULAR; INTRAVENOUS EVERY 5 MIN PRN
Refills: 0 | Status: DISCONTINUED | OUTPATIENT
Start: 2025-05-27 | End: 2025-05-27 | Stop reason: HOSPADM

## 2025-05-27 RX ORDER — ATROPINE SULFATE 0.1 MG/ML
1 INJECTION INTRAVENOUS
Status: DISCONTINUED | OUTPATIENT
Start: 2025-05-27 | End: 2025-05-27 | Stop reason: HOSPADM

## 2025-05-27 RX ORDER — SIMETHICONE 40MG/0.6ML
133 SUSPENSION, DROPS(FINAL DOSAGE FORM)(ML) ORAL
Status: DISCONTINUED | OUTPATIENT
Start: 2025-05-27 | End: 2025-05-27 | Stop reason: HOSPADM

## 2025-05-27 RX ORDER — NALOXONE HYDROCHLORIDE 0.4 MG/ML
0.4 INJECTION, SOLUTION INTRAMUSCULAR; INTRAVENOUS; SUBCUTANEOUS
Status: DISCONTINUED | OUTPATIENT
Start: 2025-05-27 | End: 2025-05-27 | Stop reason: HOSPADM

## 2025-05-27 RX ORDER — NALOXONE HYDROCHLORIDE 0.4 MG/ML
0.2 INJECTION, SOLUTION INTRAMUSCULAR; INTRAVENOUS; SUBCUTANEOUS
Status: DISCONTINUED | OUTPATIENT
Start: 2025-05-27 | End: 2025-05-27 | Stop reason: HOSPADM

## 2025-05-27 RX ORDER — DIPHENHYDRAMINE HYDROCHLORIDE 50 MG/ML
25-50 INJECTION, SOLUTION INTRAMUSCULAR; INTRAVENOUS
Status: DISCONTINUED | OUTPATIENT
Start: 2025-05-27 | End: 2025-05-27 | Stop reason: HOSPADM

## 2025-05-27 RX ORDER — ONDANSETRON 2 MG/ML
4 INJECTION INTRAMUSCULAR; INTRAVENOUS EVERY 6 HOURS PRN
Status: DISCONTINUED | OUTPATIENT
Start: 2025-05-27 | End: 2025-05-27 | Stop reason: HOSPADM

## 2025-05-27 RX ORDER — FLUMAZENIL 0.1 MG/ML
0.2 INJECTION, SOLUTION INTRAVENOUS
Status: DISCONTINUED | OUTPATIENT
Start: 2025-05-27 | End: 2025-05-27 | Stop reason: HOSPADM

## 2025-05-27 RX ORDER — EPINEPHRINE 1 MG/ML
0.1 INJECTION, SOLUTION, CONCENTRATE INTRAVENOUS
Status: DISCONTINUED | OUTPATIENT
Start: 2025-05-27 | End: 2025-05-27 | Stop reason: HOSPADM

## 2025-05-27 RX ORDER — ONDANSETRON 2 MG/ML
4 INJECTION INTRAMUSCULAR; INTRAVENOUS
Status: DISCONTINUED | OUTPATIENT
Start: 2025-05-27 | End: 2025-05-27 | Stop reason: HOSPADM

## 2025-05-27 RX ORDER — ONDANSETRON 4 MG/1
4 TABLET, ORALLY DISINTEGRATING ORAL EVERY 6 HOURS PRN
Status: DISCONTINUED | OUTPATIENT
Start: 2025-05-27 | End: 2025-05-27 | Stop reason: HOSPADM

## 2025-05-27 RX ORDER — LIDOCAINE 40 MG/G
CREAM TOPICAL
Status: DISCONTINUED | OUTPATIENT
Start: 2025-05-27 | End: 2025-05-27 | Stop reason: HOSPADM

## 2025-05-27 RX ADMIN — MIDAZOLAM 1 MG: 1 INJECTION INTRAMUSCULAR; INTRAVENOUS at 07:47

## 2025-05-27 RX ADMIN — MIDAZOLAM 2 MG: 1 INJECTION INTRAMUSCULAR; INTRAVENOUS at 07:42

## 2025-05-27 RX ADMIN — FENTANYL CITRATE 100 MCG: 50 INJECTION, SOLUTION INTRAMUSCULAR; INTRAVENOUS at 07:42

## 2025-05-27 RX ADMIN — MIDAZOLAM 1 MG: 1 INJECTION INTRAMUSCULAR; INTRAVENOUS at 07:51

## 2025-05-27 ASSESSMENT — ACTIVITIES OF DAILY LIVING (ADL)
ADLS_ACUITY_SCORE: 41
ADLS_ACUITY_SCORE: 41

## (undated) RX ORDER — FENTANYL CITRATE 50 UG/ML
INJECTION, SOLUTION INTRAMUSCULAR; INTRAVENOUS
Status: DISPENSED
Start: 2025-05-27